# Patient Record
Sex: FEMALE | Race: OTHER | HISPANIC OR LATINO | URBAN - METROPOLITAN AREA
[De-identification: names, ages, dates, MRNs, and addresses within clinical notes are randomized per-mention and may not be internally consistent; named-entity substitution may affect disease eponyms.]

---

## 2018-12-18 ENCOUNTER — EMERGENCY (EMERGENCY)
Facility: HOSPITAL | Age: 53
LOS: 1 days | Discharge: HOME | End: 2018-12-18
Attending: EMERGENCY MEDICINE | Admitting: EMERGENCY MEDICINE
Payer: SELF-PAY

## 2018-12-18 VITALS
OXYGEN SATURATION: 98 % | DIASTOLIC BLOOD PRESSURE: 75 MMHG | RESPIRATION RATE: 16 BRPM | SYSTOLIC BLOOD PRESSURE: 133 MMHG | HEART RATE: 114 BPM | TEMPERATURE: 101 F

## 2018-12-18 VITALS
SYSTOLIC BLOOD PRESSURE: 111 MMHG | TEMPERATURE: 98 F | DIASTOLIC BLOOD PRESSURE: 77 MMHG | OXYGEN SATURATION: 100 % | HEART RATE: 90 BPM | RESPIRATION RATE: 18 BRPM

## 2018-12-18 DIAGNOSIS — Z79.2 LONG TERM (CURRENT) USE OF ANTIBIOTICS: ICD-10-CM

## 2018-12-18 DIAGNOSIS — Z98.890 OTHER SPECIFIED POSTPROCEDURAL STATES: Chronic | ICD-10-CM

## 2018-12-18 DIAGNOSIS — Z79.891 LONG TERM (CURRENT) USE OF OPIATE ANALGESIC: ICD-10-CM

## 2018-12-18 DIAGNOSIS — Z79.1 LONG TERM (CURRENT) USE OF NON-STEROIDAL ANTI-INFLAMMATORIES (NSAID): ICD-10-CM

## 2018-12-18 DIAGNOSIS — Z79.899 OTHER LONG TERM (CURRENT) DRUG THERAPY: ICD-10-CM

## 2018-12-18 DIAGNOSIS — R10.31 RIGHT LOWER QUADRANT PAIN: ICD-10-CM

## 2018-12-18 DIAGNOSIS — K57.32 DIVERTICULITIS OF LARGE INTESTINE WITHOUT PERFORATION OR ABSCESS WITHOUT BLEEDING: ICD-10-CM

## 2018-12-18 LAB
ALBUMIN SERPL ELPH-MCNC: 3.9 G/DL — SIGNIFICANT CHANGE UP (ref 3.4–5)
ALP SERPL-CCNC: 83 U/L — SIGNIFICANT CHANGE UP (ref 40–120)
ALT FLD-CCNC: 23 U/L — SIGNIFICANT CHANGE UP (ref 12–42)
ANION GAP SERPL CALC-SCNC: 7 MMOL/L — LOW (ref 9–16)
APPEARANCE UR: CLEAR — SIGNIFICANT CHANGE UP
APTT BLD: 27.2 SEC — LOW (ref 27.5–36.3)
AST SERPL-CCNC: 15 U/L — SIGNIFICANT CHANGE UP (ref 15–37)
BASOPHILS NFR BLD AUTO: 0.5 % — SIGNIFICANT CHANGE UP (ref 0–2)
BILIRUB SERPL-MCNC: 0.7 MG/DL — SIGNIFICANT CHANGE UP (ref 0.2–1.2)
BILIRUB UR-MCNC: NEGATIVE — SIGNIFICANT CHANGE UP
BUN SERPL-MCNC: 15 MG/DL — SIGNIFICANT CHANGE UP (ref 7–23)
CALCIUM SERPL-MCNC: 9.5 MG/DL — SIGNIFICANT CHANGE UP (ref 8.5–10.5)
CHLORIDE SERPL-SCNC: 98 MMOL/L — SIGNIFICANT CHANGE UP (ref 96–108)
CO2 SERPL-SCNC: 31 MMOL/L — SIGNIFICANT CHANGE UP (ref 22–31)
COLOR SPEC: YELLOW — SIGNIFICANT CHANGE UP
CREAT SERPL-MCNC: 0.84 MG/DL — SIGNIFICANT CHANGE UP (ref 0.5–1.3)
DIFF PNL FLD: ABNORMAL
EOSINOPHIL NFR BLD AUTO: 0 % — SIGNIFICANT CHANGE UP (ref 0–6)
GLUCOSE SERPL-MCNC: 236 MG/DL — HIGH (ref 70–99)
GLUCOSE UR QL: 500
HCG UR QL: NEGATIVE — SIGNIFICANT CHANGE UP
HCT VFR BLD CALC: 39.8 % — SIGNIFICANT CHANGE UP (ref 34.5–45)
HGB BLD-MCNC: 13.5 G/DL — SIGNIFICANT CHANGE UP (ref 11.5–15.5)
IMM GRANULOCYTES NFR BLD AUTO: 0.3 % — SIGNIFICANT CHANGE UP (ref 0–1.5)
INR BLD: 1.1 — SIGNIFICANT CHANGE UP (ref 0.88–1.16)
KETONES UR-MCNC: 15 MG/DL
LACTATE SERPL-SCNC: 1.8 MMOL/L — SIGNIFICANT CHANGE UP (ref 0.4–2)
LEUKOCYTE ESTERASE UR-ACNC: NEGATIVE — SIGNIFICANT CHANGE UP
LIDOCAIN IGE QN: 51 U/L — LOW (ref 73–393)
LYMPHOCYTES # BLD AUTO: 17.1 % — SIGNIFICANT CHANGE UP (ref 13–44)
MCHC RBC-ENTMCNC: 30.2 PG — SIGNIFICANT CHANGE UP (ref 27–34)
MCHC RBC-ENTMCNC: 33.9 G/DL — SIGNIFICANT CHANGE UP (ref 32–36)
MCV RBC AUTO: 89 FL — SIGNIFICANT CHANGE UP (ref 80–100)
MONOCYTES NFR BLD AUTO: 3.3 % — SIGNIFICANT CHANGE UP (ref 2–14)
NEUTROPHILS NFR BLD AUTO: 78.8 % — HIGH (ref 43–77)
NITRITE UR-MCNC: NEGATIVE — SIGNIFICANT CHANGE UP
PCP SPEC-MCNC: SIGNIFICANT CHANGE UP
PH UR: 6 — SIGNIFICANT CHANGE UP (ref 5–8)
PLATELET # BLD AUTO: 442 K/UL — HIGH (ref 150–400)
POTASSIUM SERPL-MCNC: 3.3 MMOL/L — LOW (ref 3.5–5.3)
POTASSIUM SERPL-SCNC: 3.3 MMOL/L — LOW (ref 3.5–5.3)
PROT SERPL-MCNC: 7.6 G/DL — SIGNIFICANT CHANGE UP (ref 6.4–8.2)
PROT UR-MCNC: NEGATIVE MG/DL — SIGNIFICANT CHANGE UP
PROTHROM AB SERPL-ACNC: 12.3 SEC — SIGNIFICANT CHANGE UP (ref 10–12.9)
RBC # BLD: 4.47 M/UL — SIGNIFICANT CHANGE UP (ref 3.8–5.2)
RBC # FLD: 11.8 % — SIGNIFICANT CHANGE UP (ref 10.3–14.5)
SODIUM SERPL-SCNC: 136 MMOL/L — SIGNIFICANT CHANGE UP (ref 132–145)
SP GR SPEC: 1.02 — SIGNIFICANT CHANGE UP (ref 1–1.03)
TROPONIN I SERPL-MCNC: <0.017 NG/ML — LOW (ref 0.02–0.06)
UROBILINOGEN FLD QL: 0.2 E.U./DL — SIGNIFICANT CHANGE UP
WBC # BLD: 4 K/UL — SIGNIFICANT CHANGE UP (ref 3.8–10.5)
WBC # FLD AUTO: 4 K/UL — SIGNIFICANT CHANGE UP (ref 3.8–10.5)

## 2018-12-18 PROCEDURE — 99218: CPT

## 2018-12-18 PROCEDURE — 74177 CT ABD & PELVIS W/CONTRAST: CPT | Mod: 26

## 2018-12-18 PROCEDURE — 93010 ELECTROCARDIOGRAM REPORT: CPT | Mod: 76

## 2018-12-18 RX ORDER — MORPHINE SULFATE 50 MG/1
4 CAPSULE, EXTENDED RELEASE ORAL ONCE
Qty: 0 | Refills: 0 | Status: DISCONTINUED | OUTPATIENT
Start: 2018-12-18 | End: 2018-12-18

## 2018-12-18 RX ORDER — FAMOTIDINE 10 MG/ML
20 INJECTION INTRAVENOUS ONCE
Qty: 0 | Refills: 0 | Status: COMPLETED | OUTPATIENT
Start: 2018-12-18 | End: 2018-12-18

## 2018-12-18 RX ORDER — POTASSIUM CHLORIDE 20 MEQ
40 PACKET (EA) ORAL ONCE
Qty: 0 | Refills: 0 | Status: COMPLETED | OUTPATIENT
Start: 2018-12-18 | End: 2018-12-18

## 2018-12-18 RX ORDER — METRONIDAZOLE 500 MG
500 TABLET ORAL ONCE
Qty: 0 | Refills: 0 | Status: COMPLETED | OUTPATIENT
Start: 2018-12-18 | End: 2018-12-18

## 2018-12-18 RX ORDER — SODIUM CHLORIDE 9 MG/ML
1000 INJECTION INTRAMUSCULAR; INTRAVENOUS; SUBCUTANEOUS ONCE
Qty: 0 | Refills: 0 | Status: COMPLETED | OUTPATIENT
Start: 2018-12-18 | End: 2018-12-18

## 2018-12-18 RX ORDER — METRONIDAZOLE 500 MG
1 TABLET ORAL
Qty: 21 | Refills: 0 | OUTPATIENT
Start: 2018-12-18 | End: 2018-12-24

## 2018-12-18 RX ORDER — ACETAMINOPHEN 500 MG
975 TABLET ORAL ONCE
Qty: 0 | Refills: 0 | Status: COMPLETED | OUTPATIENT
Start: 2018-12-18 | End: 2018-12-18

## 2018-12-18 RX ORDER — IBUPROFEN 200 MG
1 TABLET ORAL
Qty: 20 | Refills: 0 | OUTPATIENT
Start: 2018-12-18

## 2018-12-18 RX ORDER — ONDANSETRON 8 MG/1
4 TABLET, FILM COATED ORAL ONCE
Qty: 0 | Refills: 0 | Status: COMPLETED | OUTPATIENT
Start: 2018-12-18 | End: 2018-12-18

## 2018-12-18 RX ORDER — PIPERACILLIN AND TAZOBACTAM 4; .5 G/20ML; G/20ML
3.38 INJECTION, POWDER, LYOPHILIZED, FOR SOLUTION INTRAVENOUS ONCE
Qty: 0 | Refills: 0 | Status: COMPLETED | OUTPATIENT
Start: 2018-12-18 | End: 2018-12-18

## 2018-12-18 RX ORDER — KETOROLAC TROMETHAMINE 30 MG/ML
30 SYRINGE (ML) INJECTION ONCE
Qty: 0 | Refills: 0 | Status: DISCONTINUED | OUTPATIENT
Start: 2018-12-18 | End: 2018-12-18

## 2018-12-18 RX ORDER — ONDANSETRON 8 MG/1
1 TABLET, FILM COATED ORAL
Qty: 12 | Refills: 0 | OUTPATIENT
Start: 2018-12-18

## 2018-12-18 RX ORDER — CEFPODOXIME PROXETIL 100 MG
1 TABLET ORAL
Qty: 14 | Refills: 0 | OUTPATIENT
Start: 2018-12-18 | End: 2018-12-24

## 2018-12-18 RX ORDER — SODIUM CHLORIDE 9 MG/ML
1000 INJECTION, SOLUTION INTRAVENOUS
Qty: 0 | Refills: 0 | Status: DISCONTINUED | OUTPATIENT
Start: 2018-12-18 | End: 2018-12-22

## 2018-12-18 RX ADMIN — Medication 30 MILLIGRAM(S): at 08:53

## 2018-12-18 RX ADMIN — SODIUM CHLORIDE 2000 MILLILITER(S): 9 INJECTION INTRAMUSCULAR; INTRAVENOUS; SUBCUTANEOUS at 13:38

## 2018-12-18 RX ADMIN — FAMOTIDINE 20 MILLIGRAM(S): 10 INJECTION INTRAVENOUS at 08:53

## 2018-12-18 RX ADMIN — Medication 975 MILLIGRAM(S): at 13:00

## 2018-12-18 RX ADMIN — ONDANSETRON 4 MILLIGRAM(S): 8 TABLET, FILM COATED ORAL at 07:58

## 2018-12-18 RX ADMIN — PIPERACILLIN AND TAZOBACTAM 200 GRAM(S): 4; .5 INJECTION, POWDER, LYOPHILIZED, FOR SOLUTION INTRAVENOUS at 14:24

## 2018-12-18 RX ADMIN — Medication 100 MILLIGRAM(S): at 10:01

## 2018-12-18 RX ADMIN — Medication 1 MILLIGRAM(S): at 14:24

## 2018-12-18 RX ADMIN — SODIUM CHLORIDE 2000 MILLILITER(S): 9 INJECTION INTRAMUSCULAR; INTRAVENOUS; SUBCUTANEOUS at 14:12

## 2018-12-18 RX ADMIN — SODIUM CHLORIDE 2000 MILLILITER(S): 9 INJECTION INTRAMUSCULAR; INTRAVENOUS; SUBCUTANEOUS at 10:01

## 2018-12-18 RX ADMIN — Medication 30 MILLIGRAM(S): at 14:13

## 2018-12-18 RX ADMIN — SODIUM CHLORIDE 2000 MILLILITER(S): 9 INJECTION INTRAMUSCULAR; INTRAVENOUS; SUBCUTANEOUS at 08:53

## 2018-12-18 RX ADMIN — MORPHINE SULFATE 4 MILLIGRAM(S): 50 CAPSULE, EXTENDED RELEASE ORAL at 10:01

## 2018-12-18 RX ADMIN — Medication 40 MILLIEQUIVALENT(S): at 10:32

## 2018-12-18 RX ADMIN — SODIUM CHLORIDE 1000 MILLILITER(S): 9 INJECTION, SOLUTION INTRAVENOUS at 14:48

## 2018-12-18 NOTE — ED CDU PROVIDER DISPOSITION NOTE - CLINICAL COURSE
pt with lower abd pain, N/V, dry and dehydrated appearing, non toxic, labs with no leukocytosis but slight L shift, abd soft but diffusely tender in lower quadrants, CT with severe sigmoid diverticulitis without abscess or perforation, kept in obs for IVF and abx. Pt spiked temp of 104 subsequently, also noted amphetamine in urine, pt with lower abd pain, N/V, dry and dehydrated appearing, non toxic, labs with no leukocytosis but slight L shift, abd soft but diffusely tender in lower quadrants, CT with severe sigmoid diverticulitis without abscess or perforation, kept in obs for IVF and abx. covered with dose of levaquin and flagyl initially, Pt spiked temp of 104 subsequently, also noted amphetamine in urine, offered admission multiple times due to concerns for sepsis and high fever, pt and family at bedside, declined multiple times and desires to go home.  Agrees with continue IVF and antipyretic, added on zosyn for additional coverage, ice packs to the axilla and close monitoring. fever defervesced to 100.1 and pt tolerating po without N/V, abd soft, pt non toxic appearing and strongly desires to go home. Will dc home levaquin/flagyl/cefpodoxime, strict return precautions discussed, low fiber diet, prompt f/u with GI and PMD, return to ER for any worsening sx, pt and mother verbalized understanding

## 2018-12-18 NOTE — ED CDU PROVIDER DISPOSITION NOTE - CARE PROVIDERS DIRECT ADDRESSES
,DirectAddress_Unknown,[email or web address]

## 2018-12-18 NOTE — ED CDU PROVIDER INITIAL DAY NOTE - OBJECTIVE STATEMENT
54 yo F with no known PMHx, presenting c/o lower abdominal pain x 4d.  Pt reports having sudden onset of lower abdominal pain with associated slow urination and pain in the pelvic region, radiating to b/l lower extremities and lower back. Admits to chills, nausea and generalized weakness.  Denies fever,  hematuria, vaginal bleeding, d/c, change in bowel function, flank pain, rash, HA, dizziness, SOB, CP, palpitations, diaphoresis, cough, and malaise. No recent travel or sick contact noted

## 2018-12-18 NOTE — ED PROVIDER NOTE - OBJECTIVE STATEMENT
52 yo F with no known PMHx, 52 yo F with no known PMHx, presenting c/o lower abdominal pain x 4d.  Pt reports having sudden onset of lowerl abdominal pain with associated slow urination and pain in the pelvic region, radiating to b/l lower extremities and lower back. Admits to chills, nausea and generalized weakness.  Denies fever,  hematuria, vaginal bleeding, d/c, change in bowel function, flank pain, rash, HA, dizziness, SOB, CP, palpitations, diaphoresis, cough, and malaise. No recent travel or sick contact noted 52 yo F with no known PMHx, presenting c/o lower abdominal pain x 4d.  Pt reports having sudden onset of lower abdominal pain with associated slow urination and pain in the pelvic region, radiating to b/l lower extremities and lower back. Admits to chills, nausea and generalized weakness.  Denies fever,  hematuria, vaginal bleeding, d/c, change in bowel function, flank pain, rash, HA, dizziness, SOB, CP, palpitations, diaphoresis, cough, and malaise. No recent travel or sick contact noted

## 2018-12-18 NOTE — ED CDU PROVIDER DISPOSITION NOTE - CARE PROVIDER_API CALL
Susan Renner), Gastroenterology  [address] [facility]  [address]  [place] [address]  Phone: [phone]  Fax: [phone]    Fannie Hamlin), Internal Medicine  [address] [place] [address]  Phone: [phone]  Fax: [phone]

## 2018-12-18 NOTE — ED ADULT TRIAGE NOTE - CHIEF COMPLAINT QUOTE
ambulatory, complaining of lower abdominal pain with nausea radiating to the back since yesterday. Denies urinary sx.

## 2018-12-18 NOTE — ED CDU PROVIDER INITIAL DAY NOTE - MEDICAL DECISION MAKING DETAILS
pt with lower abd pain, N/V, dry and dehydrated appearing, non toxic, labs with normal wbc but slightly L shift, abd soft but diffusely tender in lower quadrants, CT with severe sigmoid diverticulitis without abscess or perforation, will keep in obs for IV abx, pain control, po trial and close monitoring

## 2018-12-18 NOTE — ED CDU PROVIDER DISPOSITION NOTE - ATTENDING CONTRIBUTION TO CARE
Pt improved while on observation.  Strict return precautions discussed.  Strict instructions for close f/u with PCP/clinic.  Medications discussed.

## 2018-12-18 NOTE — ED PROVIDER NOTE - MEDICAL DECISION MAKING DETAILS
pt with lower abd pain, N/V, dry and dehydrated appearing, non toxic, labs with mildly elevated wbc, abd soft but diffusely tender in lower quadrants, CT with severe sigmoid diverticulitis without abscess or perforation, will keep in obs for IV abx, pain control, po trial and close monitoring

## 2018-12-18 NOTE — ED ADULT NURSE NOTE - NSIMPLEMENTINTERV_GEN_ALL_ED
Implemented All Universal Safety Interventions:  Lissie to call system. Call bell, personal items and telephone within reach. Instruct patient to call for assistance. Room bathroom lighting operational. Non-slip footwear when patient is off stretcher. Physically safe environment: no spills, clutter or unnecessary equipment. Stretcher in lowest position, wheels locked, appropriate side rails in place.

## 2018-12-18 NOTE — ED CDU PROVIDER INITIAL DAY NOTE - ATTENDING CONTRIBUTION TO CARE
Pt on observation for sigmoid diverticulitis.  IV abx, pain control, IV hydration, and serial abd exams.

## 2018-12-18 NOTE — ED ADULT NURSE NOTE - OBJECTIVE STATEMENT
umbilical pain radiating to groin x 4 days, denies additional symptoms, aunt at bedside, no s/s of distress, will continue to monitor

## 2018-12-18 NOTE — SBIRT NOTE. - NSSBIRTSERVICES_GEN_A_ED_FT
Provided SBIRT services : Full screen positive. Positive reinforcement provided given patient currently within healthy guidelines. Educational materials reviewed and given to patient .     Audit Score : 3    Dast Score :0    Duration : 5 Minutes

## 2018-12-18 NOTE — ED PROVIDER NOTE - PHYSICAL EXAMINATION
Vital Signs - nursing notes reviewed and confirmed  Gen - WDWN F, NAD, comfortable and non-toxic appearing, speaking in full sentences   Skin - warm, dry, intact  HEENT - AT/NC, PERRL, EOMI, no conjunctival injection, dry oral mucosa, TM intact b/l with good cone of lights, o/p clear with no erythema, edema, or exudate, uvula midline, airway patent, neck supple and NT, FROM, no JVD or carotid bruits b/l, no palpable nodes  CV - S1S2, R/R/R  Resp - respiration non-labored, CTAB, symmetric bs b/l, no r/r/w  GI - NABS, soft, ND, b/l lower quadrants TTP with guarding, no rebound, no CVAT b/l   MS - w/w/p, no c/c/e, calves supple and NT, distal pulses symmetric b/l, brisk cap refills, +SILT  Neuro - AxOx3, no focal neuro deficits, ambulatory without gait disturbance

## 2018-12-18 NOTE — ED PROVIDER NOTE - ATTENDING CONTRIBUTION TO CARE
Pt is a 54yo F with no PMH.  P/w lower abd pain.   PE - agree with Above.   A/P - CT demonstrates sigmoid diverticulitis.  Pt declines admission will place on obs for abx, IV hydration, and pain control.

## 2018-12-18 NOTE — ED CDU PROVIDER INITIAL DAY NOTE - PROGRESS NOTE DETAILS
pt with lower abd pain, N/V, dry and dehydrated appearing, non toxic, labs with no leukocytosis but slight L shift, abd soft but diffusely tender in lower quadrants, CT with severe sigmoid diverticulitis without abscess or perforation, will keep in obs for IV abx, pain control, IV hydration and po trial repeat EKG with no dynamic changes, persistent slight STD in interolateral leads, pt CP free, abd pain adequately controlled with dose of IV morphine, will continue with IVF, abx, and current management spiked temp of 102, BP stable, pain adequately controlled, pt non toxic appearing, will give APAP and IVF, continue to monitor offered admission multiple times due to concerns for sepsis and high fever, pt and family at bedside, declined multiple times and desires to go home.  Agrees with continue IVF and antipyretic, will add on zosyn for additional coverage, ice packs to the axilla and close monitoring

## 2019-01-28 ENCOUNTER — INPATIENT (INPATIENT)
Facility: HOSPITAL | Age: 54
LOS: 7 days | Discharge: HOME | DRG: 391 | End: 2019-02-05
Attending: GENERAL ACUTE CARE HOSPITAL | Admitting: GENERAL ACUTE CARE HOSPITAL
Payer: COMMERCIAL

## 2019-01-28 VITALS
RESPIRATION RATE: 18 BRPM | DIASTOLIC BLOOD PRESSURE: 89 MMHG | WEIGHT: 115.08 LBS | TEMPERATURE: 98 F | OXYGEN SATURATION: 96 % | SYSTOLIC BLOOD PRESSURE: 118 MMHG | HEART RATE: 80 BPM

## 2019-01-28 DIAGNOSIS — Z98.890 OTHER SPECIFIED POSTPROCEDURAL STATES: Chronic | ICD-10-CM

## 2019-01-28 LAB
ALBUMIN SERPL ELPH-MCNC: 1.9 G/DL — LOW (ref 3.4–5)
ALP SERPL-CCNC: 96 U/L — SIGNIFICANT CHANGE UP (ref 40–120)
ALT FLD-CCNC: 20 U/L — SIGNIFICANT CHANGE UP (ref 12–42)
ANION GAP SERPL CALC-SCNC: 10 MMOL/L — SIGNIFICANT CHANGE UP (ref 9–16)
APPEARANCE UR: SIGNIFICANT CHANGE UP
AST SERPL-CCNC: 16 U/L — SIGNIFICANT CHANGE UP (ref 15–37)
BASOPHILS NFR BLD AUTO: 0.4 % — SIGNIFICANT CHANGE UP (ref 0–2)
BILIRUB SERPL-MCNC: 0.2 MG/DL — SIGNIFICANT CHANGE UP (ref 0.2–1.2)
BILIRUB UR-MCNC: NEGATIVE — SIGNIFICANT CHANGE UP
BUN SERPL-MCNC: 10 MG/DL — SIGNIFICANT CHANGE UP (ref 7–23)
CALCIUM SERPL-MCNC: 8.8 MG/DL — SIGNIFICANT CHANGE UP (ref 8.5–10.5)
CHLORIDE SERPL-SCNC: 101 MMOL/L — SIGNIFICANT CHANGE UP (ref 96–108)
CO2 SERPL-SCNC: 27 MMOL/L — SIGNIFICANT CHANGE UP (ref 22–31)
COLOR SPEC: YELLOW — SIGNIFICANT CHANGE UP
CREAT SERPL-MCNC: 0.59 MG/DL — SIGNIFICANT CHANGE UP (ref 0.5–1.3)
DIFF PNL FLD: NEGATIVE — SIGNIFICANT CHANGE UP
EOSINOPHIL NFR BLD AUTO: 0.2 % — SIGNIFICANT CHANGE UP (ref 0–6)
GLUCOSE SERPL-MCNC: 112 MG/DL — HIGH (ref 70–99)
GLUCOSE UR QL: NEGATIVE — SIGNIFICANT CHANGE UP
HCT VFR BLD CALC: 31.5 % — LOW (ref 34.5–45)
HGB BLD-MCNC: 10.1 G/DL — LOW (ref 11.5–15.5)
IMM GRANULOCYTES NFR BLD AUTO: 0.9 % — SIGNIFICANT CHANGE UP (ref 0–1.5)
KETONES UR-MCNC: NEGATIVE — SIGNIFICANT CHANGE UP
LACTATE SERPL-SCNC: 1.2 MMOL/L — SIGNIFICANT CHANGE UP (ref 0.4–2)
LEUKOCYTE ESTERASE UR-ACNC: NEGATIVE — SIGNIFICANT CHANGE UP
LIDOCAIN IGE QN: 68 U/L — LOW (ref 73–393)
LYMPHOCYTES # BLD AUTO: 14.4 % — SIGNIFICANT CHANGE UP (ref 13–44)
MCHC RBC-ENTMCNC: 27.7 PG — SIGNIFICANT CHANGE UP (ref 27–34)
MCHC RBC-ENTMCNC: 32.1 G/DL — SIGNIFICANT CHANGE UP (ref 32–36)
MCV RBC AUTO: 86.3 FL — SIGNIFICANT CHANGE UP (ref 80–100)
MONOCYTES NFR BLD AUTO: 8.1 % — SIGNIFICANT CHANGE UP (ref 2–14)
NEUTROPHILS NFR BLD AUTO: 76 % — SIGNIFICANT CHANGE UP (ref 43–77)
NITRITE UR-MCNC: NEGATIVE — SIGNIFICANT CHANGE UP
PH UR: 6.5 — SIGNIFICANT CHANGE UP (ref 5–8)
PLATELET # BLD AUTO: 659 K/UL — HIGH (ref 150–400)
POTASSIUM SERPL-MCNC: 3.7 MMOL/L — SIGNIFICANT CHANGE UP (ref 3.5–5.3)
POTASSIUM SERPL-SCNC: 3.7 MMOL/L — SIGNIFICANT CHANGE UP (ref 3.5–5.3)
PROT SERPL-MCNC: 6.5 G/DL — SIGNIFICANT CHANGE UP (ref 6.4–8.2)
PROT UR-MCNC: 30 MG/DL
RBC # BLD: 3.65 M/UL — LOW (ref 3.8–5.2)
RBC # FLD: 13.2 % — SIGNIFICANT CHANGE UP (ref 10.3–14.5)
SODIUM SERPL-SCNC: 138 MMOL/L — SIGNIFICANT CHANGE UP (ref 132–145)
SP GR SPEC: 1.02 — SIGNIFICANT CHANGE UP (ref 1–1.03)
UROBILINOGEN FLD QL: 0.2 E.U./DL — SIGNIFICANT CHANGE UP
WBC # BLD: 16.1 K/UL — HIGH (ref 3.8–10.5)
WBC # FLD AUTO: 16.1 K/UL — HIGH (ref 3.8–10.5)

## 2019-01-28 PROCEDURE — 74177 CT ABD & PELVIS W/CONTRAST: CPT | Mod: 26

## 2019-01-28 PROCEDURE — 99285 EMERGENCY DEPT VISIT HI MDM: CPT

## 2019-01-28 PROCEDURE — 99222 1ST HOSP IP/OBS MODERATE 55: CPT

## 2019-01-28 RX ORDER — SODIUM CHLORIDE 9 MG/ML
1000 INJECTION, SOLUTION INTRAVENOUS
Qty: 0 | Refills: 0 | Status: DISCONTINUED | OUTPATIENT
Start: 2019-01-28 | End: 2019-02-03

## 2019-01-28 RX ORDER — SODIUM CHLORIDE 9 MG/ML
1000 INJECTION INTRAMUSCULAR; INTRAVENOUS; SUBCUTANEOUS ONCE
Qty: 0 | Refills: 0 | Status: COMPLETED | OUTPATIENT
Start: 2019-01-28 | End: 2019-01-28

## 2019-01-28 RX ORDER — SODIUM CHLORIDE 9 MG/ML
3 INJECTION INTRAMUSCULAR; INTRAVENOUS; SUBCUTANEOUS ONCE
Qty: 0 | Refills: 0 | Status: COMPLETED | OUTPATIENT
Start: 2019-01-28 | End: 2019-01-28

## 2019-01-28 RX ORDER — ONDANSETRON 8 MG/1
4 TABLET, FILM COATED ORAL EVERY 6 HOURS
Qty: 0 | Refills: 0 | Status: DISCONTINUED | OUTPATIENT
Start: 2019-01-28 | End: 2019-02-05

## 2019-01-28 RX ORDER — PIPERACILLIN AND TAZOBACTAM 4; .5 G/20ML; G/20ML
3.38 INJECTION, POWDER, LYOPHILIZED, FOR SOLUTION INTRAVENOUS ONCE
Qty: 0 | Refills: 0 | Status: COMPLETED | OUTPATIENT
Start: 2019-01-28 | End: 2019-01-28

## 2019-01-28 RX ORDER — MORPHINE SULFATE 50 MG/1
4 CAPSULE, EXTENDED RELEASE ORAL ONCE
Qty: 0 | Refills: 0 | Status: DISCONTINUED | OUTPATIENT
Start: 2019-01-28 | End: 2019-01-28

## 2019-01-28 RX ORDER — ACETAMINOPHEN 500 MG
1000 TABLET ORAL ONCE
Qty: 0 | Refills: 0 | Status: COMPLETED | OUTPATIENT
Start: 2019-01-28 | End: 2019-01-28

## 2019-01-28 RX ORDER — HYDROMORPHONE HYDROCHLORIDE 2 MG/ML
0.5 INJECTION INTRAMUSCULAR; INTRAVENOUS; SUBCUTANEOUS ONCE
Qty: 0 | Refills: 0 | Status: DISCONTINUED | OUTPATIENT
Start: 2019-01-28 | End: 2019-01-28

## 2019-01-28 RX ORDER — PIPERACILLIN AND TAZOBACTAM 4; .5 G/20ML; G/20ML
3.38 INJECTION, POWDER, LYOPHILIZED, FOR SOLUTION INTRAVENOUS EVERY 6 HOURS
Qty: 0 | Refills: 0 | Status: DISCONTINUED | OUTPATIENT
Start: 2019-01-29 | End: 2019-02-02

## 2019-01-28 RX ORDER — HYDROMORPHONE HYDROCHLORIDE 2 MG/ML
0.5 INJECTION INTRAMUSCULAR; INTRAVENOUS; SUBCUTANEOUS EVERY 4 HOURS
Qty: 0 | Refills: 0 | Status: DISCONTINUED | OUTPATIENT
Start: 2019-01-28 | End: 2019-02-03

## 2019-01-28 RX ORDER — HEPARIN SODIUM 5000 [USP'U]/ML
5000 INJECTION INTRAVENOUS; SUBCUTANEOUS EVERY 12 HOURS
Qty: 0 | Refills: 0 | Status: DISCONTINUED | OUTPATIENT
Start: 2019-01-28 | End: 2019-02-05

## 2019-01-28 RX ADMIN — SODIUM CHLORIDE 1000 MILLILITER(S): 9 INJECTION INTRAMUSCULAR; INTRAVENOUS; SUBCUTANEOUS at 13:14

## 2019-01-28 RX ADMIN — Medication 1000 MILLIGRAM(S): at 22:40

## 2019-01-28 RX ADMIN — SODIUM CHLORIDE 1000 MILLILITER(S): 9 INJECTION INTRAMUSCULAR; INTRAVENOUS; SUBCUTANEOUS at 18:00

## 2019-01-28 RX ADMIN — SODIUM CHLORIDE 90 MILLILITER(S): 9 INJECTION, SOLUTION INTRAVENOUS at 20:56

## 2019-01-28 RX ADMIN — SODIUM CHLORIDE 1000 MILLILITER(S): 9 INJECTION INTRAMUSCULAR; INTRAVENOUS; SUBCUTANEOUS at 17:05

## 2019-01-28 RX ADMIN — PIPERACILLIN AND TAZOBACTAM 3.38 GRAM(S): 4; .5 INJECTION, POWDER, LYOPHILIZED, FOR SOLUTION INTRAVENOUS at 17:36

## 2019-01-28 RX ADMIN — Medication 400 MILLIGRAM(S): at 21:52

## 2019-01-28 RX ADMIN — SODIUM CHLORIDE 3 MILLILITER(S): 9 INJECTION INTRAMUSCULAR; INTRAVENOUS; SUBCUTANEOUS at 13:10

## 2019-01-28 RX ADMIN — PIPERACILLIN AND TAZOBACTAM 200 GRAM(S): 4; .5 INJECTION, POWDER, LYOPHILIZED, FOR SOLUTION INTRAVENOUS at 17:05

## 2019-01-28 RX ADMIN — HYDROMORPHONE HYDROCHLORIDE 0.5 MILLIGRAM(S): 2 INJECTION INTRAMUSCULAR; INTRAVENOUS; SUBCUTANEOUS at 14:03

## 2019-01-28 NOTE — H&P ADULT - NSHPLABSRESULTS_GEN_ALL_CORE
Vital Signs Last 24 Hrs  T(C): 36.8 (28 Jan 2019 21:56), Max: 37.6 (28 Jan 2019 20:44)  T(F): 98.3 (28 Jan 2019 21:56), Max: 99.6 (28 Jan 2019 20:44)  HR: 112 (28 Jan 2019 21:56) (80 - 123)  BP: 111/63 (28 Jan 2019 21:56) (106/59 - 120/61)  BP(mean): 63 (28 Jan 2019 19:19) (63 - 63)  RR: 18 (28 Jan 2019 21:56) (16 - 18)  SpO2: 96% (28 Jan 2019 21:56) (96% - 98%)  I&O's Summary                            10.1   16.1  )-----------( 659      ( 28 Jan 2019 13:00 )             31.5   01-28    138  |  101  |  10  ----------------------------<  112<H>  3.7   |  27  |  0.59    Ca    8.8      28 Jan 2019 13:00    TPro  6.5  /  Alb  1.9<L>  /  TBili  0.2  /  DBili  x   /  AST  16  /  ALT  20  /  AlkPhos  96  01-28  LIVER FUNCTIONS - ( 28 Jan 2019 13:00 )  Alb: 1.9 g/dL / Pro: 6.5 g/dL / ALK PHOS: 96 U/L / ALT: 20 U/L / AST: 16 U/L / GGT: x             < from: CT Abdomen and Pelvis w/ IV Cont (01.28.19 @ 14:36) >    INTERPRETATION:  CT SCAN OF ABDOMEN AND PELVIS    History: Lower abdominal pain, history of diverticulitis.    Technique: CT scan of abdomen and pelvis was performed from lung bases   through symphysis pubis following the administration of intravenous   contrast. 96 mL of Omnipaque 350 were used and 4 mL were discarded.   Axial, sagittal and coronal reformatted images were reviewed.    Comparison: CT abdomen and pelvis 12/18/2018.    Findings:     Lower chest: Unremarkable.     Liver:  Again demonstrated are numerous subcentimeter hypodensities   scattered throughout the liver that represent hepatic hamartomas versus   hepatic cysts.    Gallbladder: No radiopaque stones. Unremarkable.    Spleen:  Unremarkable.    Pancreas:  Unremarkable.    Adrenal glands:  Unremarkable.    Kidneys: Punctate nonobstructing stone seen in lower pole of left kidney.   Several bilateral subcentimeter hypodensitiesseen and are too small to   characterize.    Bowel: Normal appendix. No small bowel obstruction. Since 12/18/2018,   there been interval development of two intra-abdominal abscesses. The   largest one measures 14 x 3.7 x 4.3 cm in the largest dimension and   extends from the mid pelvis to the right lower quadrant below the cecum.   A second abscess measures 2.3 x 3.4 x 3.4 cm and is located in the right   mid abdomen below the hepatic flexure of the colon. There is a separate   2.5 cm interloop abscess more medially in the mid abdomen to the right of   the midline. There is a 1.4 cm small abscess in the midline lower pelvis   anteriorly. There is a 1.3 cm abscess in the left lower quadrant   anteriorly. There is a 2 cm outpouching of sigmoid colon consistent with   known diverticulitis. Bowel wall thickening is seen within the sigmoid   colon as well as scattered segments of small bowel. Scattered colonic   diverticulosis however there is no definitive CT evidence of   diverticulitis.     Adenopathy:  No lymphadenopathy in abdomen or pelvis.    Vessels: Unremarkable.    Bladder: Unremarkable.    Pelvic reproductive organs: Unremarkable.    Bones/Soft tissue: Mild levoscoliosis of the lumbar spine.    Impression: Findings consistent with perforated diverticulitis of the   sigmoid colon. Since previous CT abdomen and pelvis 12/18/2018, there is   been interval development of multiple abscesses (at least 5 in number)   measuring up to 14 cm in the abdomen and pelvis.

## 2019-01-28 NOTE — H&P ADULT - ASSESSMENT
54 yo F w/ no PMHx and PSHx of sinus surgery, presenting from [facility] w/ perforated diverticulitis.    - Admit to Telemetry, Surgery Team 4 - Dr. Man  - NPO/SILVANO  - Zosyn  - SCDs, HSubQ, IS, OOBA  - IR for percutaneous drainage   - AM labs

## 2019-01-28 NOTE — ED PROVIDER NOTE - OBJECTIVE STATEMENT
54 y/o F w/ hx of diverticulitis, sent by  to ED w/ c/o constant lower abd pain. Pt states that she was in ED on December 20th and dx w/ diverticulitis. Pt finished course of abx but does not feel fully recovered. States she is doing better than initial visit but continues to have lower abd pain w/ associated diarrhea, 2x/day. Reports losing 10-15lbs, that though she can eat, she has a "fear of eating". Denies n/v, uri sx, bloody stool, visual changes, ha, cp, sob, fever, chills, sick contacts. Pt admits to feeling anxious.
Report given to OR.

## 2019-01-28 NOTE — ED PROVIDER NOTE - PROGRESS NOTE DETAILS
Pt refuses to take IV contrast. CT results of diverticulitis with abscess and perf discussed with pt.  Blood cultures, lactate, IVFS and Zosyn ordered.  Pt amenable to admission.  Pt discussed with Dr. Man, surg at [facility].  Repeat VS remarkable for elevated heart rate of 123.  Pt accepted to step down/tele. Pt refuses to take PO contrast.

## 2019-01-28 NOTE — ED PROVIDER NOTE - CHPI ED SYMPTOMS NEG
no vomiting/no nausea/no blood in stool/no fever/no uri sx, no visual changes, no ha, no cp, no sob/no chills

## 2019-01-28 NOTE — CHART NOTE - NSCHARTNOTEFT_GEN_A_CORE
Procedure: CT guided abscess drainage     Indication: Perforated diverticulitis, multiple intra-abdominal abscesses    Clinical History: Perforated diverticulitis    Meds:heparin  Injectable 5000 Unit(s) SubCutaneous every 12 hours  HYDROmorphone  Injectable 0.5 milliGRAM(s) IV Push every 4 hours PRN  lactated ringers. 1000 milliLiter(s) IV Continuous <Continuous>  ondansetron Injectable 4 milliGRAM(s) IV Push every 6 hours PRN    Allergies: No Known Allergies        Labs:                         10.1   16.1  )-----------( 659      ( 28 Jan 2019 13:00 )             31.5       01-28    138  |  101  |  10  ----------------------------<  112<H>  3.7   |  27  |  0.59    Ca    8.8      28 Jan 2019 13:00    TPro  6.5  /  Alb  1.9<L>  /  TBili  0.2  /  DBili  x   /  AST  16  /  ALT  20  /  AlkPhos  96  01-28        Physical Exam: Tachycardic to 115, BP wnl, afebrile, lower abdomen tender to palpation, soft, slightly distended    Anesthesia: Moderate sedation    ASA: 2    NPO: NPO since yesterday

## 2019-01-28 NOTE — H&P ADULT - NSHPREVIEWOFSYSTEMS_GEN_ALL_CORE
Currently denies fever/chills, chest pain, shortness of breath, nausea, vomiting, hematemesis, bloody/tarry stools, urinary pain or frequency, leg swelling.

## 2019-01-28 NOTE — ED ADULT NURSE NOTE - OBJECTIVE STATEMENT
pt reports abdominal pain x 1 week with diarrhea 3 x day.  pt diagnosed last month diverticulits.   pt reports not eating and no appetite.

## 2019-01-28 NOTE — H&P ADULT - HISTORY OF PRESENT ILLNESS
Ms. Driscoll is a 52 yo F w/ no PMHx and PSHx of sinus surgery, presenting from [facility] w/ perforated diverticulitis. Pt reports her symptoms began in the middle of December as generalized abdominal pain associated with diarrhea, no nauesa/vomiting, fevers, chills. Pt presented to [facility] on 12/18/18 and CT showed severe diverticulitis. Pt declined hospital admission at the time and was treated with outpatient oral abx. Pt reports persistent abdominal pain and diarrhea since this first presentation on 12/18. Pt denies any worsening symptoms over this time period, fever, nausea, vomiting, chills. Pt re-presented to [facility] today due to her persistent symptoms. Pt has never had similar episodes in the past. Pt denies hx of C scope.     [facility] Afebrie, tachy to 108, WBC 16.1  CT: Perforated diverticulitis of sigmoid colon w/ multiple abscesses (around 5) measuring up to 14cm in abdomen and pelvis.

## 2019-01-28 NOTE — ED PROVIDER NOTE - CONDUCTED A DETAILED DISCUSSION WITH PATIENT AND/OR GUARDIAN REGARDING, MDM
return to ED if symptoms worsen, persist or questions arise/need for outpatient follow-up/lab results lab results/need for outpatient follow-up/radiology results/return to ED if symptoms worsen, persist or questions arise

## 2019-01-28 NOTE — ED PROVIDER NOTE - MEDICAL DECISION MAKING DETAILS
52 y/o F presents to ED c/o persistent lower abd pain since last ED visit on 12/18/18 when pt was diagnosed with severe sigmoid diverticulitis.  Pt well appearing, VSS, afebrile.  Abd soft, non-tender.  WBC elevated 16.  UA negative.  CT abd/pelvis 52 y/o F presents to ED c/o persistent lower abd pain since last ED visit on 12/18/18 when pt was diagnosed with severe sigmoid diverticulitis.  Pt well appearing, VSS, afebrile.  Abd soft, non-tender.  WBC elevated 16.  UA negative.  CT abd/pelvis shows diverticulitis with multiple abscesses.  Will admit to [facility].

## 2019-01-28 NOTE — ED PROVIDER NOTE - NS_EDPROVIDERDISPOUSERTYPE_ED_A_ED
I have personally evaluated and examined the patient. The Attending was available to me as a supervising provider if needed. Scribe Attestation (For Scribes USE Only).../I have personally evaluated and examined the patient. The Attending was available to me as a supervising provider if needed. Scribe Attestation (For Scribes USE Only)... Scribe Attestation (For Scribes USE Only).../Attending Attestation (For Attendings USE Only)...

## 2019-01-28 NOTE — ED PROVIDER NOTE - ATTENDING CONTRIBUTION TO CARE
Pt is a 52yo F who was diagnosed with diverticulitis last month.  Pt was treated with PO abx and an observation stay here.  Completed all abx and sxs improved but did nopt go away 100%.  +Lower abd pain and diarrhea.  Sxs worsened with PO intake.  Now afraid to eat and reports 10-15 lbs weight loss.  ROS - No n/v, uri sx, bloody stool, visual changes, ha, cp, sob, fever, chills, sick contacts.  PE - agree with NP exam as above.   Labs - +Leukocytosis. Lactate pending.   CTAP - Multiple pericolonic abscess' likely due to perforated diverticula.   A/P - Will admit to surgery tele bed for treatment of colonic abscess.'  Discussed with pt and her family need for IV abx and possible drainage via IR vs surgery.  Will give weight based fluid and blood cultures sent prior to abx.

## 2019-01-28 NOTE — ED ADULT TRIAGE NOTE - CHIEF COMPLAINT QUOTE
here for abdominal pain- was seen here recently and was told by PCP to have another CT of her abdomen done

## 2019-01-28 NOTE — ED PROVIDER NOTE - CHIEF COMPLAINT
The patient is a 53y Female complaining of The patient is a 53y Female complaining of lower abd pain.

## 2019-01-28 NOTE — H&P ADULT - NSHPPHYSICALEXAM_GEN_ALL_CORE
Gen: NAD, resting comfortably in bed  Pulm: CTAB, no respiratory distress, nonlabored breathing  C/V: RRR  Abd: Soft, mildly distended, mild TTP mid R abdomen and RLQ. No surgical scars. No tympany. No guarding.  Extrem: WWP, no edema, nontender

## 2019-01-29 LAB
ANION GAP SERPL CALC-SCNC: 10 MMOL/L — SIGNIFICANT CHANGE UP (ref 5–17)
APTT BLD: 26.5 SEC — LOW (ref 27.5–36.3)
BUN SERPL-MCNC: 7 MG/DL — SIGNIFICANT CHANGE UP (ref 7–23)
CALCIUM SERPL-MCNC: 8.6 MG/DL — SIGNIFICANT CHANGE UP (ref 8.4–10.5)
CHLORIDE SERPL-SCNC: 101 MMOL/L — SIGNIFICANT CHANGE UP (ref 96–108)
CO2 SERPL-SCNC: 27 MMOL/L — SIGNIFICANT CHANGE UP (ref 22–31)
CREAT SERPL-MCNC: 0.58 MG/DL — SIGNIFICANT CHANGE UP (ref 0.5–1.3)
GLUCOSE SERPL-MCNC: 118 MG/DL — HIGH (ref 70–99)
GRAM STN FLD: SIGNIFICANT CHANGE UP
HCT VFR BLD CALC: 29.8 % — LOW (ref 34.5–45)
HGB BLD-MCNC: 9.3 G/DL — LOW (ref 11.5–15.5)
INR BLD: 1.26 — HIGH (ref 0.88–1.16)
MAGNESIUM SERPL-MCNC: 1.9 MG/DL — SIGNIFICANT CHANGE UP (ref 1.6–2.6)
MCHC RBC-ENTMCNC: 26.9 PG — LOW (ref 27–34)
MCHC RBC-ENTMCNC: 31.2 G/DL — LOW (ref 32–36)
MCV RBC AUTO: 86.1 FL — SIGNIFICANT CHANGE UP (ref 80–100)
PHOSPHATE SERPL-MCNC: 4.4 MG/DL — SIGNIFICANT CHANGE UP (ref 2.5–4.5)
PLATELET # BLD AUTO: 575 K/UL — HIGH (ref 150–400)
POTASSIUM SERPL-MCNC: 3.9 MMOL/L — SIGNIFICANT CHANGE UP (ref 3.5–5.3)
POTASSIUM SERPL-SCNC: 3.9 MMOL/L — SIGNIFICANT CHANGE UP (ref 3.5–5.3)
PROTHROM AB SERPL-ACNC: 14.3 SEC — HIGH (ref 10–12.9)
RBC # BLD: 3.46 M/UL — LOW (ref 3.8–5.2)
RBC # FLD: 13.5 % — SIGNIFICANT CHANGE UP (ref 10.3–16.9)
SODIUM SERPL-SCNC: 138 MMOL/L — SIGNIFICANT CHANGE UP (ref 135–145)
SPECIMEN SOURCE: SIGNIFICANT CHANGE UP
WBC # BLD: 10 K/UL — SIGNIFICANT CHANGE UP (ref 3.8–10.5)
WBC # FLD AUTO: 10 K/UL — SIGNIFICANT CHANGE UP (ref 3.8–10.5)

## 2019-01-29 PROCEDURE — 99233 SBSQ HOSP IP/OBS HIGH 50: CPT

## 2019-01-29 PROCEDURE — 49406 IMAGE CATH FLUID PERI/RETRO: CPT

## 2019-01-29 RX ORDER — POTASSIUM CHLORIDE 20 MEQ
10 PACKET (EA) ORAL
Qty: 0 | Refills: 0 | Status: COMPLETED | OUTPATIENT
Start: 2019-01-29 | End: 2019-01-29

## 2019-01-29 RX ORDER — MAGNESIUM SULFATE 500 MG/ML
1 VIAL (ML) INJECTION ONCE
Qty: 0 | Refills: 0 | Status: COMPLETED | OUTPATIENT
Start: 2019-01-29 | End: 2019-01-29

## 2019-01-29 RX ADMIN — PIPERACILLIN AND TAZOBACTAM 200 GRAM(S): 4; .5 INJECTION, POWDER, LYOPHILIZED, FOR SOLUTION INTRAVENOUS at 00:54

## 2019-01-29 RX ADMIN — HYDROMORPHONE HYDROCHLORIDE 0.5 MILLIGRAM(S): 2 INJECTION INTRAMUSCULAR; INTRAVENOUS; SUBCUTANEOUS at 23:00

## 2019-01-29 RX ADMIN — HYDROMORPHONE HYDROCHLORIDE 0.5 MILLIGRAM(S): 2 INJECTION INTRAMUSCULAR; INTRAVENOUS; SUBCUTANEOUS at 22:31

## 2019-01-29 RX ADMIN — HYDROMORPHONE HYDROCHLORIDE 0.5 MILLIGRAM(S): 2 INJECTION INTRAMUSCULAR; INTRAVENOUS; SUBCUTANEOUS at 17:34

## 2019-01-29 RX ADMIN — PIPERACILLIN AND TAZOBACTAM 200 GRAM(S): 4; .5 INJECTION, POWDER, LYOPHILIZED, FOR SOLUTION INTRAVENOUS at 12:42

## 2019-01-29 RX ADMIN — HYDROMORPHONE HYDROCHLORIDE 0.5 MILLIGRAM(S): 2 INJECTION INTRAMUSCULAR; INTRAVENOUS; SUBCUTANEOUS at 13:40

## 2019-01-29 RX ADMIN — SODIUM CHLORIDE 90 MILLILITER(S): 9 INJECTION, SOLUTION INTRAVENOUS at 08:46

## 2019-01-29 RX ADMIN — Medication 100 GRAM(S): at 08:46

## 2019-01-29 RX ADMIN — HYDROMORPHONE HYDROCHLORIDE 0.5 MILLIGRAM(S): 2 INJECTION INTRAMUSCULAR; INTRAVENOUS; SUBCUTANEOUS at 02:16

## 2019-01-29 RX ADMIN — HYDROMORPHONE HYDROCHLORIDE 0.5 MILLIGRAM(S): 2 INJECTION INTRAMUSCULAR; INTRAVENOUS; SUBCUTANEOUS at 08:50

## 2019-01-29 RX ADMIN — PIPERACILLIN AND TAZOBACTAM 200 GRAM(S): 4; .5 INJECTION, POWDER, LYOPHILIZED, FOR SOLUTION INTRAVENOUS at 17:34

## 2019-01-29 RX ADMIN — Medication 100 MILLIEQUIVALENT(S): at 08:45

## 2019-01-29 RX ADMIN — HYDROMORPHONE HYDROCHLORIDE 0.5 MILLIGRAM(S): 2 INJECTION INTRAMUSCULAR; INTRAVENOUS; SUBCUTANEOUS at 18:32

## 2019-01-29 RX ADMIN — HYDROMORPHONE HYDROCHLORIDE 0.5 MILLIGRAM(S): 2 INJECTION INTRAMUSCULAR; INTRAVENOUS; SUBCUTANEOUS at 07:53

## 2019-01-29 RX ADMIN — HEPARIN SODIUM 5000 UNIT(S): 5000 INJECTION INTRAVENOUS; SUBCUTANEOUS at 06:31

## 2019-01-29 RX ADMIN — HYDROMORPHONE HYDROCHLORIDE 0.5 MILLIGRAM(S): 2 INJECTION INTRAMUSCULAR; INTRAVENOUS; SUBCUTANEOUS at 12:41

## 2019-01-29 RX ADMIN — PIPERACILLIN AND TAZOBACTAM 200 GRAM(S): 4; .5 INJECTION, POWDER, LYOPHILIZED, FOR SOLUTION INTRAVENOUS at 06:31

## 2019-01-29 RX ADMIN — HYDROMORPHONE HYDROCHLORIDE 0.5 MILLIGRAM(S): 2 INJECTION INTRAMUSCULAR; INTRAVENOUS; SUBCUTANEOUS at 02:50

## 2019-01-29 RX ADMIN — HEPARIN SODIUM 5000 UNIT(S): 5000 INJECTION INTRAVENOUS; SUBCUTANEOUS at 17:35

## 2019-01-29 NOTE — PROGRESS NOTE ADULT - SUBJECTIVE AND OBJECTIVE BOX
24 hr events:  O/N: Admitted with perforated diverticulitis, IR for drainage of R mid quadrant abscess (30cc purulent fluid) and mid pelvic collection (40cc of fecal/purulent fluid), JEROME, AVSS, EMILIE X2 RUQ EMILIE 10, RLQ EMILIE 12    SUBJECTIVE:  Reports that pain is better. Denies fevers/chills, CP/SOB.    Vital Signs Last 24 Hrs  T(C): 36.5 (2019 04:12), Max: 37.6 (2019 20:44)  T(F): 97.7 (2019 04:12), Max: 99.6 (2019 20:44)  HR: 104 (2019 04:12) (80 - 123)  BP: 112/67 (2019 04:12) (106/59 - 120/61)  BP(mean): 63 (2019 19:19) (63 - 63)  RR: 16 (2019 04:12) (16 - 18)  SpO2: 99% (2019 04:12) (96% - 100%)    Physical Exam:  General: NAD  Pulmonary: Nonlabored breathing, no respiratory distress  Abdominal: soft, NT/ND; EMILIE x2, both with feculent output; lower EMILIE more grossly feculent  Neuro: A/O x3    I&O's Summary  2019 07:01  -  2019 07:00  --------------------------------------------------------  IN: 0 mL / OUT: 422 mL / NET: -422 mL    LABS:                     10.1   16.1  )-----------( 659      ( 2019 13:00 )             31.5       138  |  101  |  10  ----------------------------<  112<H>  3.7   |  27  |  0.59    Ca    8.8      2019 13:00    TPro  6.5  /  Alb  1.9<L>  /  TBili  0.2  /  DBili  x   /  AST  16  /  ALT  20  /  AlkPhos  96      Urinalysis Basic - ( 2019 13:15 )  Color: Yellow / Appearance: SL CLOUDY / S.025 / pH: x  Gluc: x / Ketone: NEGATIVE  / Bili: NEGATIVE / Urobili: 0.2 E.U./dL   Blood: x / Protein: 30 mg/dL / Nitrite: NEGATIVE   Leuk Esterase: NEGATIVE / RBC: < 5 /HPF / WBC < 5 /HPF   Sq Epi: x / Non Sq Epi: x / Bacteria: x    LIVER FUNCTIONS - ( 2019 13:00 )  Alb: 1.9 g/dL / Pro: 6.5 g/dL / ALK PHOS: 96 U/L / ALT: 20 U/L / AST: 16 U/L / GGT: x

## 2019-01-29 NOTE — PROGRESS NOTE ADULT - SUBJECTIVE AND OBJECTIVE BOX
52 yo woman s/p CT guided drains placed for perforated diverticulitis, intra-abdominal abscesses on 1/28/19, seen during rounds this AM.      NASEEM drain flushes easily with 5ml NS --> serosang, slightly purulent -- > reconnected to EMILIE on SS  RLA drain flushes easily with 5ml NS --> purulent stool 9foul smelling --> reconnected to EMILIE on SS    WBC:  10.0 (today)  1/28 - 16.1    Tmax 100.0    Output:   NASEEM - 20 ml  RLQ - 27 ml

## 2019-01-30 LAB
ANION GAP SERPL CALC-SCNC: 9 MMOL/L — SIGNIFICANT CHANGE UP (ref 5–17)
BUN SERPL-MCNC: 6 MG/DL — LOW (ref 7–23)
CALCIUM SERPL-MCNC: 8.6 MG/DL — SIGNIFICANT CHANGE UP (ref 8.4–10.5)
CHLORIDE SERPL-SCNC: 100 MMOL/L — SIGNIFICANT CHANGE UP (ref 96–108)
CO2 SERPL-SCNC: 28 MMOL/L — SIGNIFICANT CHANGE UP (ref 22–31)
CREAT SERPL-MCNC: 0.66 MG/DL — SIGNIFICANT CHANGE UP (ref 0.5–1.3)
GLUCOSE SERPL-MCNC: 102 MG/DL — HIGH (ref 70–99)
GRAM STN FLD: SIGNIFICANT CHANGE UP
HCT VFR BLD CALC: 30.2 % — LOW (ref 34.5–45)
HGB BLD-MCNC: 9.5 G/DL — LOW (ref 11.5–15.5)
MAGNESIUM SERPL-MCNC: 2.3 MG/DL — SIGNIFICANT CHANGE UP (ref 1.6–2.6)
MCHC RBC-ENTMCNC: 27.1 PG — SIGNIFICANT CHANGE UP (ref 27–34)
MCHC RBC-ENTMCNC: 31.5 G/DL — LOW (ref 32–36)
MCV RBC AUTO: 86 FL — SIGNIFICANT CHANGE UP (ref 80–100)
PHOSPHATE SERPL-MCNC: 4.3 MG/DL — SIGNIFICANT CHANGE UP (ref 2.5–4.5)
PLATELET # BLD AUTO: 627 K/UL — HIGH (ref 150–400)
POTASSIUM SERPL-MCNC: 4.3 MMOL/L — SIGNIFICANT CHANGE UP (ref 3.5–5.3)
POTASSIUM SERPL-SCNC: 4.3 MMOL/L — SIGNIFICANT CHANGE UP (ref 3.5–5.3)
RBC # BLD: 3.51 M/UL — LOW (ref 3.8–5.2)
RBC # FLD: 13.6 % — SIGNIFICANT CHANGE UP (ref 10.3–16.9)
SODIUM SERPL-SCNC: 137 MMOL/L — SIGNIFICANT CHANGE UP (ref 135–145)
SPECIMEN SOURCE: SIGNIFICANT CHANGE UP
WBC # BLD: 9.6 K/UL — SIGNIFICANT CHANGE UP (ref 3.8–10.5)
WBC # FLD AUTO: 9.6 K/UL — SIGNIFICANT CHANGE UP (ref 3.8–10.5)

## 2019-01-30 PROCEDURE — 99233 SBSQ HOSP IP/OBS HIGH 50: CPT

## 2019-01-30 RX ADMIN — HYDROMORPHONE HYDROCHLORIDE 0.5 MILLIGRAM(S): 2 INJECTION INTRAMUSCULAR; INTRAVENOUS; SUBCUTANEOUS at 17:37

## 2019-01-30 RX ADMIN — SODIUM CHLORIDE 90 MILLILITER(S): 9 INJECTION, SOLUTION INTRAVENOUS at 19:48

## 2019-01-30 RX ADMIN — HYDROMORPHONE HYDROCHLORIDE 0.5 MILLIGRAM(S): 2 INJECTION INTRAMUSCULAR; INTRAVENOUS; SUBCUTANEOUS at 13:31

## 2019-01-30 RX ADMIN — PIPERACILLIN AND TAZOBACTAM 200 GRAM(S): 4; .5 INJECTION, POWDER, LYOPHILIZED, FOR SOLUTION INTRAVENOUS at 13:30

## 2019-01-30 RX ADMIN — HYDROMORPHONE HYDROCHLORIDE 0.5 MILLIGRAM(S): 2 INJECTION INTRAMUSCULAR; INTRAVENOUS; SUBCUTANEOUS at 07:04

## 2019-01-30 RX ADMIN — HYDROMORPHONE HYDROCHLORIDE 0.5 MILLIGRAM(S): 2 INJECTION INTRAMUSCULAR; INTRAVENOUS; SUBCUTANEOUS at 07:30

## 2019-01-30 RX ADMIN — PIPERACILLIN AND TAZOBACTAM 200 GRAM(S): 4; .5 INJECTION, POWDER, LYOPHILIZED, FOR SOLUTION INTRAVENOUS at 01:02

## 2019-01-30 RX ADMIN — HYDROMORPHONE HYDROCHLORIDE 0.5 MILLIGRAM(S): 2 INJECTION INTRAMUSCULAR; INTRAVENOUS; SUBCUTANEOUS at 03:01

## 2019-01-30 RX ADMIN — HEPARIN SODIUM 5000 UNIT(S): 5000 INJECTION INTRAVENOUS; SUBCUTANEOUS at 17:37

## 2019-01-30 RX ADMIN — HEPARIN SODIUM 5000 UNIT(S): 5000 INJECTION INTRAVENOUS; SUBCUTANEOUS at 05:32

## 2019-01-30 RX ADMIN — HYDROMORPHONE HYDROCHLORIDE 0.5 MILLIGRAM(S): 2 INJECTION INTRAMUSCULAR; INTRAVENOUS; SUBCUTANEOUS at 03:35

## 2019-01-30 RX ADMIN — PIPERACILLIN AND TAZOBACTAM 200 GRAM(S): 4; .5 INJECTION, POWDER, LYOPHILIZED, FOR SOLUTION INTRAVENOUS at 06:58

## 2019-01-30 RX ADMIN — PIPERACILLIN AND TAZOBACTAM 200 GRAM(S): 4; .5 INJECTION, POWDER, LYOPHILIZED, FOR SOLUTION INTRAVENOUS at 19:48

## 2019-01-30 RX ADMIN — HYDROMORPHONE HYDROCHLORIDE 0.5 MILLIGRAM(S): 2 INJECTION INTRAMUSCULAR; INTRAVENOUS; SUBCUTANEOUS at 22:00

## 2019-01-30 RX ADMIN — HYDROMORPHONE HYDROCHLORIDE 0.5 MILLIGRAM(S): 2 INJECTION INTRAMUSCULAR; INTRAVENOUS; SUBCUTANEOUS at 18:00

## 2019-01-30 RX ADMIN — HYDROMORPHONE HYDROCHLORIDE 0.5 MILLIGRAM(S): 2 INJECTION INTRAMUSCULAR; INTRAVENOUS; SUBCUTANEOUS at 13:59

## 2019-01-30 RX ADMIN — HYDROMORPHONE HYDROCHLORIDE 0.5 MILLIGRAM(S): 2 INJECTION INTRAMUSCULAR; INTRAVENOUS; SUBCUTANEOUS at 21:34

## 2019-01-30 NOTE — PROGRESS NOTE ADULT - SUBJECTIVE AND OBJECTIVE BOX
52 yo woman s/p CT guided drains placed for perforated diverticulitis, intra-abdominal abscesses on 1/28/19.  24 hour drainage: Right lower quadrant: 27, right upper quadrant 18.  Flushed both drains easily with 5 cc of saline. No resistance.  Changed the right lower quadrant bulb and distal tubing with a new bulb given the presence of heavy viscous material, impeding passage through the tube as discussed with the primary team.  Please contact IR if there are any further questions or concerns.

## 2019-01-30 NOTE — PROGRESS NOTE ADULT - SUBJECTIVE AND OBJECTIVE BOX
SUBJECTIVE: Patient feeling well, passing flatus. Patient seen and examined bedside by chief resident.    heparin  Injectable 5000 Unit(s) SubCutaneous every 12 hours  piperacillin/tazobactam IVPB. 3.375 Gram(s) IV Intermittent every 6 hours      Vital Signs Last 24 Hrs  T(C): 36.5 (2019 05:34), Max: 37.8 (2019 14:06)  T(F): 97.7 (2019 05:34), Max: 100 (2019 14:06)  HR: 16 (2019 05:34) (16 - 120)  BP: 118/65 (2019 05:34) (107/58 - 132/65)  BP(mean): 94 (2019 05:00) (80 - 94)  RR: 17 (2019 05:34) (15 - 18)  SpO2: 97% (2019 05:34) (95% - 100%)  I&O's Detail    2019 07:01  -  2019 07:00  --------------------------------------------------------  IN:    IV PiggyBack: 200 mL    lactated ringers.: 1800 mL  Total IN: 2000 mL    OUT:    Bulb: 27 mL    Bulb: 18 mL    Voided: 1000 mL  Total OUT: 1045 mL    Total NET: 955 mL          General: NAD, resting comfortably in bed  C/V: NSR  Pulm: Nonlabored breathing, no respiratory distress  Abd: soft, NT/ND, RLQ EMILIE feculent, RUQ SS/feculent  Extrem: WWP, no edema        LABS:                        9.5    9.6   )-----------( 627      ( 2019 06:23 )             30.2     01-30    137  |  100  |  6<L>  ----------------------------<  102<H>  4.3   |  28  |  0.66    Ca    8.6      2019 06:23  Phos  4.3     -30  Mg     2.3         TPro  6.5  /  Alb  1.9<L>  /  TBili  0.2  /  DBili  x   /  AST  16  /  ALT  20  /  AlkPhos  96  -    PT/INR - ( 2019 07:08 )   PT: 14.3 sec;   INR: 1.26          PTT - ( 2019 07:08 )  PTT:26.5 sec  Urinalysis Basic - ( 2019 13:15 )    Color: Yellow / Appearance: SL CLOUDY / S.025 / pH: x  Gluc: x / Ketone: NEGATIVE  / Bili: NEGATIVE / Urobili: 0.2 E.U./dL   Blood: x / Protein: 30 mg/dL / Nitrite: NEGATIVE   Leuk Esterase: NEGATIVE / RBC: < 5 /HPF / WBC < 5 /HPF   Sq Epi: x / Non Sq Epi: x / Bacteria: x

## 2019-01-31 LAB
-  AMIKACIN: SIGNIFICANT CHANGE UP
-  AMPICILLIN/SULBACTAM: SIGNIFICANT CHANGE UP
-  AMPICILLIN: SIGNIFICANT CHANGE UP
-  AZTREONAM: SIGNIFICANT CHANGE UP
-  CEFAZOLIN: SIGNIFICANT CHANGE UP
-  CEFAZOLIN: SIGNIFICANT CHANGE UP
-  CEFEPIME: SIGNIFICANT CHANGE UP
-  CEFOTAXIME: SIGNIFICANT CHANGE UP
-  CEFOXITIN: SIGNIFICANT CHANGE UP
-  CEFTAZIDIME: SIGNIFICANT CHANGE UP
-  CEFTRIAXONE: SIGNIFICANT CHANGE UP
-  CEFUROXIME: SIGNIFICANT CHANGE UP
-  CIPROFLOXACIN: SIGNIFICANT CHANGE UP
-  ERTAPENEM: SIGNIFICANT CHANGE UP
-  GENTAMICIN: SIGNIFICANT CHANGE UP
-  LEVOFLOXACIN: SIGNIFICANT CHANGE UP
-  MEROPENEM: SIGNIFICANT CHANGE UP
-  MOXIFLOXACIN(AEROBIC): SIGNIFICANT CHANGE UP
-  PIPERACILLIN/TAZOBACTAM: SIGNIFICANT CHANGE UP
-  TETRACYCLINE: SIGNIFICANT CHANGE UP
-  TIGECYCLINE: SIGNIFICANT CHANGE UP
-  TOBRAMYCIN: SIGNIFICANT CHANGE UP
-  TRIMETHOPRIM/SULFAMETHOXAZOLE: SIGNIFICANT CHANGE UP
ANION GAP SERPL CALC-SCNC: 10 MMOL/L — SIGNIFICANT CHANGE UP (ref 5–17)
BUN SERPL-MCNC: 4 MG/DL — LOW (ref 7–23)
CALCIUM SERPL-MCNC: 8.8 MG/DL — SIGNIFICANT CHANGE UP (ref 8.4–10.5)
CHLORIDE SERPL-SCNC: 99 MMOL/L — SIGNIFICANT CHANGE UP (ref 96–108)
CO2 SERPL-SCNC: 29 MMOL/L — SIGNIFICANT CHANGE UP (ref 22–31)
CREAT SERPL-MCNC: 0.66 MG/DL — SIGNIFICANT CHANGE UP (ref 0.5–1.3)
CULTURE RESULTS: SIGNIFICANT CHANGE UP
GLUCOSE SERPL-MCNC: 88 MG/DL — SIGNIFICANT CHANGE UP (ref 70–99)
HCT VFR BLD CALC: 31.3 % — LOW (ref 34.5–45)
HGB BLD-MCNC: 9.7 G/DL — LOW (ref 11.5–15.5)
MAGNESIUM SERPL-MCNC: 2.2 MG/DL — SIGNIFICANT CHANGE UP (ref 1.6–2.6)
MCHC RBC-ENTMCNC: 27.2 PG — SIGNIFICANT CHANGE UP (ref 27–34)
MCHC RBC-ENTMCNC: 31 G/DL — LOW (ref 32–36)
MCV RBC AUTO: 87.9 FL — SIGNIFICANT CHANGE UP (ref 80–100)
METHOD TYPE: SIGNIFICANT CHANGE UP
ORGANISM # SPEC MICROSCOPIC CNT: SIGNIFICANT CHANGE UP
ORGANISM # SPEC MICROSCOPIC CNT: SIGNIFICANT CHANGE UP
PHOSPHATE SERPL-MCNC: 3.8 MG/DL — SIGNIFICANT CHANGE UP (ref 2.5–4.5)
PLATELET # BLD AUTO: 690 K/UL — HIGH (ref 150–400)
POTASSIUM SERPL-MCNC: 4.2 MMOL/L — SIGNIFICANT CHANGE UP (ref 3.5–5.3)
POTASSIUM SERPL-SCNC: 4.2 MMOL/L — SIGNIFICANT CHANGE UP (ref 3.5–5.3)
RBC # BLD: 3.56 M/UL — LOW (ref 3.8–5.2)
RBC # FLD: 13.4 % — SIGNIFICANT CHANGE UP (ref 10.3–16.9)
SODIUM SERPL-SCNC: 138 MMOL/L — SIGNIFICANT CHANGE UP (ref 135–145)
SPECIMEN SOURCE: SIGNIFICANT CHANGE UP
WBC # BLD: 10.7 K/UL — HIGH (ref 3.8–10.5)
WBC # FLD AUTO: 10.7 K/UL — HIGH (ref 3.8–10.5)

## 2019-01-31 PROCEDURE — 74177 CT ABD & PELVIS W/CONTRAST: CPT | Mod: 26

## 2019-01-31 PROCEDURE — 99233 SBSQ HOSP IP/OBS HIGH 50: CPT

## 2019-01-31 RX ORDER — ACETAMINOPHEN 500 MG
1000 TABLET ORAL ONCE
Qty: 0 | Refills: 0 | Status: COMPLETED | OUTPATIENT
Start: 2019-01-31 | End: 2019-01-31

## 2019-01-31 RX ORDER — IOHEXOL 300 MG/ML
30 INJECTION, SOLUTION INTRAVENOUS ONCE
Qty: 0 | Refills: 0 | Status: COMPLETED | OUTPATIENT
Start: 2019-01-31 | End: 2019-01-31

## 2019-01-31 RX ADMIN — PIPERACILLIN AND TAZOBACTAM 200 GRAM(S): 4; .5 INJECTION, POWDER, LYOPHILIZED, FOR SOLUTION INTRAVENOUS at 18:52

## 2019-01-31 RX ADMIN — PIPERACILLIN AND TAZOBACTAM 200 GRAM(S): 4; .5 INJECTION, POWDER, LYOPHILIZED, FOR SOLUTION INTRAVENOUS at 01:14

## 2019-01-31 RX ADMIN — IOHEXOL 30 MILLILITER(S): 300 INJECTION, SOLUTION INTRAVENOUS at 10:39

## 2019-01-31 RX ADMIN — HYDROMORPHONE HYDROCHLORIDE 0.5 MILLIGRAM(S): 2 INJECTION INTRAMUSCULAR; INTRAVENOUS; SUBCUTANEOUS at 10:07

## 2019-01-31 RX ADMIN — PIPERACILLIN AND TAZOBACTAM 200 GRAM(S): 4; .5 INJECTION, POWDER, LYOPHILIZED, FOR SOLUTION INTRAVENOUS at 13:26

## 2019-01-31 RX ADMIN — HYDROMORPHONE HYDROCHLORIDE 0.5 MILLIGRAM(S): 2 INJECTION INTRAMUSCULAR; INTRAVENOUS; SUBCUTANEOUS at 15:34

## 2019-01-31 RX ADMIN — HYDROMORPHONE HYDROCHLORIDE 0.5 MILLIGRAM(S): 2 INJECTION INTRAMUSCULAR; INTRAVENOUS; SUBCUTANEOUS at 05:39

## 2019-01-31 RX ADMIN — PIPERACILLIN AND TAZOBACTAM 200 GRAM(S): 4; .5 INJECTION, POWDER, LYOPHILIZED, FOR SOLUTION INTRAVENOUS at 07:27

## 2019-01-31 RX ADMIN — HEPARIN SODIUM 5000 UNIT(S): 5000 INJECTION INTRAVENOUS; SUBCUTANEOUS at 05:39

## 2019-01-31 RX ADMIN — HYDROMORPHONE HYDROCHLORIDE 0.5 MILLIGRAM(S): 2 INJECTION INTRAMUSCULAR; INTRAVENOUS; SUBCUTANEOUS at 19:53

## 2019-01-31 RX ADMIN — HEPARIN SODIUM 5000 UNIT(S): 5000 INJECTION INTRAVENOUS; SUBCUTANEOUS at 18:52

## 2019-01-31 RX ADMIN — HYDROMORPHONE HYDROCHLORIDE 0.5 MILLIGRAM(S): 2 INJECTION INTRAMUSCULAR; INTRAVENOUS; SUBCUTANEOUS at 20:30

## 2019-01-31 RX ADMIN — SODIUM CHLORIDE 90 MILLILITER(S): 9 INJECTION, SOLUTION INTRAVENOUS at 22:26

## 2019-01-31 RX ADMIN — HYDROMORPHONE HYDROCHLORIDE 0.5 MILLIGRAM(S): 2 INJECTION INTRAMUSCULAR; INTRAVENOUS; SUBCUTANEOUS at 15:53

## 2019-01-31 RX ADMIN — HYDROMORPHONE HYDROCHLORIDE 0.5 MILLIGRAM(S): 2 INJECTION INTRAMUSCULAR; INTRAVENOUS; SUBCUTANEOUS at 06:05

## 2019-01-31 RX ADMIN — Medication 1000 MILLIGRAM(S): at 15:00

## 2019-01-31 RX ADMIN — HYDROMORPHONE HYDROCHLORIDE 0.5 MILLIGRAM(S): 2 INJECTION INTRAMUSCULAR; INTRAVENOUS; SUBCUTANEOUS at 10:25

## 2019-01-31 RX ADMIN — Medication 400 MILLIGRAM(S): at 14:20

## 2019-01-31 NOTE — DIETITIAN INITIAL EVALUATION ADULT. - ENERGY NEEDS
Ht (1/31 per pt): 65in, Wt (1/31 per pt): 56.8kg, IBW: 125# +/-10%, %IBW: 100%, BMI: 20.8   ABW used to calculate energy needs due to pt's current body weight within % IBW. Needs adjusted for medical status, wt loss PTA.

## 2019-01-31 NOTE — DIETITIAN INITIAL EVALUATION ADULT. - NS AS NUTRI INTERV MEALS SNACK
Recommend advance to CLD-> full liquid diet -> low fiber diet as medically appropriate, monitor need for supplements

## 2019-01-31 NOTE — PROGRESS NOTE ADULT - SUBJECTIVE AND OBJECTIVE BOX
24 hr events:  O/N: JEROME, afebrile, tachy to 112, JPX2 RUQ 2.5/25 RLQ 5/40  1/30: RUQ E coli, RLQ E coli & klebsiella, IR flushed drains    SUBJECTIVE:  Pt seen and examined at bedside by chief resident. Pt doing well. (+) flatus      Vital Signs Last 24 Hrs  T(C): 38.1 (31 Jan 2019 13:12), Max: 38.1 (31 Jan 2019 13:12)  T(F): 100.5 (31 Jan 2019 13:12), Max: 100.5 (31 Jan 2019 13:12)  HR: 124 (31 Jan 2019 13:12) (107 - 124)  BP: 124/75 (31 Jan 2019 13:12) (113/61 - 125/70)  RR: 15 (31 Jan 2019 13:12) (15 - 17)  SpO2: 94% (31 Jan 2019 13:12) (94% - 97%)    Physical Exam:  General: NAD  Pulmonary: Nonlabored breathing, no respiratory distress  Cardiovascular: Tachy  Abdominal: soft, NT/ND, RUQ & RLQ EMILIE feculent   Extremities: Warm, well perfused  Neuro: A/O x3, no focal deficits    Lines/drains/tubes:  RUQ EMILIE  RLQ EMILIE    I&O's Summary    30 Jan 2019 07:01  -  31 Jan 2019 07:00  --------------------------------------------------------  IN: 2290 mL / OUT: 1770.5 mL / NET: 519.5 mL    31 Jan 2019 07:01  -  31 Jan 2019 13:47  --------------------------------------------------------  IN: 1340 mL / OUT: 640 mL / NET: 700 mL        LABS:                        9.7    10.7  )-----------( 690      ( 31 Jan 2019 06:31 )             31.3     01-31    138  |  99  |  4<L>  ----------------------------<  88  4.2   |  29  |  0.66    Ca    8.8      31 Jan 2019 06:31  Phos  3.8     01-31  Mg     2.2     01-31

## 2019-01-31 NOTE — PROGRESS NOTE ADULT - SUBJECTIVE AND OBJECTIVE BOX
54 yo woman s/p CT guided drains placed for perforated diverticulitis, intra-abdominal abscesses on 1/28/19.  24 hour drainage: Right lower quadrant: 43, right upper quadrant 28.  Flushed both drains easily with 4 cc of saline. No resistance.    Please contact IR if there are any further questions or concerns.

## 2019-01-31 NOTE — DIETITIAN INITIAL EVALUATION ADULT. - SIGNS/SYMPTOMS
AEB suspected decreased appetite and 7.4% wt loss x1 month AEB suspected decreased appetite and 7.4% wt loss x2 months

## 2019-01-31 NOTE — DIETITIAN INITIAL EVALUATION ADULT. - OTHER INFO
52 yo F w/ no PMHx and PSHx of sinus surgery, presenting from [facility] w/ perforated diverticulitis w/ feculent peritonitis s/p IR drainage x2. Pt NPO, reports fair appetite PTA and endorses 10# unintentional wt loss in the last month, reports following balanced/healthy diet PTA. GI: +flatus, +BM today loose per pt, denies N/V. Pt denies pain. Skin: surgical incision abdomen. Recommend advance to CLD -> full liquid diet -> low fiber diet as medically appropriate. Educated pt on low fiber diet 2/2 diverticulitis and provided handouts. Pt receptive to diet education. Encouraged pt to consume low fiber protein shakes when home if unable to consume sufficient energy and/or pt with further weight loss. Will monitor and f/u per nutrition dept protocol. 54 yo F w/ no PMHx and PSHx of sinus surgery, presenting from [facility] w/ perforated diverticulitis w/ feculent peritonitis s/p IR drainage x2. Pt NPO, reports fair appetite PTA and endorses 10# unintentional wt loss in the last 2 months, reports following balanced/healthy diet PTA. GI: +flatus, +BM today loose per pt, denies N/V. Pt denies pain. Skin: surgical incision abdomen. Recommend advance to CLD -> full liquid diet -> low fiber diet as medically appropriate. Educated pt on low fiber diet 2/2 diverticulitis and provided handouts. Pt receptive to diet education. Encouraged pt to consume low fiber protein shakes when home if unable to consume sufficient energy and/or pt with further weight loss. Will monitor and f/u per nutrition dept protocol.

## 2019-01-31 NOTE — CHART NOTE - NSCHARTNOTEFT_GEN_A_CORE
Upon Nutritional Assessment by the Registered Dietitian your patient was determined to meet criteria / has evidence of the following diagnosis/diagnoses:          [ ]  Mild Protein Calorie Malnutrition        [X ]  Moderate Protein Calorie Malnutrition        [ ] Severe Protein Calorie Malnutrition        [ ] Unspecified Protein Calorie Malnutrition        [ ] Underweight / BMI <19        [ ] Morbid Obesity / BMI > 40    Suspected <75% EER >/=1 month  ~7.4% wt loss x 2 months     No apparent muscle wasting or fat loss noted    Findings as based on:  •  Comprehensive nutrition assessment and consultation    Pt NPO 2/2 perforated diverticulitis with feculent peritonitis. Pt endorses significant weight loss over the past 2 months. Recommend advance to CLD -> full liquid diet -> low fiber diet as medically appropriate. Educated pt on low fiber diet 2/2 diverticulitis and provided handouts. Pt receptive to diet education. Encouraged pt to consume low fiber protein shakes when home if unable to consume sufficient energy and/or pt with further weight loss. Will monitor and f/u per nutrition dept protocol. D/W PA.    PROVIDER Section:     By signing this assessment you are acknowledging and agree with the diagnosis/diagnoses assigned by the Registered Dietitian    Comments:

## 2019-02-01 LAB
ANION GAP SERPL CALC-SCNC: 10 MMOL/L — SIGNIFICANT CHANGE UP (ref 5–17)
BUN SERPL-MCNC: 4 MG/DL — LOW (ref 7–23)
CALCIUM SERPL-MCNC: 8.4 MG/DL — SIGNIFICANT CHANGE UP (ref 8.4–10.5)
CHLORIDE SERPL-SCNC: 100 MMOL/L — SIGNIFICANT CHANGE UP (ref 96–108)
CO2 SERPL-SCNC: 28 MMOL/L — SIGNIFICANT CHANGE UP (ref 22–31)
CREAT SERPL-MCNC: 0.56 MG/DL — SIGNIFICANT CHANGE UP (ref 0.5–1.3)
GLUCOSE SERPL-MCNC: 82 MG/DL — SIGNIFICANT CHANGE UP (ref 70–99)
HCT VFR BLD CALC: 29.2 % — LOW (ref 34.5–45)
HGB BLD-MCNC: 9 G/DL — LOW (ref 11.5–15.5)
MAGNESIUM SERPL-MCNC: 2 MG/DL — SIGNIFICANT CHANGE UP (ref 1.6–2.6)
MCHC RBC-ENTMCNC: 26.5 PG — LOW (ref 27–34)
MCHC RBC-ENTMCNC: 30.8 G/DL — LOW (ref 32–36)
MCV RBC AUTO: 86.1 FL — SIGNIFICANT CHANGE UP (ref 80–100)
PHOSPHATE SERPL-MCNC: 3.8 MG/DL — SIGNIFICANT CHANGE UP (ref 2.5–4.5)
PLATELET # BLD AUTO: 584 K/UL — HIGH (ref 150–400)
POTASSIUM SERPL-MCNC: 3.2 MMOL/L — LOW (ref 3.5–5.3)
POTASSIUM SERPL-SCNC: 3.2 MMOL/L — LOW (ref 3.5–5.3)
RBC # BLD: 3.39 M/UL — LOW (ref 3.8–5.2)
RBC # FLD: 13.7 % — SIGNIFICANT CHANGE UP (ref 10.3–16.9)
SODIUM SERPL-SCNC: 138 MMOL/L — SIGNIFICANT CHANGE UP (ref 135–145)
WBC # BLD: 8.1 K/UL — SIGNIFICANT CHANGE UP (ref 3.8–10.5)
WBC # FLD AUTO: 8.1 K/UL — SIGNIFICANT CHANGE UP (ref 3.8–10.5)

## 2019-02-01 PROCEDURE — 99233 SBSQ HOSP IP/OBS HIGH 50: CPT

## 2019-02-01 RX ORDER — POTASSIUM CHLORIDE 20 MEQ
10 PACKET (EA) ORAL
Qty: 0 | Refills: 0 | Status: COMPLETED | OUTPATIENT
Start: 2019-02-01 | End: 2019-02-01

## 2019-02-01 RX ADMIN — PIPERACILLIN AND TAZOBACTAM 200 GRAM(S): 4; .5 INJECTION, POWDER, LYOPHILIZED, FOR SOLUTION INTRAVENOUS at 18:06

## 2019-02-01 RX ADMIN — HYDROMORPHONE HYDROCHLORIDE 0.5 MILLIGRAM(S): 2 INJECTION INTRAMUSCULAR; INTRAVENOUS; SUBCUTANEOUS at 18:06

## 2019-02-01 RX ADMIN — HYDROMORPHONE HYDROCHLORIDE 0.5 MILLIGRAM(S): 2 INJECTION INTRAMUSCULAR; INTRAVENOUS; SUBCUTANEOUS at 22:12

## 2019-02-01 RX ADMIN — HEPARIN SODIUM 5000 UNIT(S): 5000 INJECTION INTRAVENOUS; SUBCUTANEOUS at 05:25

## 2019-02-01 RX ADMIN — SODIUM CHLORIDE 90 MILLILITER(S): 9 INJECTION, SOLUTION INTRAVENOUS at 11:06

## 2019-02-01 RX ADMIN — PIPERACILLIN AND TAZOBACTAM 200 GRAM(S): 4; .5 INJECTION, POWDER, LYOPHILIZED, FOR SOLUTION INTRAVENOUS at 12:52

## 2019-02-01 RX ADMIN — Medication 100 MILLIEQUIVALENT(S): at 13:04

## 2019-02-01 RX ADMIN — PIPERACILLIN AND TAZOBACTAM 200 GRAM(S): 4; .5 INJECTION, POWDER, LYOPHILIZED, FOR SOLUTION INTRAVENOUS at 00:48

## 2019-02-01 RX ADMIN — HYDROMORPHONE HYDROCHLORIDE 0.5 MILLIGRAM(S): 2 INJECTION INTRAMUSCULAR; INTRAVENOUS; SUBCUTANEOUS at 23:00

## 2019-02-01 RX ADMIN — HYDROMORPHONE HYDROCHLORIDE 0.5 MILLIGRAM(S): 2 INJECTION INTRAMUSCULAR; INTRAVENOUS; SUBCUTANEOUS at 01:15

## 2019-02-01 RX ADMIN — HYDROMORPHONE HYDROCHLORIDE 0.5 MILLIGRAM(S): 2 INJECTION INTRAMUSCULAR; INTRAVENOUS; SUBCUTANEOUS at 10:00

## 2019-02-01 RX ADMIN — HYDROMORPHONE HYDROCHLORIDE 0.5 MILLIGRAM(S): 2 INJECTION INTRAMUSCULAR; INTRAVENOUS; SUBCUTANEOUS at 06:00

## 2019-02-01 RX ADMIN — HYDROMORPHONE HYDROCHLORIDE 0.5 MILLIGRAM(S): 2 INJECTION INTRAMUSCULAR; INTRAVENOUS; SUBCUTANEOUS at 14:00

## 2019-02-01 RX ADMIN — HYDROMORPHONE HYDROCHLORIDE 0.5 MILLIGRAM(S): 2 INJECTION INTRAMUSCULAR; INTRAVENOUS; SUBCUTANEOUS at 09:16

## 2019-02-01 RX ADMIN — HYDROMORPHONE HYDROCHLORIDE 0.5 MILLIGRAM(S): 2 INJECTION INTRAMUSCULAR; INTRAVENOUS; SUBCUTANEOUS at 05:26

## 2019-02-01 RX ADMIN — HYDROMORPHONE HYDROCHLORIDE 0.5 MILLIGRAM(S): 2 INJECTION INTRAMUSCULAR; INTRAVENOUS; SUBCUTANEOUS at 00:48

## 2019-02-01 RX ADMIN — Medication 100 MILLIEQUIVALENT(S): at 12:18

## 2019-02-01 RX ADMIN — HYDROMORPHONE HYDROCHLORIDE 0.5 MILLIGRAM(S): 2 INJECTION INTRAMUSCULAR; INTRAVENOUS; SUBCUTANEOUS at 13:22

## 2019-02-01 RX ADMIN — Medication 100 MILLIEQUIVALENT(S): at 11:08

## 2019-02-01 RX ADMIN — HEPARIN SODIUM 5000 UNIT(S): 5000 INJECTION INTRAVENOUS; SUBCUTANEOUS at 18:05

## 2019-02-01 RX ADMIN — PIPERACILLIN AND TAZOBACTAM 200 GRAM(S): 4; .5 INJECTION, POWDER, LYOPHILIZED, FOR SOLUTION INTRAVENOUS at 06:34

## 2019-02-01 RX ADMIN — HYDROMORPHONE HYDROCHLORIDE 0.5 MILLIGRAM(S): 2 INJECTION INTRAMUSCULAR; INTRAVENOUS; SUBCUTANEOUS at 19:00

## 2019-02-01 NOTE — PROGRESS NOTE ADULT - SUBJECTIVE AND OBJECTIVE BOX
52 yo woman s/p CT guided drains placed for perforated diverticulitis, intra-abdominal abscesses on 1/28/19.  24 hour drainage: Right lower quadrant: 43 to 46 mL, right upper quadrant 28 to 12 mL.  Flushed both drains easily with 4 cc of saline. No resistance.    Consideration can be made to remove one or both drains when the output has been less than 10-20 cc for two days. Please contact IR if there are any further questions or concerns.

## 2019-02-01 NOTE — PROGRESS NOTE ADULT - SUBJECTIVE AND OBJECTIVE BOX
24 hr events:  O/N: CT w/ resolution of RLQ collection and no change in mid abdominal collection, JPs both in correct position, JEROME, afebrile, tachy improving (HR around 98), JPX2 RUQ 2/12, RLQ 12.5/45.5  1/31: CT abd/pel PO & IV, t max 100.5      SUBJECTIVE:  Pt seen and examined at bedside by chief resident. Pt laying comfortably on bed. Denies pain.     Vital Signs Last 24 Hrs  T(C): 36.9 (01 Feb 2019 09:01), Max: 37.1 (31 Jan 2019 15:10)  T(F): 98.4 (01 Feb 2019 09:01), Max: 98.8 (31 Jan 2019 15:10)  HR: 99 (01 Feb 2019 09:01) (98 - 122)  BP: 134/91 (01 Feb 2019 09:01) (106/62 - 134/91)  RR: 16 (01 Feb 2019 09:01) (16 - 16)  SpO2: 94% (01 Feb 2019 09:01) (92% - 97%)    Physical Exam:  General: NAD  Pulmonary: Nonlabored breathing, no respiratory distress  Cardiovascular: tachy 90s-100s  Abdominal: soft, NT/ND. EMILIE x 2  Extremities: warm and well perfused  Neuro: A/O x3, no focal deficits, normal sensation    Lines/drains/tubes:  EMILIE RUQ  EMILIE RLQ    I&O's Summary    31 Jan 2019 07:01  -  01 Feb 2019 07:00  --------------------------------------------------------  IN: 2990 mL / OUT: 1507.5 mL / NET: 1482.5 mL    01 Feb 2019 07:01  -  01 Feb 2019 14:13  --------------------------------------------------------  IN: 650 mL / OUT: 32 mL / NET: 618 mL        LABS:                        9.0    8.1   )-----------( 584      ( 01 Feb 2019 06:43 )             29.2     02-01    138  |  100  |  4<L>  ----------------------------<  82  3.2<L>   |  28  |  0.56    Ca    8.4      01 Feb 2019 06:43  Phos  3.8     02-01  Mg     2.0     02-01

## 2019-02-02 LAB
ANION GAP SERPL CALC-SCNC: 6 MMOL/L — SIGNIFICANT CHANGE UP (ref 5–17)
BUN SERPL-MCNC: 2 MG/DL — LOW (ref 7–23)
CALCIUM SERPL-MCNC: 8.5 MG/DL — SIGNIFICANT CHANGE UP (ref 8.4–10.5)
CHLORIDE SERPL-SCNC: 105 MMOL/L — SIGNIFICANT CHANGE UP (ref 96–108)
CO2 SERPL-SCNC: 29 MMOL/L — SIGNIFICANT CHANGE UP (ref 22–31)
CREAT SERPL-MCNC: 0.48 MG/DL — LOW (ref 0.5–1.3)
GLUCOSE SERPL-MCNC: 99 MG/DL — SIGNIFICANT CHANGE UP (ref 70–99)
HCT VFR BLD CALC: 30.2 % — LOW (ref 34.5–45)
HGB BLD-MCNC: 9.3 G/DL — LOW (ref 11.5–15.5)
MAGNESIUM SERPL-MCNC: 1.9 MG/DL — SIGNIFICANT CHANGE UP (ref 1.6–2.6)
MCHC RBC-ENTMCNC: 27.2 PG — SIGNIFICANT CHANGE UP (ref 27–34)
MCHC RBC-ENTMCNC: 30.8 G/DL — LOW (ref 32–36)
MCV RBC AUTO: 88.3 FL — SIGNIFICANT CHANGE UP (ref 80–100)
PHOSPHATE SERPL-MCNC: 2.9 MG/DL — SIGNIFICANT CHANGE UP (ref 2.5–4.5)
PLATELET # BLD AUTO: 683 K/UL — HIGH (ref 150–400)
POTASSIUM SERPL-MCNC: 3.5 MMOL/L — SIGNIFICANT CHANGE UP (ref 3.5–5.3)
POTASSIUM SERPL-SCNC: 3.5 MMOL/L — SIGNIFICANT CHANGE UP (ref 3.5–5.3)
RBC # BLD: 3.42 M/UL — LOW (ref 3.8–5.2)
RBC # FLD: 13.7 % — SIGNIFICANT CHANGE UP (ref 10.3–16.9)
SODIUM SERPL-SCNC: 140 MMOL/L — SIGNIFICANT CHANGE UP (ref 135–145)
WBC # BLD: 7.1 K/UL — SIGNIFICANT CHANGE UP (ref 3.8–10.5)
WBC # FLD AUTO: 7.1 K/UL — SIGNIFICANT CHANGE UP (ref 3.8–10.5)

## 2019-02-02 RX ORDER — CIPROFLOXACIN LACTATE 400MG/40ML
500 VIAL (ML) INTRAVENOUS EVERY 12 HOURS
Qty: 0 | Refills: 0 | Status: DISCONTINUED | OUTPATIENT
Start: 2019-02-02 | End: 2019-02-05

## 2019-02-02 RX ORDER — POTASSIUM CHLORIDE 20 MEQ
20 PACKET (EA) ORAL
Qty: 0 | Refills: 0 | Status: COMPLETED | OUTPATIENT
Start: 2019-02-02 | End: 2019-02-02

## 2019-02-02 RX ORDER — MAGNESIUM OXIDE 400 MG ORAL TABLET 241.3 MG
400 TABLET ORAL ONCE
Qty: 0 | Refills: 0 | Status: COMPLETED | OUTPATIENT
Start: 2019-02-02 | End: 2019-02-02

## 2019-02-02 RX ADMIN — HYDROMORPHONE HYDROCHLORIDE 0.5 MILLIGRAM(S): 2 INJECTION INTRAMUSCULAR; INTRAVENOUS; SUBCUTANEOUS at 05:45

## 2019-02-02 RX ADMIN — HYDROMORPHONE HYDROCHLORIDE 0.5 MILLIGRAM(S): 2 INJECTION INTRAMUSCULAR; INTRAVENOUS; SUBCUTANEOUS at 23:55

## 2019-02-02 RX ADMIN — Medication 20 MILLIEQUIVALENT(S): at 12:29

## 2019-02-02 RX ADMIN — HYDROMORPHONE HYDROCHLORIDE 0.5 MILLIGRAM(S): 2 INJECTION INTRAMUSCULAR; INTRAVENOUS; SUBCUTANEOUS at 22:14

## 2019-02-02 RX ADMIN — HYDROMORPHONE HYDROCHLORIDE 0.5 MILLIGRAM(S): 2 INJECTION INTRAMUSCULAR; INTRAVENOUS; SUBCUTANEOUS at 13:20

## 2019-02-02 RX ADMIN — PIPERACILLIN AND TAZOBACTAM 200 GRAM(S): 4; .5 INJECTION, POWDER, LYOPHILIZED, FOR SOLUTION INTRAVENOUS at 00:04

## 2019-02-02 RX ADMIN — Medication 500 MILLIGRAM(S): at 17:31

## 2019-02-02 RX ADMIN — HYDROMORPHONE HYDROCHLORIDE 0.5 MILLIGRAM(S): 2 INJECTION INTRAMUSCULAR; INTRAVENOUS; SUBCUTANEOUS at 18:30

## 2019-02-02 RX ADMIN — Medication 20 MILLIEQUIVALENT(S): at 13:20

## 2019-02-02 RX ADMIN — HYDROMORPHONE HYDROCHLORIDE 0.5 MILLIGRAM(S): 2 INJECTION INTRAMUSCULAR; INTRAVENOUS; SUBCUTANEOUS at 05:02

## 2019-02-02 RX ADMIN — HEPARIN SODIUM 5000 UNIT(S): 5000 INJECTION INTRAVENOUS; SUBCUTANEOUS at 17:32

## 2019-02-02 RX ADMIN — PIPERACILLIN AND TAZOBACTAM 200 GRAM(S): 4; .5 INJECTION, POWDER, LYOPHILIZED, FOR SOLUTION INTRAVENOUS at 06:49

## 2019-02-02 RX ADMIN — PIPERACILLIN AND TAZOBACTAM 200 GRAM(S): 4; .5 INJECTION, POWDER, LYOPHILIZED, FOR SOLUTION INTRAVENOUS at 12:29

## 2019-02-02 RX ADMIN — MAGNESIUM OXIDE 400 MG ORAL TABLET 400 MILLIGRAM(S): 241.3 TABLET ORAL at 09:17

## 2019-02-02 RX ADMIN — HEPARIN SODIUM 5000 UNIT(S): 5000 INJECTION INTRAVENOUS; SUBCUTANEOUS at 05:02

## 2019-02-02 RX ADMIN — SODIUM CHLORIDE 90 MILLILITER(S): 9 INJECTION, SOLUTION INTRAVENOUS at 00:04

## 2019-02-02 RX ADMIN — Medication 20 MILLIEQUIVALENT(S): at 09:16

## 2019-02-02 RX ADMIN — HYDROMORPHONE HYDROCHLORIDE 0.5 MILLIGRAM(S): 2 INJECTION INTRAMUSCULAR; INTRAVENOUS; SUBCUTANEOUS at 09:17

## 2019-02-02 RX ADMIN — HYDROMORPHONE HYDROCHLORIDE 0.5 MILLIGRAM(S): 2 INJECTION INTRAMUSCULAR; INTRAVENOUS; SUBCUTANEOUS at 10:15

## 2019-02-02 RX ADMIN — HYDROMORPHONE HYDROCHLORIDE 0.5 MILLIGRAM(S): 2 INJECTION INTRAMUSCULAR; INTRAVENOUS; SUBCUTANEOUS at 14:00

## 2019-02-02 RX ADMIN — HYDROMORPHONE HYDROCHLORIDE 0.5 MILLIGRAM(S): 2 INJECTION INTRAMUSCULAR; INTRAVENOUS; SUBCUTANEOUS at 17:32

## 2019-02-02 NOTE — PROGRESS NOTE ADULT - ATTENDING COMMENTS
54 yo F w/ no PMHx and PSHx of sinus surgery, presenting from [facility] w/ perforated diverticulitis w/ feculent peritonitis s/p IR drainage x2.  Remains afebrile with no leukocytosis  Abdomen soft, NT/ND    cultures sensitive to Cipro, will transition to PO abx   advance to fulls  dispo planning with drain

## 2019-02-02 NOTE — PROGRESS NOTE ADULT - SUBJECTIVE AND OBJECTIVE BOX
ID: 52 yo F w/ no PMHx and PSHx of sinus surgery, presenting from [facility] w/ perforated diverticulitis w/ feculent peritonitis s/p IR drainage x2.    24 Hour Events: Culture sensitive to ciprofloxacin and zosyn, switched to ciprofloxacin PO    SUBJECTIVE: +F/+BM      OBJECTIVE:    Vital Signs:  Vital Signs Last 24 Hrs  T(C): 36.7 (02 Feb 2019 08:23), Max: 37.2 (01 Feb 2019 18:14)  T(F): 98 (02 Feb 2019 08:23), Max: 99 (01 Feb 2019 18:14)  HR: 96 (02 Feb 2019 08:23) (69 - 102)  BP: 114/57 (02 Feb 2019 08:23) (104/61 - 139/82)  BP(mean): --  RR: 16 (02 Feb 2019 08:23) (16 - 18)  SpO2: 95% (02 Feb 2019 08:23) (92% - 98%)    Physical Exam:  General: NAD  Pulmonary: Nonlabored breathing, no respiratory distress  Cardiovascular: No heaves/thrills  Abdominal: Soft, NT/ND, RUQ drain with opaque red output, RLQ drain with dark green feculent output  Extremities: WWP, no clubbing/cyanosis/edema  Neuro: AAO x3, no focal deficits    Lines/Drains/Tubes:    Ins and Outs:  I&O's Summary    01 Feb 2019 07:01  -  02 Feb 2019 07:00  --------------------------------------------------------  IN: 3120 mL / OUT: 1267 mL / NET: 1853 mL    02 Feb 2019 07:01  -  02 Feb 2019 12:38  --------------------------------------------------------  IN: 0 mL / OUT: 100 mL / NET: -100 mL        Labs:                        9.3    7.1   )-----------( 683      ( 02 Feb 2019 06:22 )             30.2     02-02    140  |  105  |  2<L>  ----------------------------<  99  3.5   |  29  |  0.48<L>    Ca    8.5      02 Feb 2019 06:22  Phos  2.9     02-02  Mg     1.9     02-02          CAPILLARY BLOOD GLUCOSE            Radiology & Additional Studies:

## 2019-02-03 LAB
ANION GAP SERPL CALC-SCNC: 10 MMOL/L — SIGNIFICANT CHANGE UP (ref 5–17)
BUN SERPL-MCNC: <1 MG/DL — LOW (ref 7–23)
CALCIUM SERPL-MCNC: 8.7 MG/DL — SIGNIFICANT CHANGE UP (ref 8.4–10.5)
CHLORIDE SERPL-SCNC: 103 MMOL/L — SIGNIFICANT CHANGE UP (ref 96–108)
CO2 SERPL-SCNC: 26 MMOL/L — SIGNIFICANT CHANGE UP (ref 22–31)
CREAT SERPL-MCNC: 0.54 MG/DL — SIGNIFICANT CHANGE UP (ref 0.5–1.3)
CULTURE RESULTS: SIGNIFICANT CHANGE UP
CULTURE RESULTS: SIGNIFICANT CHANGE UP
GLUCOSE SERPL-MCNC: 94 MG/DL — SIGNIFICANT CHANGE UP (ref 70–99)
HCT VFR BLD CALC: 32.4 % — LOW (ref 34.5–45)
HGB BLD-MCNC: 10.1 G/DL — LOW (ref 11.5–15.5)
MAGNESIUM SERPL-MCNC: 2 MG/DL — SIGNIFICANT CHANGE UP (ref 1.6–2.6)
MCHC RBC-ENTMCNC: 27.6 PG — SIGNIFICANT CHANGE UP (ref 27–34)
MCHC RBC-ENTMCNC: 31.2 G/DL — LOW (ref 32–36)
MCV RBC AUTO: 88.5 FL — SIGNIFICANT CHANGE UP (ref 80–100)
PHOSPHATE SERPL-MCNC: 3.1 MG/DL — SIGNIFICANT CHANGE UP (ref 2.5–4.5)
PLATELET # BLD AUTO: 774 K/UL — HIGH (ref 150–400)
POTASSIUM SERPL-MCNC: 4.2 MMOL/L — SIGNIFICANT CHANGE UP (ref 3.5–5.3)
POTASSIUM SERPL-SCNC: 4.2 MMOL/L — SIGNIFICANT CHANGE UP (ref 3.5–5.3)
RBC # BLD: 3.66 M/UL — LOW (ref 3.8–5.2)
RBC # FLD: 14 % — SIGNIFICANT CHANGE UP (ref 10.3–16.9)
SODIUM SERPL-SCNC: 139 MMOL/L — SIGNIFICANT CHANGE UP (ref 135–145)
SPECIMEN SOURCE: SIGNIFICANT CHANGE UP
SPECIMEN SOURCE: SIGNIFICANT CHANGE UP
WBC # BLD: 10.2 K/UL — SIGNIFICANT CHANGE UP (ref 3.8–10.5)
WBC # FLD AUTO: 10.2 K/UL — SIGNIFICANT CHANGE UP (ref 3.8–10.5)

## 2019-02-03 RX ORDER — ACETAMINOPHEN 500 MG
650 TABLET ORAL EVERY 6 HOURS
Qty: 0 | Refills: 0 | Status: DISCONTINUED | OUTPATIENT
Start: 2019-02-03 | End: 2019-02-03

## 2019-02-03 RX ORDER — ACETAMINOPHEN 500 MG
650 TABLET ORAL EVERY 6 HOURS
Qty: 0 | Refills: 0 | Status: DISCONTINUED | OUTPATIENT
Start: 2019-02-03 | End: 2019-02-05

## 2019-02-03 RX ORDER — SODIUM CHLORIDE 9 MG/ML
500 INJECTION, SOLUTION INTRAVENOUS ONCE
Qty: 0 | Refills: 0 | Status: COMPLETED | OUTPATIENT
Start: 2019-02-03 | End: 2019-02-03

## 2019-02-03 RX ORDER — OXYCODONE HYDROCHLORIDE 5 MG/1
5 TABLET ORAL EVERY 6 HOURS
Qty: 0 | Refills: 0 | Status: DISCONTINUED | OUTPATIENT
Start: 2019-02-03 | End: 2019-02-05

## 2019-02-03 RX ADMIN — HYDROMORPHONE HYDROCHLORIDE 0.5 MILLIGRAM(S): 2 INJECTION INTRAMUSCULAR; INTRAVENOUS; SUBCUTANEOUS at 17:00

## 2019-02-03 RX ADMIN — HYDROMORPHONE HYDROCHLORIDE 0.5 MILLIGRAM(S): 2 INJECTION INTRAMUSCULAR; INTRAVENOUS; SUBCUTANEOUS at 12:00

## 2019-02-03 RX ADMIN — HYDROMORPHONE HYDROCHLORIDE 0.5 MILLIGRAM(S): 2 INJECTION INTRAMUSCULAR; INTRAVENOUS; SUBCUTANEOUS at 06:21

## 2019-02-03 RX ADMIN — HYDROMORPHONE HYDROCHLORIDE 0.5 MILLIGRAM(S): 2 INJECTION INTRAMUSCULAR; INTRAVENOUS; SUBCUTANEOUS at 16:08

## 2019-02-03 RX ADMIN — OXYCODONE HYDROCHLORIDE 5 MILLIGRAM(S): 5 TABLET ORAL at 21:11

## 2019-02-03 RX ADMIN — HYDROMORPHONE HYDROCHLORIDE 0.5 MILLIGRAM(S): 2 INJECTION INTRAMUSCULAR; INTRAVENOUS; SUBCUTANEOUS at 07:15

## 2019-02-03 RX ADMIN — SODIUM CHLORIDE 2000 MILLILITER(S): 9 INJECTION, SOLUTION INTRAVENOUS at 11:59

## 2019-02-03 RX ADMIN — HYDROMORPHONE HYDROCHLORIDE 0.5 MILLIGRAM(S): 2 INJECTION INTRAMUSCULAR; INTRAVENOUS; SUBCUTANEOUS at 13:00

## 2019-02-03 RX ADMIN — Medication 500 MILLIGRAM(S): at 17:06

## 2019-02-03 RX ADMIN — HEPARIN SODIUM 5000 UNIT(S): 5000 INJECTION INTRAVENOUS; SUBCUTANEOUS at 06:21

## 2019-02-03 RX ADMIN — OXYCODONE HYDROCHLORIDE 5 MILLIGRAM(S): 5 TABLET ORAL at 22:23

## 2019-02-03 RX ADMIN — Medication 500 MILLIGRAM(S): at 06:21

## 2019-02-03 NOTE — PROGRESS NOTE ADULT - SUBJECTIVE AND OBJECTIVE BOX
54 yo F s/p CT guided drains placed for perforated diveritculitis. The RUQ drain was removed by primary with 5.5 cc out noted and had remained low from 5 cc in previous 24 hour. The RLQ drain also has noted 20 cc/24 hour of stool like output from 110 cc/previous 244 h period.

## 2019-02-03 NOTE — PROGRESS NOTE ADULT - SUBJECTIVE AND OBJECTIVE BOX
24 hr events:  O/N: Tolerating CLD, +F/+BM  2/02: +F/+BM, drains flushed, DC zosyn, started cipro PO    SUBJECTIVE:  No acute complaints. Denies Pain. +F/+BM. -N/-V. Tolerating FLD. Denies CP/SOB.    Vital Signs Last 24 Hrs  T(C): 36.1 (03 Feb 2019 08:21), Max: 36.8 (03 Feb 2019 05:15)  T(F): 97 (03 Feb 2019 08:21), Max: 98.2 (03 Feb 2019 05:15)  HR: 118 (03 Feb 2019 08:21) (89 - 118)  BP: 136/68 (03 Feb 2019 08:21) (105/56 - 136/68)  BP(mean): --  RR: 16 (03 Feb 2019 08:21) (16 - 18)  SpO2: 98% (03 Feb 2019 08:21) (96% - 100%)    Physical Exam:  General: NAD  Pulmonary: Nonlabored breathing, no respiratory distress  Abdominal: soft, NT/ND; IR drain x2; RUQ drain with minimal output; Lower drain with feculant output in tubing  Neuro: A/O x3    I&O's Summary  02 Feb 2019 07:01  -  03 Feb 2019 07:00  --------------------------------------------------------  IN: 1510 mL / OUT: 175.5 mL / NET: 1334.5 mL    03 Feb 2019 07:01  -  03 Feb 2019 09:19  --------------------------------------------------------  IN: 0 mL / OUT: 300 mL / NET: -300 mL    LABS:                      10.1   10.2  )-----------( 774      ( 03 Feb 2019 06:59 )             32.4     02-03  139  |  103  |  <1<L>  ----------------------------<  94  4.2   |  26  |  0.54    Ca    8.7      03 Feb 2019 06:59  Phos  3.1     02-03  Mg     2.0     02-03

## 2019-02-04 ENCOUNTER — TRANSCRIPTION ENCOUNTER (OUTPATIENT)
Age: 54
End: 2019-02-04

## 2019-02-04 PROBLEM — Z00.00 ENCOUNTER FOR PREVENTIVE HEALTH EXAMINATION: Status: ACTIVE | Noted: 2019-02-04

## 2019-02-04 PROBLEM — K57.92 DIVERTICULITIS OF INTESTINE, PART UNSPECIFIED, WITHOUT PERFORATION OR ABSCESS WITHOUT BLEEDING: Chronic | Status: ACTIVE | Noted: 2019-01-28

## 2019-02-04 LAB
ANION GAP SERPL CALC-SCNC: 8 MMOL/L — SIGNIFICANT CHANGE UP (ref 5–17)
BUN SERPL-MCNC: 5 MG/DL — LOW (ref 7–23)
CALCIUM SERPL-MCNC: 9.2 MG/DL — SIGNIFICANT CHANGE UP (ref 8.4–10.5)
CHLORIDE SERPL-SCNC: 101 MMOL/L — SIGNIFICANT CHANGE UP (ref 96–108)
CO2 SERPL-SCNC: 28 MMOL/L — SIGNIFICANT CHANGE UP (ref 22–31)
CREAT SERPL-MCNC: 0.63 MG/DL — SIGNIFICANT CHANGE UP (ref 0.5–1.3)
GLUCOSE SERPL-MCNC: 114 MG/DL — HIGH (ref 70–99)
HCT VFR BLD CALC: 35.1 % — SIGNIFICANT CHANGE UP (ref 34.5–45)
HGB BLD-MCNC: 10.8 G/DL — LOW (ref 11.5–15.5)
MAGNESIUM SERPL-MCNC: 2.1 MG/DL — SIGNIFICANT CHANGE UP (ref 1.6–2.6)
MCHC RBC-ENTMCNC: 27.6 PG — SIGNIFICANT CHANGE UP (ref 27–34)
MCHC RBC-ENTMCNC: 30.8 G/DL — LOW (ref 32–36)
MCV RBC AUTO: 89.8 FL — SIGNIFICANT CHANGE UP (ref 80–100)
PHOSPHATE SERPL-MCNC: 4.1 MG/DL — SIGNIFICANT CHANGE UP (ref 2.5–4.5)
PLATELET # BLD AUTO: 872 K/UL — HIGH (ref 150–400)
POTASSIUM SERPL-MCNC: 4.3 MMOL/L — SIGNIFICANT CHANGE UP (ref 3.5–5.3)
POTASSIUM SERPL-SCNC: 4.3 MMOL/L — SIGNIFICANT CHANGE UP (ref 3.5–5.3)
RBC # BLD: 3.91 M/UL — SIGNIFICANT CHANGE UP (ref 3.8–5.2)
RBC # FLD: 14.5 % — SIGNIFICANT CHANGE UP (ref 10.3–16.9)
SODIUM SERPL-SCNC: 137 MMOL/L — SIGNIFICANT CHANGE UP (ref 135–145)
WBC # BLD: 14.1 K/UL — HIGH (ref 3.8–10.5)
WBC # FLD AUTO: 14.1 K/UL — HIGH (ref 3.8–10.5)

## 2019-02-04 PROCEDURE — 74177 CT ABD & PELVIS W/CONTRAST: CPT | Mod: 26

## 2019-02-04 RX ORDER — IOHEXOL 300 MG/ML
30 INJECTION, SOLUTION INTRAVENOUS ONCE
Qty: 0 | Refills: 0 | Status: COMPLETED | OUTPATIENT
Start: 2019-02-04 | End: 2019-02-04

## 2019-02-04 RX ORDER — METRONIDAZOLE 500 MG
500 TABLET ORAL EVERY 8 HOURS
Qty: 0 | Refills: 0 | Status: DISCONTINUED | OUTPATIENT
Start: 2019-02-04 | End: 2019-02-05

## 2019-02-04 RX ADMIN — OXYCODONE HYDROCHLORIDE 5 MILLIGRAM(S): 5 TABLET ORAL at 11:31

## 2019-02-04 RX ADMIN — Medication 500 MILLIGRAM(S): at 13:21

## 2019-02-04 RX ADMIN — Medication 500 MILLIGRAM(S): at 05:11

## 2019-02-04 RX ADMIN — HEPARIN SODIUM 5000 UNIT(S): 5000 INJECTION INTRAVENOUS; SUBCUTANEOUS at 18:51

## 2019-02-04 RX ADMIN — OXYCODONE HYDROCHLORIDE 5 MILLIGRAM(S): 5 TABLET ORAL at 06:17

## 2019-02-04 RX ADMIN — Medication 500 MILLIGRAM(S): at 21:35

## 2019-02-04 RX ADMIN — OXYCODONE HYDROCHLORIDE 5 MILLIGRAM(S): 5 TABLET ORAL at 18:51

## 2019-02-04 RX ADMIN — OXYCODONE HYDROCHLORIDE 5 MILLIGRAM(S): 5 TABLET ORAL at 12:00

## 2019-02-04 RX ADMIN — IOHEXOL 30 MILLILITER(S): 300 INJECTION, SOLUTION INTRAVENOUS at 13:22

## 2019-02-04 RX ADMIN — Medication 500 MILLIGRAM(S): at 18:51

## 2019-02-04 RX ADMIN — HEPARIN SODIUM 5000 UNIT(S): 5000 INJECTION INTRAVENOUS; SUBCUTANEOUS at 05:11

## 2019-02-04 RX ADMIN — Medication 500 MILLIGRAM(S): at 07:48

## 2019-02-04 RX ADMIN — OXYCODONE HYDROCHLORIDE 5 MILLIGRAM(S): 5 TABLET ORAL at 05:11

## 2019-02-04 NOTE — DISCHARGE NOTE ADULT - PATIENT PORTAL LINK FT
You can access the [facility] Patient Portal, offered by [facility], by registering with the following website: [email or web address]

## 2019-02-04 NOTE — DISCHARGE NOTE ADULT - HOSPITAL COURSE
Ms. Driscoll is a 54 yo F w/ no PMHx and PSHx of sinus surgery, presenting from [facility] w/ perforated diverticulitis. Pt reports her symptoms began in the middle of December as generalized abdominal pain associated with diarrhea, no nauesa/vomiting, fevers, chills. Pt presented to [facility] on 12/18/18 and CT showed severe diverticulitis. Pt declined hospital admission at the time and was treated with outpatient oral abx. Pt reports persistent abdominal pain and diarrhea since this first presentation on 12/18. Pt denies any worsening symptoms over this time period, fever, nausea, vomiting, chills. Pt re-presented to [facility] today due to her persistent symptoms. Pt has never had similar episodes in the past. Pt denies hx of C scope.    In ER pt was afebrile, HR in 100's with WBC of 16. CT shows perforated diverticulitis of sigmoid w/ multiple abscesses measuring up to 14cm in abd/pl. During hospital stay, pt underwent 2 IR drainages with drain kept in place. On 2/3 1 drain was removed. On 2/4 CT abd/pl was performed with decrease in size of abdominal collections. She remained afebrile and hemodynamically stable 48 hours prior to discharge. Pt tolerated PO intake without N/V, and voided adequately. At time of discharge pt was stable.

## 2019-02-04 NOTE — PROGRESS NOTE ADULT - SUBJECTIVE AND OBJECTIVE BOX
s/p IR drainage for perforated diverticulitis      SUBJECTIVE: Pt complaints of itch on back. Admits to BF. Tolerating diet. Pain well controlled      MEDICATIONS  (STANDING):  ciprofloxacin     Tablet 500 milliGRAM(s) Oral every 12 hours  heparin  Injectable 5000 Unit(s) SubCutaneous every 12 hours    MEDICATIONS  (PRN):  acetaminophen   Tablet .. 650 milliGRAM(s) Oral every 6 hours PRN Moderate Pain (4 - 6)  ondansetron Injectable 4 milliGRAM(s) IV Push every 6 hours PRN Nausea  oxyCODONE    IR 5 milliGRAM(s) Oral every 6 hours PRN Severe Pain (7 - 10)      Vital Signs Last 24 Hrs  T(C): 36.6 (04 Feb 2019 04:19), Max: 37.3 (03 Feb 2019 16:52)  T(F): 97.9 (04 Feb 2019 04:19), Max: 99.1 (03 Feb 2019 16:52)  HR: 84 (04 Feb 2019 04:19) (84 - 118)  BP: 100/66 (04 Feb 2019 04:19) (100/66 - 136/68)  BP(mean): --  RR: 18 (04 Feb 2019 04:19) (16 - 18)  SpO2: 96% (04 Feb 2019 04:19) (94% - 98%)    PHYSICAL EXAM:      Constitutional: A&Ox3    Respiratory: non labored breathing, no respiratory distress    Cardiovascular: NSR, RRR    Gastrointestinal: Soft ND, NT, no rebound or guarding. EMILIE w/ feculent material.     Genitourinary: voiding     Extremities: (-) edema                  I&O's Detail    03 Feb 2019 07:01  -  04 Feb 2019 07:00  --------------------------------------------------------  IN:    IV PiggyBack: 500 mL    Oral Fluid: 760 mL  Total IN: 1260 mL    OUT:    Bulb: 45 mL    Voided: 600 mL  Total OUT: 645 mL    Total NET: 615 mL          LABS:                        10.8   14.1  )-----------( 872      ( 04 Feb 2019 06:14 )             35.1     02-04    137  |  101  |  5<L>  ----------------------------<  114<H>  4.3   |  28  |  0.63    Ca    9.2      04 Feb 2019 06:14  Phos  4.1     02-04  Mg     2.1     02-04            RADIOLOGY & ADDITIONAL STUDIES: s/p IR drainage for perforated diverticulitis      SUBJECTIVE: Pt complaints of itch on back. Admits to BF. Tolerating diet. Pain well controlled      MEDICATIONS  (STANDING):  ciprofloxacin     Tablet 500 milliGRAM(s) Oral every 12 hours  heparin  Injectable 5000 Unit(s) SubCutaneous every 12 hours    MEDICATIONS  (PRN):  acetaminophen   Tablet .. 650 milliGRAM(s) Oral every 6 hours PRN Moderate Pain (4 - 6)  ondansetron Injectable 4 milliGRAM(s) IV Push every 6 hours PRN Nausea  oxyCODONE    IR 5 milliGRAM(s) Oral every 6 hours PRN Severe Pain (7 - 10)      Vital Signs Last 24 Hrs  T(C): 36.6 (04 Feb 2019 04:19), Max: 37.3 (03 Feb 2019 16:52)  T(F): 97.9 (04 Feb 2019 04:19), Max: 99.1 (03 Feb 2019 16:52)  HR: 84 (04 Feb 2019 04:19) (84 - 118)  BP: 100/66 (04 Feb 2019 04:19) (100/66 - 136/68)  BP(mean): --  RR: 18 (04 Feb 2019 04:19) (16 - 18)  SpO2: 96% (04 Feb 2019 04:19) (94% - 98%)    PHYSICAL EXAM:      Constitutional: A&Ox3    Respiratory: non labored breathing, no respiratory distress    Cardiovascular: NSR, RRR    Back: No rash    Gastrointestinal: Soft ND, NT, no rebound or guarding. EMILIE w/ feculent material.     Genitourinary: voiding     Extremities: (-) edema                  I&O's Detail    03 Feb 2019 07:01  -  04 Feb 2019 07:00  --------------------------------------------------------  IN:    IV PiggyBack: 500 mL    Oral Fluid: 760 mL  Total IN: 1260 mL    OUT:    Bulb: 45 mL    Voided: 600 mL  Total OUT: 645 mL    Total NET: 615 mL          LABS:                        10.8   14.1  )-----------( 872      ( 04 Feb 2019 06:14 )             35.1     02-04    137  |  101  |  5<L>  ----------------------------<  114<H>  4.3   |  28  |  0.63    Ca    9.2      04 Feb 2019 06:14  Phos  4.1     02-04  Mg     2.1     02-04            RADIOLOGY & ADDITIONAL STUDIES:

## 2019-02-04 NOTE — DISCHARGE NOTE ADULT - NS MD DC FALL RISK RISK
Message left for patients mother Raven to call in regards to which Albuterol medication is needed.   For information on Fall & Injury Prevention, visit [email or web address]

## 2019-02-04 NOTE — CHART NOTE - NSCHARTNOTEFT_GEN_A_CORE
Admitting Diagnosis:   Patient is a 53y old  Female who presents with a chief complaint of Perforated Diverticulitis (04 Feb 2019 10:30)      PAST MEDICAL & SURGICAL HISTORY:  Diverticulitis  H/O sinus surgery      Current Nutrition Order:       PO Intake: Good (%) [   ]  Fair (50-75%) [   ] Poor (<25%) [   ]    GI Issues:     Pain:    Skin Integrity:    Labs:   02-04    137  |  101  |  5<L>  ----------------------------<  114<H>  4.3   |  28  |  0.63    Ca    9.2      04 Feb 2019 06:14  Phos  4.1     02-04  Mg     2.1     02-04      CAPILLARY BLOOD GLUCOSE          Medications:  MEDICATIONS  (STANDING):  ciprofloxacin     Tablet 500 milliGRAM(s) Oral every 12 hours  heparin  Injectable 5000 Unit(s) SubCutaneous every 12 hours  iohexol 300 mG (iodine)/mL Oral Solution 30 milliLiter(s) Oral once  metroNIDAZOLE    Tablet 500 milliGRAM(s) Oral every 8 hours    MEDICATIONS  (PRN):  acetaminophen   Tablet .. 650 milliGRAM(s) Oral every 6 hours PRN Moderate Pain (4 - 6)  ondansetron Injectable 4 milliGRAM(s) IV Push every 6 hours PRN Nausea  oxyCODONE    IR 5 milliGRAM(s) Oral every 6 hours PRN Severe Pain (7 - 10)      Weight:  No updated weights    Weight Change:   Please obtain current weight for further assessment    Nutrition Focused Physical Exam: Completed [   ]  Not Pertinent [   ]  Muscle Wasting- Temporal [   ]  Clavicle/Pectoral [   ]  Shoulder/Deltoid [   ]  Scapula [   ]  Interosseous [   ]  Quadriceps [   ]  Gastrocnemius [   ]  Fat Wasting- Orbital [   ]  Buccal [   ]  Triceps [   ]  Rib [   ]  Suspect [PCM] 2/2 to physical assessment, [poor intake], and [wt loss]; please see malnutrition chart note.    Estimated energy needs:     Subjective:     Previous Nutrition Diagnosis:    Active [   ]  Resolved [   ]    If resolved, new PES:     Goal:    Recommendations:    Education:     Risk Level: High [   ] Moderate [   ] Low [   ] Admitting Diagnosis:   Patient is a 53y old  Female who presents with a chief complaint of Perforated Diverticulitis (04 Feb 2019 10:30)    PAST MEDICAL & SURGICAL HISTORY:  Diverticulitis  H/O sinus surgery    Current Nutrition Order: Low fiber     PO Intake: Good (%) [   ]  Fair (50-75%) [ X ] Poor (<25%) [   ]    GI Issues:     Pain:    Skin Integrity:    Labs:   02-04    137  |  101  |  5<L>  ----------------------------<  114<H>  4.3   |  28  |  0.63    Ca    9.2      04 Feb 2019 06:14  Phos  4.1     02-04  Mg     2.1     02-04      CAPILLARY BLOOD GLUCOSE      Medications:  MEDICATIONS  (STANDING):  ciprofloxacin     Tablet 500 milliGRAM(s) Oral every 12 hours  heparin  Injectable 5000 Unit(s) SubCutaneous every 12 hours  iohexol 300 mG (iodine)/mL Oral Solution 30 milliLiter(s) Oral once  metroNIDAZOLE    Tablet 500 milliGRAM(s) Oral every 8 hours    MEDICATIONS  (PRN):  acetaminophen   Tablet .. 650 milliGRAM(s) Oral every 6 hours PRN Moderate Pain (4 - 6)  ondansetron Injectable 4 milliGRAM(s) IV Push every 6 hours PRN Nausea  oxyCODONE    IR 5 milliGRAM(s) Oral every 6 hours PRN Severe Pain (7 - 10)      Weight:  No updated weights    Weight Change:   Please obtain current weight for further assessment    Nutrition Focused Physical Exam: Completed [   ]  Not Pertinent [ X  ]  Suspect moderate protein calorie malnutrition 2/2 to Suspected <75% EER >/=1 month and ~7.4% wt loss x 2 months ; please see malnutrition chart note.    Estimated energy needs:     Subjective:     Previous Nutrition Diagnosis:    Active [   ]  Resolved [   ]    If resolved, new PES:     Goal:    Recommendations:    Education:     Risk Level: High [   ] Moderate [   ] Low [   ] Admitting Diagnosis:   Patient is a 53y old  Female who presents with a chief complaint of Perforated Diverticulitis (04 Feb 2019 10:30)    PAST MEDICAL & SURGICAL HISTORY:  Diverticulitis  H/O sinus surgery    Current Nutrition Order: Low fiber     PO Intake: Good (%) [   ]  Fair (50-75%) [ X ] Poor (<25%) [   ]    GI Issues:   Pt reports BM yesterday  denies N/V    Pain: denies pain    Skin Integrity: surgical incision abdomen    Labs:   02-04    137  |  101  |  5<L>  ----------------------------<  114<H>  4.3   |  28  |  0.63    Ca    9.2      04 Feb 2019 06:14  Phos  4.1     02-04  Mg     2.1     02-04    CAPILLARY BLOOD GLUCOSE    Medications:  MEDICATIONS  (STANDING):  ciprofloxacin     Tablet 500 milliGRAM(s) Oral every 12 hours  heparin  Injectable 5000 Unit(s) SubCutaneous every 12 hours  iohexol 300 mG (iodine)/mL Oral Solution 30 milliLiter(s) Oral once  metroNIDAZOLE    Tablet 500 milliGRAM(s) Oral every 8 hours    MEDICATIONS  (PRN):  acetaminophen   Tablet .. 650 milliGRAM(s) Oral every 6 hours PRN Moderate Pain (4 - 6)  ondansetron Injectable 4 milliGRAM(s) IV Push every 6 hours PRN Nausea  oxyCODONE    IR 5 milliGRAM(s) Oral every 6 hours PRN Severe Pain (7 - 10)    Weight:  No updated weights    Weight Change:   Please obtain current weight for further assessment    Nutrition Focused Physical Exam: Completed [   ]  Not Pertinent [ X  ]  Suspect moderate protein calorie malnutrition 2/2 to Suspected <75% EER >/=1 month and ~7.4% wt loss x 2 months ; please see malnutrition chart note.    Estimated energy needs:   Ht (1/31 per pt): 65in, Wt (1/31 per pt): 56.8kg, IBW: 125# +/-10%, %IBW: 100%, BMI: 20.8   ABW used to calculate energy needs due to pt's current body weight within % IBW. Needs adjusted for medical status, wt loss PTA.    5726-0132 kcal (25-30 kcal/kg), 57-68 gm (1.0-1.2 gm/kg), 0200-1653 mL (25-30 mL/kg)    Subjective: 52 yo F w/ no PMHx and PSHx of sinus surgery, presenting from [facility] w/ perforated diverticulitis w/ feculent peritonitis s/p IR drainage x2- One drain removed 2/3. Pt tolerating low fiber diet, consuming >50% of meals per report. Pt reports finding diet education handouts provided at initial assessment, not further dietary questions at this time. Will monitor and f/u per nutrition dept protocol.    Previous Nutrition Diagnosis:   Malnutrition; suspected moderate RT decreased ability to consume sufficient energy AEB suspected decreased appetite and 7.4% wt loss x2 months.    Active [ X  ]  Resolved [   ]    Goal: Pt to consistently meet % of est needs when diet advanced, avoid further s/s of malnutrition.    Recommendations:  1. Recommend continue low fiber diet, encourage PO intake.  2. Monitor labs and obtain weekly weights.    Education: Diet education provided at initial assessment 1/31- pt without further dietary questions at this time.    Risk Level: High [   ] Moderate [ X ] Low [   ] Admitting Diagnosis:   Patient is a 53y old  Female who presents with a chief complaint of Perforated Diverticulitis (04 Feb 2019 10:30)    PAST MEDICAL & SURGICAL HISTORY:  Diverticulitis  H/O sinus surgery    Current Nutrition Order: Low fiber     PO Intake: Good (%) [   ]  Fair (50-75%) [ X ] Poor (<25%) [   ]    GI Issues:   Pt reports BM yesterday  denies N/V    Pain: denies pain    Skin Integrity: surgical incision abdomen    Labs:   02-04    137  |  101  |  5<L>  ----------------------------<  114<H>  4.3   |  28  |  0.63    Ca    9.2      04 Feb 2019 06:14  Phos  4.1     02-04  Mg     2.1     02-04    CAPILLARY BLOOD GLUCOSE    Medications:  MEDICATIONS  (STANDING):  ciprofloxacin     Tablet 500 milliGRAM(s) Oral every 12 hours  heparin  Injectable 5000 Unit(s) SubCutaneous every 12 hours  iohexol 300 mG (iodine)/mL Oral Solution 30 milliLiter(s) Oral once  metroNIDAZOLE    Tablet 500 milliGRAM(s) Oral every 8 hours    MEDICATIONS  (PRN):  acetaminophen   Tablet .. 650 milliGRAM(s) Oral every 6 hours PRN Moderate Pain (4 - 6)  ondansetron Injectable 4 milliGRAM(s) IV Push every 6 hours PRN Nausea  oxyCODONE    IR 5 milliGRAM(s) Oral every 6 hours PRN Severe Pain (7 - 10)    Weight:  No updated weights    Weight Change:   Please obtain current weight for further assessment    Nutrition Focused Physical Exam: Completed [   ]  Not Pertinent [ X  ]  Suspect moderate protein calorie malnutrition 2/2 to Suspected <75% EER >/=1 month and ~7.4% wt loss x 2 months ; please see malnutrition chart note.    Estimated energy needs:   Ht (1/31 per pt): 65in, Wt (1/31 per pt): 56.8kg, IBW: 125# +/-10%, %IBW: 100%, BMI: 20.8   ABW used to calculate energy needs due to pt's current body weight within % IBW. Needs adjusted for medical status, wt loss PTA.    5570-8508 kcal (25-30 kcal/kg), 57-68 gm (1.0-1.2 gm/kg), 8204-5508 mL (25-30 mL/kg)    Subjective: 52 yo F w/ no PMHx and PSHx of sinus surgery, presenting from [facility] w/ perforated diverticulitis w/ feculent peritonitis s/p IR drainage x2- One drain removed 2/3. Pt tolerating low fiber diet, consuming >50% of meals per report. Pt reports finding diet education handouts provided at initial assessment useful, no further dietary questions at this time. Will monitor and f/u per nutrition dept protocol.    Previous Nutrition Diagnosis:   Malnutrition; suspected moderate RT decreased ability to consume sufficient energy AEB suspected decreased appetite and 7.4% wt loss x2 months.    Active [ X  ]  Resolved [   ]    Goal: Pt to consistently meet % of est needs when diet advanced, avoid further s/s of malnutrition.    Recommendations:  1. Recommend continue low fiber diet, encourage PO intake.  2. Monitor labs and obtain weekly weights.    Education: Diet education provided at initial assessment 1/31- pt without further dietary questions at this time.    Risk Level: High [   ] Moderate [ X ] Low [   ]

## 2019-02-04 NOTE — DISCHARGE NOTE ADULT - MEDICATION SUMMARY - MEDICATIONS TO TAKE
I will START or STAY ON the medications listed below when I get home from the hospital:    Flagyl 500 mg oral tablet  -- 1 tab(s) by mouth every 8 hours   -- Do not drink alcoholic beverages when taking this medication.  Finish all this medication unless otherwise directed by prescriber.  May discolor urine or feces.    -- Indication: For perforated diverticulitis    Cipro 500 mg oral tablet  -- 1 tab(s) by mouth 2 times a day   -- Avoid prolonged or excessive exposure to direct and/or artificial sunlight while taking this medication.  Check with your doctor before becoming pregnant.  Do not take dairy products, antacids, or iron preparations within one hour of this medication.  Finish all this medication unless otherwise directed by prescriber.  Medication should be taken with plenty of water.    -- Indication: For perforated diverticulitis

## 2019-02-04 NOTE — DISCHARGE NOTE ADULT - CARE PROVIDER_API CALL
Sergio Man)  Surgery  [address] [address]  Phone: [phone]  Fax: [phone]  Follow Up Time:

## 2019-02-04 NOTE — PROGRESS NOTE ADULT - SUBJECTIVE AND OBJECTIVE BOX
54 yo F s/p CT guided drains placed for perforated diverticulitis The RUQ drain was removed during admission. The RLQ drain also has noted 45 cc/24 hour of stool like output from 75 cc/previous 24 h period. Drain was flushed easily with 3 cc of saline.

## 2019-02-04 NOTE — DISCHARGE NOTE ADULT - PLAN OF CARE
Recovery Please follow up with Dr. Man in 1 week.  Call the office to schedule your appointment.  You may shower using soap and water over drain site.  Do not scrub drain site and pat dry when done.  No tub bathing or swimming until confirmation during your follow up appointment.  Keep incision sites out of the sun, as scars will darken.  Empty drain regularly and keep EMILIE bulb on suction.  Flush drain with 10cc of normal saline daily.  Ambulate as tolerated, but no heavy lifting (>10lbs) or strenuous exercise.  You may continue a low residue diet.  You should be urinating at least 3-4x per day.  Call the office if you experience increasing pain, abdominal pain, nausea, vomiting, or temperature >101.4F. Please follow up with Dr. Man in 1 week.  Call the office to schedule your appointment.  You may shower using soap and water over drain site.  Do not scrub drain site and pat dry when done.  No tub bathing or swimming until confirmation during your follow up appointment. Empty drain regularly and keep EMILIE bulb on suction.  Flush drain with 10cc of normal saline daily.  Ambulate as tolerated, but no heavy lifting (>10lbs) or strenuous exercise.  You may continue a low residue diet.  You should be urinating at least 3-4x per day.  Call the office if you experience increasing pain, abdominal pain, nausea, vomiting, or temperature >101.4F. Please follow up with Dr. Man on tuesday 2/12.  Call the office to schedule your appointment.  You may shower using soap and water over drain site.  Do not scrub drain site and pat dry when done.  No tub bathing or swimming until confirmation during your follow up appointment. Empty drain regularly and keep EMILIE bulb on suction.  Flush drain with 10cc of normal saline daily.  Ambulate as tolerated, but no heavy lifting (>10lbs) or strenuous exercise.  You may continue a low residue diet.  You should be urinating at least 3-4x per day.  Call the office if you experience increasing pain, abdominal pain, nausea, vomiting, or temperature >101.4F.

## 2019-02-04 NOTE — DISCHARGE NOTE ADULT - CARE PLAN
Principal Discharge DX:	Diverticulitis of intestine with perforation and abscess  Goal:	Recovery  Assessment and plan of treatment:	Please follow up with Dr. Man in 1 week.  Call the office to schedule your appointment.  You may shower using soap and water over drain site.  Do not scrub drain site and pat dry when done.  No tub bathing or swimming until confirmation during your follow up appointment.  Keep incision sites out of the sun, as scars will darken.  Empty drain regularly and keep EMILIE bulb on suction.  Flush drain with 10cc of normal saline daily.  Ambulate as tolerated, but no heavy lifting (>10lbs) or strenuous exercise.  You may continue a low residue diet.  You should be urinating at least 3-4x per day.  Call the office if you experience increasing pain, abdominal pain, nausea, vomiting, or temperature >101.4F. Principal Discharge DX:	Diverticulitis of intestine with perforation and abscess  Goal:	Recovery  Assessment and plan of treatment:	Please follow up with Dr. Man in 1 week.  Call the office to schedule your appointment.  You may shower using soap and water over drain site.  Do not scrub drain site and pat dry when done.  No tub bathing or swimming until confirmation during your follow up appointment. Empty drain regularly and keep EMILIE bulb on suction.  Flush drain with 10cc of normal saline daily.  Ambulate as tolerated, but no heavy lifting (>10lbs) or strenuous exercise.  You may continue a low residue diet.  You should be urinating at least 3-4x per day.  Call the office if you experience increasing pain, abdominal pain, nausea, vomiting, or temperature >101.4F. Principal Discharge DX:	Diverticulitis of intestine with perforation and abscess  Goal:	Recovery  Assessment and plan of treatment:	Please follow up with Dr. Man on tuesday 2/12.  Call the office to schedule your appointment.  You may shower using soap and water over drain site.  Do not scrub drain site and pat dry when done.  No tub bathing or swimming until confirmation during your follow up appointment. Empty drain regularly and keep EMILIE bulb on suction.  Flush drain with 10cc of normal saline daily.  Ambulate as tolerated, but no heavy lifting (>10lbs) or strenuous exercise.  You may continue a low residue diet.  You should be urinating at least 3-4x per day.  Call the office if you experience increasing pain, abdominal pain, nausea, vomiting, or temperature >101.4F.

## 2019-02-05 VITALS
TEMPERATURE: 97 F | OXYGEN SATURATION: 100 % | RESPIRATION RATE: 17 BRPM | DIASTOLIC BLOOD PRESSURE: 60 MMHG | HEIGHT: 65 IN | SYSTOLIC BLOOD PRESSURE: 115 MMHG | HEART RATE: 69 BPM

## 2019-02-05 LAB
ANION GAP SERPL CALC-SCNC: 10 MMOL/L — SIGNIFICANT CHANGE UP (ref 5–17)
BUN SERPL-MCNC: 5 MG/DL — LOW (ref 7–23)
CALCIUM SERPL-MCNC: 9.1 MG/DL — SIGNIFICANT CHANGE UP (ref 8.4–10.5)
CHLORIDE SERPL-SCNC: 102 MMOL/L — SIGNIFICANT CHANGE UP (ref 96–108)
CO2 SERPL-SCNC: 28 MMOL/L — SIGNIFICANT CHANGE UP (ref 22–31)
CREAT SERPL-MCNC: 0.55 MG/DL — SIGNIFICANT CHANGE UP (ref 0.5–1.3)
CULTURE RESULTS: SIGNIFICANT CHANGE UP
GLUCOSE SERPL-MCNC: 109 MG/DL — HIGH (ref 70–99)
HCT VFR BLD CALC: 31.4 % — LOW (ref 34.5–45)
HGB BLD-MCNC: 9.7 G/DL — LOW (ref 11.5–15.5)
MAGNESIUM SERPL-MCNC: 2 MG/DL — SIGNIFICANT CHANGE UP (ref 1.6–2.6)
MCHC RBC-ENTMCNC: 27.6 PG — SIGNIFICANT CHANGE UP (ref 27–34)
MCHC RBC-ENTMCNC: 30.9 G/DL — LOW (ref 32–36)
MCV RBC AUTO: 89.5 FL — SIGNIFICANT CHANGE UP (ref 80–100)
ORGANISM # SPEC MICROSCOPIC CNT: SIGNIFICANT CHANGE UP
PHOSPHATE SERPL-MCNC: 4.2 MG/DL — SIGNIFICANT CHANGE UP (ref 2.5–4.5)
PLATELET # BLD AUTO: 633 K/UL — HIGH (ref 150–400)
POTASSIUM SERPL-MCNC: 4.2 MMOL/L — SIGNIFICANT CHANGE UP (ref 3.5–5.3)
POTASSIUM SERPL-SCNC: 4.2 MMOL/L — SIGNIFICANT CHANGE UP (ref 3.5–5.3)
RBC # BLD: 3.51 M/UL — LOW (ref 3.8–5.2)
RBC # FLD: 14.9 % — SIGNIFICANT CHANGE UP (ref 10.3–16.9)
SODIUM SERPL-SCNC: 140 MMOL/L — SIGNIFICANT CHANGE UP (ref 135–145)
SPECIMEN SOURCE: SIGNIFICANT CHANGE UP
WBC # BLD: 10.6 K/UL — HIGH (ref 3.8–10.5)
WBC # FLD AUTO: 10.6 K/UL — HIGH (ref 3.8–10.5)

## 2019-02-05 PROCEDURE — 85730 THROMBOPLASTIN TIME PARTIAL: CPT

## 2019-02-05 PROCEDURE — C1769: CPT

## 2019-02-05 PROCEDURE — 87040 BLOOD CULTURE FOR BACTERIA: CPT

## 2019-02-05 PROCEDURE — 85610 PROTHROMBIN TIME: CPT

## 2019-02-05 PROCEDURE — 80053 COMPREHEN METABOLIC PANEL: CPT

## 2019-02-05 PROCEDURE — 74177 CT ABD & PELVIS W/CONTRAST: CPT

## 2019-02-05 PROCEDURE — C1729: CPT

## 2019-02-05 PROCEDURE — 87070 CULTURE OTHR SPECIMN AEROBIC: CPT

## 2019-02-05 PROCEDURE — 86900 BLOOD TYPING SEROLOGIC ABO: CPT

## 2019-02-05 PROCEDURE — 99285 EMERGENCY DEPT VISIT HI MDM: CPT | Mod: 25

## 2019-02-05 PROCEDURE — 83690 ASSAY OF LIPASE: CPT

## 2019-02-05 PROCEDURE — 96375 TX/PRO/DX INJ NEW DRUG ADDON: CPT

## 2019-02-05 PROCEDURE — 96365 THER/PROPH/DIAG IV INF INIT: CPT | Mod: XU

## 2019-02-05 PROCEDURE — 86850 RBC ANTIBODY SCREEN: CPT

## 2019-02-05 PROCEDURE — 83605 ASSAY OF LACTIC ACID: CPT

## 2019-02-05 PROCEDURE — 49406 IMAGE CATH FLUID PERI/RETRO: CPT

## 2019-02-05 PROCEDURE — 85025 COMPLETE CBC W/AUTO DIFF WBC: CPT

## 2019-02-05 PROCEDURE — 83735 ASSAY OF MAGNESIUM: CPT

## 2019-02-05 PROCEDURE — 86901 BLOOD TYPING SEROLOGIC RH(D): CPT

## 2019-02-05 PROCEDURE — 80048 BASIC METABOLIC PNL TOTAL CA: CPT

## 2019-02-05 PROCEDURE — 87186 SC STD MICRODIL/AGAR DIL: CPT

## 2019-02-05 PROCEDURE — 36415 COLL VENOUS BLD VENIPUNCTURE: CPT

## 2019-02-05 PROCEDURE — 81001 URINALYSIS AUTO W/SCOPE: CPT

## 2019-02-05 PROCEDURE — 84100 ASSAY OF PHOSPHORUS: CPT

## 2019-02-05 PROCEDURE — 85027 COMPLETE CBC AUTOMATED: CPT

## 2019-02-05 PROCEDURE — 87205 SMEAR GRAM STAIN: CPT

## 2019-02-05 PROCEDURE — 87075 CULTR BACTERIA EXCEPT BLOOD: CPT

## 2019-02-05 RX ORDER — MOXIFLOXACIN HYDROCHLORIDE TABLETS, 400 MG 400 MG/1
1 TABLET, FILM COATED ORAL
Qty: 14 | Refills: 0 | OUTPATIENT
Start: 2019-02-05 | End: 2019-02-11

## 2019-02-05 RX ORDER — METRONIDAZOLE 500 MG
1 TABLET ORAL
Qty: 21 | Refills: 0 | OUTPATIENT
Start: 2019-02-05 | End: 2019-02-11

## 2019-02-05 RX ADMIN — OXYCODONE HYDROCHLORIDE 5 MILLIGRAM(S): 5 TABLET ORAL at 08:00

## 2019-02-05 RX ADMIN — OXYCODONE HYDROCHLORIDE 5 MILLIGRAM(S): 5 TABLET ORAL at 01:00

## 2019-02-05 RX ADMIN — HEPARIN SODIUM 5000 UNIT(S): 5000 INJECTION INTRAVENOUS; SUBCUTANEOUS at 05:41

## 2019-02-05 RX ADMIN — OXYCODONE HYDROCHLORIDE 5 MILLIGRAM(S): 5 TABLET ORAL at 07:00

## 2019-02-05 RX ADMIN — Medication 500 MILLIGRAM(S): at 05:41

## 2019-02-05 RX ADMIN — OXYCODONE HYDROCHLORIDE 5 MILLIGRAM(S): 5 TABLET ORAL at 02:00

## 2019-02-05 NOTE — PROGRESS NOTE ADULT - SUBJECTIVE AND OBJECTIVE BOX
ID: [id number] admitted with perforated diverticulitis w/ feculent peritonitis s/p IR drainage x2 (1/28)    24 Hour Events: CT A/P showed improving size of collections w/o obvious source of leukocytosis    SUBJECTIVE: +F/+BM, tolerating LFD, ambulating      OBJECTIVE:    Vital Signs:  Vital Signs Last 24 Hrs  T(C): 36.7 (05 Feb 2019 05:18), Max: 36.9 (04 Feb 2019 20:59)  T(F): 98.1 (05 Feb 2019 05:18), Max: 98.5 (04 Feb 2019 20:59)  HR: 98 (04 Feb 2019 20:59) (98 - 120)  BP: 96/69 (05 Feb 2019 05:18) (96/69 - 116/79)  BP(mean): --  RR: 17 (05 Feb 2019 05:18) (16 - 18)  SpO2: 96% (05 Feb 2019 05:18) (96% - 98%)    Physical Exam:  General: NAD  Pulmonary: Nonlabored breathing, no respiratory distress  Cardiovascular: No heaves/thrills  Abdominal: Soft, NT/ND, drain with dark green feculent output  Extremities: WWP, no clubbing/cyanosis/edema  Neuro: AAO x3, no focal deficits    Lines/Drains/Tubes:    Ins and Outs:  I&O's Summary    04 Feb 2019 07:01  -  05 Feb 2019 07:00  --------------------------------------------------------  IN: 0 mL / OUT: 47.5 mL / NET: -47.5 mL        Labs:                        10.8   14.1  )-----------( 872      ( 04 Feb 2019 06:14 )             35.1     02-04    137  |  101  |  5<L>  ----------------------------<  114<H>  4.3   |  28  |  0.63    Ca    9.2      04 Feb 2019 06:14  Phos  4.1     02-04  Mg     2.1     02-04          CAPILLARY BLOOD GLUCOSE            Radiology & Additional Studies:

## 2019-02-05 NOTE — PROGRESS NOTE ADULT - ASSESSMENT
54 yo F w/ no PMHx and PSHx of sinus surgery, presenting from [facility] w/ perforated diverticulitis w/ feculent peritonitis s/p IR drainage x2    - NPO/IVF  - Zosyn  - JPx2  - SCDs, HSubQ, IS, OOBA  - AM labs
52 yo F w/ no PMHx and PSHx of sinus surgery, presenting from [facility] w/ perforated diverticulitis w/ feculent peritonitis s/p IR drainage x2    - NPO/IVF  - Zosyn  - JPx2  - SCDs, HSubQ, IS, OOBA  - AM labs  - CT abd/pelvis PO & IV
52 yo F w/ no PMHx and PSHx of sinus surgery, presenting from [facility] w/ perforated diverticulitis w/ feculent peritonitis s/p IR drainage x2    - LRD/IVF  - Abdominal fluid grew E. coli and Klebsiella sensitive to ciprofloxacin (2/02--), s/p zosyn  - Will d/c RUQ drain today  - SCDs, HSubQ, IS, OOBA  - AM labs
52 yo F w/ no PMHx and PSHx of sinus surgery, presenting from [facility] w/ perforated diverticulitis w/ feculent peritonitis s/p IR drainage x2    - NPO/IVF  - Zosyn  - JPx2  - SCDs, HSubQ, IS, OOBA  - AM labs
52 yo woman s/p CT guided drains placed for perforated diverticulitis, intra-abdominal abscesses on 1/28/19, seen during rounds this AM.      Flushed easily with NS    Reconsider re-imaging when output is less than 10ml/24hours for several days + clinical improvement.    christopher contact IR resident with any questions or concerns
53F admitted with perforated diverticulitis w/ feculent peritonitis s/p IR drainage x2 (1/28). One drain removed 2/3    pain/nausea control  IS/OOB  LRD, IVF  Ciprofloxacin/flagyl (2/2), s/p Zosyn  SCDs, HSQ
54 yo F s/p CT guided drains placed for perforated diveritculitis. RUQ drain removed by primary and RLQ drain was flushed 3 cc.   Will continue to follow.
54 yo F s/p CT guided drains placed for perforated diverticulitis. RUQ drain removed during admission. RLQ drain was flushed. IR will follow.
54 yo F w/ no PMHx and PSHx of sinus surgery, presenting from [facility] w/ perforated diverticulitis w/ feculent peritonitis s/p IR drainage x2    - CLD/IVF  - Zosyn  - JPx2  - SCDs, HSubQ, IS, OOBA  - AM labs  - Continue to monitor EMILIE drain
A/P: 53F admitted with perforated diverticulitis w/ feculent peritonitis s/p IR drainage x2 (1/28). Will monitor for signs/symptoms of infection with addition of flagyl.    pain/nausea control  IS/OOB  LRD  Ciprofloxacin (2/2 - ) for E. coli and Klebsiella in abscess culture 1/29, Flagyl (2/04- ) for anaerobes in abscess culture 1/29, s/p Zosyn  SCDs, HSQ
A/P: 54 yo F w/ no PMHx and PSHx of sinus surgery, presenting from [facility] w/ perforated diverticulitis w/ feculent peritonitis s/p IR drainage x2. Will monitor for signs/symptoms of infection with switch from zosyn to ciprofloxacin.    - FLD/IVF  - Ciprofloxacin, s/p zosyn  - JPx2  - SCDs, HSubQ, IS, OOBA  - AM labs
As above.

## 2019-02-05 NOTE — PROGRESS NOTE ADULT - PROVIDER SPECIALTY LIST ADULT
Intervent Radiology
Surgery

## 2019-02-05 NOTE — PROGRESS NOTE ADULT - REASON FOR ADMISSION
Perforated Diverticulitis

## 2019-02-07 DIAGNOSIS — K57.20 DIVERTICULITIS OF LARGE INTESTINE WITH PERFORATION AND ABSCESS WITHOUT BLEEDING: ICD-10-CM

## 2019-02-07 DIAGNOSIS — B96.20 UNSPECIFIED ESCHERICHIA COLI [E. COLI] AS THE CAUSE OF DISEASES CLASSIFIED ELSEWHERE: ICD-10-CM

## 2019-02-07 DIAGNOSIS — E44.0 MODERATE PROTEIN-CALORIE MALNUTRITION: ICD-10-CM

## 2019-02-07 DIAGNOSIS — K65.8 OTHER PERITONITIS: ICD-10-CM

## 2019-02-07 DIAGNOSIS — B96.1 KLEBSIELLA PNEUMONIAE [K. PNEUMONIAE] AS THE CAUSE OF DISEASES CLASSIFIED ELSEWHERE: ICD-10-CM

## 2019-02-08 ENCOUNTER — APPOINTMENT (OUTPATIENT)
Dept: COLORECTAL SURGERY | Facility: CLINIC | Age: 54
End: 2019-02-08
Payer: COMMERCIAL

## 2019-02-08 VITALS
HEIGHT: 65 IN | DIASTOLIC BLOOD PRESSURE: 80 MMHG | TEMPERATURE: 98 F | BODY MASS INDEX: 18.33 KG/M2 | WEIGHT: 110 LBS | HEART RATE: 132 BPM | SYSTOLIC BLOOD PRESSURE: 109 MMHG

## 2019-02-08 PROCEDURE — 99204 OFFICE O/P NEW MOD 45 MIN: CPT

## 2019-02-14 ENCOUNTER — EMERGENCY (EMERGENCY)
Facility: HOSPITAL | Age: 54
LOS: 1 days | Discharge: HOME | End: 2019-02-14
Attending: EMERGENCY MEDICINE | Admitting: EMERGENCY MEDICINE
Payer: COMMERCIAL

## 2019-02-14 VITALS
HEART RATE: 97 BPM | RESPIRATION RATE: 18 BRPM | DIASTOLIC BLOOD PRESSURE: 63 MMHG | OXYGEN SATURATION: 98 % | SYSTOLIC BLOOD PRESSURE: 127 MMHG

## 2019-02-14 VITALS
DIASTOLIC BLOOD PRESSURE: 73 MMHG | RESPIRATION RATE: 17 BRPM | OXYGEN SATURATION: 98 % | TEMPERATURE: 98 F | HEART RATE: 110 BPM | SYSTOLIC BLOOD PRESSURE: 106 MMHG

## 2019-02-14 DIAGNOSIS — Z98.890 OTHER SPECIFIED POSTPROCEDURAL STATES: Chronic | ICD-10-CM

## 2019-02-14 PROCEDURE — 99283 EMERGENCY DEPT VISIT LOW MDM: CPT

## 2019-02-14 NOTE — ED ADULT TRIAGE NOTE - CHIEF COMPLAINT QUOTE
diagnosed with diverticulitis here few weeks ago. pt c/o mild discomfort to surgical site, unsure if drain is leaking.

## 2019-02-14 NOTE — ED PROVIDER NOTE - OBJECTIVE STATEMENT
54 y/o female with PMHx of recent diagnosis perforated diverticulitis with feculent peritonitis here on Jan. 28th, 2019, s/p 2 drains placed by IR on Jan. 28th, now with only 1 drain in place, presents to ED c/o abd pain. Patient reports abdominal discomfort to the surgical site on the right abd where the drain was placed, states she feels as if "the bandage is coming loose" causing her discomfort. States that the drain has been draining and reports that she cleans it, denies any other symptoms. Patient has been on Cipro and Metronidazole since surgery, but does not take any probiotics. Has been f/u with her docs, last visit to surgeon last Friday.

## 2019-02-14 NOTE — ED PROVIDER NOTE - GASTROINTESTINAL, MLM
Abdomen soft, non-tender, no guarding. Abdomen soft, non-tender, no guarding. Wound and drain sites intact. Re-dressed

## 2019-02-14 NOTE — ED PROVIDER NOTE - CLINICAL SUMMARY MEDICAL DECISION MAKING FREE TEXT BOX
Patient presenting with concern that her EMILIE drain felt funny- seems intact. Re-dressed. No apparent acute issues.

## 2019-02-14 NOTE — HISTORY OF PRESENT ILLNESS
[FreeTextEntry1] : 52 yo F presents with aunt for evaluation diverticulitis, recently discharged from [facility] 2/5/19 for abscess and perforation\par \par hx of sharp pain in RLQ and frequent BMs, was going few times a day with loose stool\par Denies hx of fevers, n/v or abdominal bloating. Denies previous diverticulitis episodes\par \par 12/2018 seen at [facility], given IV antibiotics, discharged w/ Levofloxacin and other oral antibiotics x 10 days. Denies improvement, however did not follow up with any providers. Continued to have RLQ abdominal pain and loose stools\par 1/28 admitted to [facility], 2 drains placed, currently with 1 drain, discharged 2/5/19, currently on cipro/flagyl day 3, will complete 7 day treatment\par Presently with achy pain in RLQ, decreased appetite. Currently following low fiber diet\par Reports drain "looks like split pea soup," denies mucous or pus drainage\par Denies [facility] colorectal conditions or cancer\par Has never had a colonoscopy

## 2019-02-14 NOTE — PHYSICAL EXAM
[No HSM] : no hepatosplenomegaly [Abdomen Masses] : No abdominal masses [Abdomen Tenderness] : ~T No ~M abdominal tenderness [de-identified] : Right lower quadrant interventional radiology drain in place. Draining feculent material.

## 2019-02-14 NOTE — ASSESSMENT
[FreeTextEntry1] : Complex diverticulitis with fistula./Abscess.\par \par Recent CT scan from February 4, 2019 revealed improvement in abscesses. \par \par Advised continuing additional week of antibiotics for persistent residual abscess treatment. I have outlined to her the need for continued intervention reality drainage of her underlying sigmoid diverticular/cutaneous fistula.  I reviewed with the patient and her aunt the role of definitive resection for the treatment of her complex diverticulitis. I anticipate surgery in March of 2019. We will repeat CAT scan in 3 weeks for repeat assessment of the resolution of these abscesses.\par \par I discussed with the patient the plan for definitive sigmoid resection may include a temporary colostomy/ileostomy creation. The risks, benefits alternatives and the treatment plan have been outlined and reviewed thoroughly discussed. All questions answered

## 2019-02-14 NOTE — CONSULT LETTER
[Dear  ___] : Dear  [unfilled], [Consult Letter:] : I had the pleasure of evaluating your patient, [unfilled]. [( Thank you for referring [unfilled] for consultation for _____ )] : Thank you for referring [unfilled] for consultation for [unfilled] [Please see my note below.] : Please see my note below. [Consult Closing:] : Thank you very much for allowing me to participate in the care of this patient.  If you have any questions, please do not hesitate to contact me. [Sincerely,] : Sincerely, [FreeTextEntry2] : JEANNETTE ADKINS\par [address]\par [place], [place] [FreeTextEntry3] : SANDRA ARANGO MD

## 2019-02-18 DIAGNOSIS — K91.89 OTHER POSTPROCEDURAL COMPLICATIONS AND DISORDERS OF DIGESTIVE SYSTEM: ICD-10-CM

## 2019-02-18 DIAGNOSIS — R10.9 UNSPECIFIED ABDOMINAL PAIN: ICD-10-CM

## 2019-02-18 DIAGNOSIS — Z79.2 LONG TERM (CURRENT) USE OF ANTIBIOTICS: ICD-10-CM

## 2019-02-22 ENCOUNTER — OUTPATIENT (OUTPATIENT)
Dept: OUTPATIENT SERVICES | Facility: HOSPITAL | Age: 54
LOS: 1 days | End: 2019-02-22

## 2019-02-22 ENCOUNTER — APPOINTMENT (OUTPATIENT)
Dept: CT IMAGING | Facility: CLINIC | Age: 54
End: 2019-02-22
Payer: COMMERCIAL

## 2019-02-22 DIAGNOSIS — Z98.890 OTHER SPECIFIED POSTPROCEDURAL STATES: Chronic | ICD-10-CM

## 2019-02-22 PROBLEM — K65.9 PERITONITIS, UNSPECIFIED: Chronic | Status: ACTIVE | Noted: 2019-02-14

## 2019-02-22 PROCEDURE — 74177 CT ABD & PELVIS W/CONTRAST: CPT | Mod: 26

## 2019-02-23 ENCOUNTER — EMERGENCY (EMERGENCY)
Facility: HOSPITAL | Age: 54
LOS: 1 days | Discharge: HOME | End: 2019-02-23
Attending: EMERGENCY MEDICINE | Admitting: EMERGENCY MEDICINE
Payer: COMMERCIAL

## 2019-02-23 VITALS — OXYGEN SATURATION: 97 % | RESPIRATION RATE: 16 BRPM | HEART RATE: 104 BPM

## 2019-02-23 VITALS
RESPIRATION RATE: 18 BRPM | SYSTOLIC BLOOD PRESSURE: 111 MMHG | OXYGEN SATURATION: 99 % | TEMPERATURE: 98 F | HEART RATE: 110 BPM | DIASTOLIC BLOOD PRESSURE: 69 MMHG

## 2019-02-23 DIAGNOSIS — Z98.890 OTHER SPECIFIED POSTPROCEDURAL STATES: Chronic | ICD-10-CM

## 2019-02-23 PROCEDURE — 99282 EMERGENCY DEPT VISIT SF MDM: CPT

## 2019-02-23 NOTE — ED PROVIDER NOTE - CLINICAL SUMMARY MEDICAL DECISION MAKING FREE TEXT BOX
Patient presenting with concern for irritated EMILIE drain- looks intact. Dressing changed. Given more supplies for wound care. Had a fu out patient CT yesterday and is followed by surgery.

## 2019-02-23 NOTE — ED ADULT TRIAGE NOTE - NS ED TRIAGE HISTORIAN
LVM for patient to return call at earliest convenience. Per NP Jenniffer:    Got labs from PCP  Cholesterol is very high, will decide what to do after cardiac testing is complete  Vitamin D level was low, please advise her to begin Vitamin D 50,000 units once/week x 8 weeks and then take 2000 units daily after that    Patient returned call, 2 pt identifiers used  Above message given to patient. Requested Prescriptions     Signed Prescriptions Disp Refills    ergocalciferol (ERGOCALCIFEROL) 50,000 unit capsule 4 Cap 1     Sig: Take 1 Cap by mouth every seven (7) days for 56 days.      Authorizing Provider: Jeanine Jimenez     Ordering User: Michelle Liner     Per verbal orders        Future Appointments   Date Time Provider Mark Renner   3/8/2019  3:00 PM AYDE, [address] [place]   3/8/2019  3:00  Lj Concepcion, [address] Patient

## 2019-02-23 NOTE — ED PROVIDER NOTE - NSFOLLOWUPINSTRUCTIONS_ED_ALL_ED_FT
Please keep dressing intact.   Follow up with your surgeon as planned.  Return for any concerns for increased pain.

## 2019-02-23 NOTE — ED PROVIDER NOTE - GASTROINTESTINAL, MLM
Small amount of exudate from wound consistent with visit. No new wound, erythema, mass, or collection. Drain appears to be draining well.

## 2019-02-23 NOTE — ED PROVIDER NOTE - OBJECTIVE STATEMENT
53y F with PMHx presents to ED for medical evaluation. ELLIE drain in her right lower abdomen with a low level of continuous output of purulent fecal material following an admission to [facility] for perforated diverticulitis with fecal contents in multiple quadrants of her abd. seen here 8 d ago for slight discomfort to ellie drain area. Site was intact.  output was normal. 54 y/o female with PMHx of recent diagnosis perforated diverticulitis with feculent peritonitis here on Jan. 28th, 2019, s/p 2 drains placed by IR on Jan. 28th, now with only 1 drain in place, here for evaluation of her EMILIE drain. Pt has EMILIE drain in her right lower abdomen with a low level of continuous output of purulent fecal material following an admission to [facility] for perforated diverticulitis with fecal contents in multiple quadrants of her abd. Seen here 8 d ago for slight discomfort to EMILIE drain area. Site was intact. Drain output was normal. Pt states she was at the gym today and adjusted her drain. She states she tugged it too hard and tried to fix it, with no success. Denies pain, fever/chills, n/v/d, rash, back pain, urinary symptoms.

## 2019-02-23 NOTE — ED ADULT TRIAGE NOTE - CHIEF COMPLAINT QUOTE
Pt has hx of diverticulitis, had surgery, with drains in place. Concerned that surgical site looks irritated and painful.

## 2019-02-27 DIAGNOSIS — Z79.2 LONG TERM (CURRENT) USE OF ANTIBIOTICS: ICD-10-CM

## 2019-02-27 DIAGNOSIS — K57.80 DIVERTICULITIS OF INTESTINE, PART UNSPECIFIED, WITH PERFORATION AND ABSCESS WITHOUT BLEEDING: ICD-10-CM

## 2019-03-01 ENCOUNTER — EMERGENCY (EMERGENCY)
Facility: HOSPITAL | Age: 54
LOS: 1 days | Discharge: HOME | End: 2019-03-01
Admitting: EMERGENCY MEDICINE
Payer: COMMERCIAL

## 2019-03-01 ENCOUNTER — APPOINTMENT (OUTPATIENT)
Dept: COLORECTAL SURGERY | Facility: CLINIC | Age: 54
End: 2019-03-01
Payer: COMMERCIAL

## 2019-03-01 VITALS
DIASTOLIC BLOOD PRESSURE: 77 MMHG | HEART RATE: 108 BPM | WEIGHT: 115.13 LBS | TEMPERATURE: 98.5 F | SYSTOLIC BLOOD PRESSURE: 113 MMHG | BODY MASS INDEX: 19.18 KG/M2 | HEIGHT: 65 IN

## 2019-03-01 VITALS
OXYGEN SATURATION: 97 % | DIASTOLIC BLOOD PRESSURE: 78 MMHG | RESPIRATION RATE: 18 BRPM | HEART RATE: 99 BPM | TEMPERATURE: 98 F | SYSTOLIC BLOOD PRESSURE: 117 MMHG

## 2019-03-01 DIAGNOSIS — Z98.890 OTHER SPECIFIED POSTPROCEDURAL STATES: Chronic | ICD-10-CM

## 2019-03-01 PROCEDURE — 99282 EMERGENCY DEPT VISIT SF MDM: CPT | Mod: 25

## 2019-03-01 PROCEDURE — 99214 OFFICE O/P EST MOD 30 MIN: CPT

## 2019-03-01 RX ORDER — METRONIDAZOLE 500 MG/1
500 TABLET, FILM COATED ORAL
Refills: 0 | Status: DISCONTINUED | COMMUNITY
End: 2019-03-01

## 2019-03-01 RX ORDER — CIPROFLOXACIN HYDROCHLORIDE 500 MG/1
500 TABLET, FILM COATED ORAL
Qty: 14 | Refills: 0 | Status: DISCONTINUED | COMMUNITY
Start: 2019-02-08 | End: 2019-03-01

## 2019-03-01 RX ORDER — CIPROFLOXACIN HYDROCHLORIDE 500 MG/1
500 TABLET, FILM COATED ORAL
Refills: 0 | Status: DISCONTINUED | COMMUNITY
End: 2019-03-01

## 2019-03-01 RX ORDER — METRONIDAZOLE 500 MG/1
500 TABLET ORAL 3 TIMES DAILY
Qty: 21 | Refills: 0 | Status: DISCONTINUED | COMMUNITY
Start: 2019-02-08 | End: 2019-03-01

## 2019-03-01 NOTE — ED ADULT NURSE NOTE - CHIEF COMPLAINT QUOTE
Pt diagnosed with perforated diverticulitis a few weeks ago, had surgery with drains placed. Pt would like to have dressing  to site changed. Pt denies fever, chills, and pain at site.

## 2019-03-01 NOTE — PHYSICAL EXAM
[Abdomen Masses] : No abdominal masses [Abdomen Tenderness] : ~T No ~M abdominal tenderness [No HSM] : no hepatosplenomegaly [de-identified] : Right lower quadrant interventional radiology drain in place. Draining feculent material.

## 2019-03-01 NOTE — ED PROVIDER NOTE - CLINICAL SUMMARY MEDICAL DECISION MAKING FREE TEXT BOX
here for dressing change. EMILIE drain ooks intact. Dressing changed. Given more supplies for wound care. Had a fu out patient today with surgery

## 2019-03-01 NOTE — ED ADULT TRIAGE NOTE - CHIEF COMPLAINT QUOTE
Pt diagnosed with diverticulitis a few weeks ago,  has surgery with drains placed. Pt would like to have bandage to site changed. Pt denies fever, chills, and pain at site. Pt diagnosed with diverticulitis a few weeks ago, had surgery with drains placed. Pt would like to have dressing  to site changed. Pt denies fever, chills, and pain at site. Pt diagnosed with perforated diverticulitis a few weeks ago, had surgery with drains placed. Pt would like to have dressing  to site changed. Pt denies fever, chills, and pain at site.

## 2019-03-01 NOTE — ED PROVIDER NOTE - OBJECTIVE STATEMENT
54 y/o female with PMHx of recent diagnosis perforated diverticulitis with feculent peritonitis here on Jan. 28th, 2019, s/p 2 drains placed by IR on Jan. 28th, now with only 1 drain in place, here for evaluation of her EMILIE drain. Pt has EMILIE drain in her right lower abdomen with a low level of continuous output of purulent fecal material following an admission to [facility] for perforated diverticulitis with fecal contents in multiple quadrants of her abd. Seen hereon 2/14 and 2/23 for slight discomfort to EMILIE drain area. Site was intact. Drain output was normal. has appointment with surgeon today at 12pm, here for dressing change. Denies pain, fever/chills, n/v/d, rash, back pain, urinary

## 2019-03-01 NOTE — ASSESSMENT
[FreeTextEntry1] : I reviewed with the patient my concerns regarding inadequate drainage of her perforated diverticulitis with the current interventional radiology drainage procedure. She will return to interventional radiology for repeat assessment of possible upsizing of the drain versus secondary drain placement. However, if persistent and increasing inflammation is noted on CT scanning, I have discussed with her and her aunt with a roll and need for definitive sigmoid resection and end colostomy (Dameon procedure).\par \par

## 2019-03-01 NOTE — ED PROVIDER NOTE - PHYSICAL EXAMINATION
VITAL SIGNS: I have reviewed nursing notes and confirm.  CONSTITUTIONAL: Well-developed; well-nourished; in no acute distress.  SKIN: Skin is warm and dry, no acute rash.  HEAD: Normocephalic; atraumatic.  EYES: PERRL, EOM intact; conjunctiva and sclera clear.  ENT: No nasal discharge; airway clear.  NECK: Supple; non tender.  CARD: S1, S2 normal; no murmurs, gallops, or rubs. Regular rate and rhythm.  RESP: No wheezes, rales or rhonchi.  ABD: Small amount of exudate from wound consistent with visit. No new wound, erythema, mass, or collection. Drain appears to be draining well. Normal bowel sounds; soft; non-distended; non-tender;  no palpable or pulsating mass; no hepatosplenomegaly.  EXT: Normal ROM. No clubbing, cyanosis or edema.  NEURO: Alert, oriented. Grossly unremarkable.  PSYCH: Cooperative, appropriate.

## 2019-03-01 NOTE — ED PROVIDER NOTE - NS ED ROS FT
· CONSTITUTIONAL: no fever and no chills.  · CARDIOVASCULAR: normal rate and rhythm, no chest pain and no edema.  · RESPIRATORY: no chest pain, no cough, and no shortness of breath.  · GASTROINTESTINAL: no abdominal pain, no bloating, no constipation, no diarrhea, no nausea and no vomiting.  · MUSCULOSKELETAL: no back pain, no musculoskeletal pain, no neck pain, and no weakness.  · SKIN: +dressing change, EMILIE drain, no abrasions, no jaundice, no lesions, no pruritis, and no rashes.  · NEURO: no loss of consciousness, no gait abnormality, no headache, no sensory deficits, and no weakness.  · PSYCHIATRIC: no known mental health issues.

## 2019-03-01 NOTE — HISTORY OF PRESENT ILLNESS
[FreeTextEntry1] : 52 yo F w/ complicated diverticulitis presents for follow up\par hx of abscess and perforation, s/p hospitalization and drain placement for pelvic abscess, 1 drain in place\par Discharged 2/5/2019\par Has been flushing drain w/o difficulty, however finds applying adhesive difficult and has been going to [facility] for nursing to perform\par Annoyed by drain as it interferes w/ her exercise ([place] and spin), still feels tired, but feels it may be 'psychosomatic)\par Occasionally feels sharp twinge of pain when walking, however reports has been able to walk more often\par Denies fever, pain, n/v or rash.  Appetite good.\par \par EXAM: CT ABDOMEN AND PELVIS OC IC \par PROCEDURE DATE: 02/22/2019 \par \par INTERPRETATION: Clinical information: Follow-up diverticulitis with abscess \par postdrainage \par \par Comparison: 2/4 \par \par PROCEDURE: \par CT of the Abdomen and Pelvis was performed with intravenous contrast. \par 90ml of Omnipaque 350 was injected intravenously. 10cc was discarded. \par \par FINDINGS: \par \par LOWER CHEST: Within normal limits \par \par ABDOMEN: \par LIVER: Subcentimeter hypodensities. \par BILE DUCTS: Normal caliber \par GALLBLADDER: No calcified gallstones. Normal caliber wall \par PANCREAS: Within normal limits \par SPLEEN: Within normal limits \par ADRENALS: Within normal limits \par KIDNEYS: Heterogeneous enhancement? Pyelonephritis. No abscess or \par hydronephrosis. Punctate nonobstructing calculi. \par \par PELVIS: \par REPRODUCTIVE ORGANS: New air in the uterine cavity. 2 x 2.9 cm right adnexal \par rim-enhancing air-fluid collection suspicious for an abscess, new from prior. \par BLADDER: Within normal limits \par \par PERITONEUM:Overall increase inflammatory changes in the pelvis. Enlarged 4.5 \par x 4.9 cm central pelvic abscess with a drain in place with increased amount \par of air within the abscess cavity. 5.2 x 1.6 cm oblong left anterior \par paracolic collection, enlarged from prior. 2.2 x 2.1 cm inflammatory focus \par right lower quadrant stable. \par BOWEL: Increased inflammation of the sigmoid colon and surrounding \par mesentery. Tethering of the distal transverse colon to the sigmoid paracolic \par collection best on 3:92. Multiple inflamed small bowel loops in the pelvis. \par No extravasated enteric contrast. \par VESSELS: Within normal limits \par RETROPERITONEUM: 1.5 cm anterior pelvic lymph node, enlarged. \par ABDOMINAL WALL: Within normal limits \par MUSCULOSKELETAL: Within normal limits \par \par IMPRESSION: \par \par Overall worsening inflammatory changes in the sigmoid colon and pelvis. \par \par Enlarged central pelvic abscess with a drain in place. \par \par Enlarged elongated left paracolic abscess. \par \par New 2.2 cm right adnexal abscess. New air in the uterine cavity? Retrograde \par reflux from the right adnexal abscess versus enterouterine fistula. \par \par Question pyelonephritis. \par \par Other incidental findings as above. \par

## 2019-03-04 ENCOUNTER — INPATIENT (INPATIENT)
Facility: HOSPITAL | Age: 54
LOS: 7 days | Discharge: HOME HEALTH | DRG: 330 | End: 2019-03-12
Attending: SURGERY | Admitting: SURGERY
Payer: COMMERCIAL

## 2019-03-04 VITALS
OXYGEN SATURATION: 98 % | SYSTOLIC BLOOD PRESSURE: 119 MMHG | HEART RATE: 98 BPM | DIASTOLIC BLOOD PRESSURE: 70 MMHG | TEMPERATURE: 98 F | RESPIRATION RATE: 18 BRPM | WEIGHT: 120.15 LBS

## 2019-03-04 DIAGNOSIS — Z98.890 OTHER SPECIFIED POSTPROCEDURAL STATES: Chronic | ICD-10-CM

## 2019-03-04 LAB
ALBUMIN SERPL ELPH-MCNC: 2.7 G/DL — LOW (ref 3.3–5)
ALP SERPL-CCNC: 69 U/L — SIGNIFICANT CHANGE UP (ref 40–120)
ALT FLD-CCNC: 6 U/L — LOW (ref 10–45)
ANION GAP SERPL CALC-SCNC: 11 MMOL/L — SIGNIFICANT CHANGE UP (ref 5–17)
APPEARANCE UR: CLEAR — SIGNIFICANT CHANGE UP
APTT BLD: 28 SEC — SIGNIFICANT CHANGE UP (ref 27.5–36.3)
AST SERPL-CCNC: 8 U/L — LOW (ref 10–40)
BASOPHILS # BLD AUTO: 0.04 K/UL — SIGNIFICANT CHANGE UP (ref 0–0.2)
BASOPHILS # BLD AUTO: 0.05 K/UL
BASOPHILS NFR BLD AUTO: 0.2 % — SIGNIFICANT CHANGE UP (ref 0–2)
BASOPHILS NFR BLD AUTO: 0.3 %
BILIRUB SERPL-MCNC: <0.2 MG/DL — SIGNIFICANT CHANGE UP (ref 0.2–1.2)
BILIRUB UR-MCNC: NEGATIVE — SIGNIFICANT CHANGE UP
BUN SERPL-MCNC: 10 MG/DL — SIGNIFICANT CHANGE UP (ref 7–23)
CALCIUM SERPL-MCNC: 8.7 MG/DL — SIGNIFICANT CHANGE UP (ref 8.4–10.5)
CHLORIDE SERPL-SCNC: 105 MMOL/L — SIGNIFICANT CHANGE UP (ref 96–108)
CO2 SERPL-SCNC: 26 MMOL/L — SIGNIFICANT CHANGE UP (ref 22–31)
COLOR SPEC: YELLOW — SIGNIFICANT CHANGE UP
CREAT SERPL-MCNC: 0.55 MG/DL — SIGNIFICANT CHANGE UP (ref 0.5–1.3)
CRP SERPL-MCNC: 6.44 MG/DL
DIFF PNL FLD: NEGATIVE — SIGNIFICANT CHANGE UP
EOSINOPHIL # BLD AUTO: 0.16 K/UL — SIGNIFICANT CHANGE UP (ref 0–0.5)
EOSINOPHIL # BLD AUTO: 0.19 K/UL
EOSINOPHIL NFR BLD AUTO: 0.9 % — SIGNIFICANT CHANGE UP (ref 0–6)
EOSINOPHIL NFR BLD AUTO: 1.1 %
GLUCOSE SERPL-MCNC: 92 MG/DL — SIGNIFICANT CHANGE UP (ref 70–99)
GLUCOSE UR QL: NEGATIVE — SIGNIFICANT CHANGE UP
HCT VFR BLD CALC: 26.4 % — LOW (ref 34.5–45)
HCT VFR BLD CALC: 31.9 %
HGB BLD-MCNC: 8.4 G/DL — LOW (ref 11.5–15.5)
HGB BLD-MCNC: 9.5 G/DL
IMM GRANULOCYTES NFR BLD AUTO: 0.4 % — SIGNIFICANT CHANGE UP (ref 0–1.5)
IMM GRANULOCYTES NFR BLD AUTO: 0.6 %
INR BLD: 1.15 — SIGNIFICANT CHANGE UP (ref 0.88–1.16)
KETONES UR-MCNC: 15 MG/DL
LEUKOCYTE ESTERASE UR-ACNC: NEGATIVE — SIGNIFICANT CHANGE UP
LYMPHOCYTES # BLD AUTO: 1.82 K/UL
LYMPHOCYTES # BLD AUTO: 12.8 % — LOW (ref 13–44)
LYMPHOCYTES # BLD AUTO: 2.2 K/UL — SIGNIFICANT CHANGE UP (ref 1–3.3)
LYMPHOCYTES NFR BLD AUTO: 10.6 %
MAN DIFF?: NORMAL
MCHC RBC-ENTMCNC: 27.5 PG
MCHC RBC-ENTMCNC: 28.6 PG — SIGNIFICANT CHANGE UP (ref 27–34)
MCHC RBC-ENTMCNC: 29.8 GM/DL
MCHC RBC-ENTMCNC: 31.8 GM/DL — LOW (ref 32–36)
MCV RBC AUTO: 89.8 FL — SIGNIFICANT CHANGE UP (ref 80–100)
MCV RBC AUTO: 92.5 FL
MONOCYTES # BLD AUTO: 1.04 K/UL
MONOCYTES # BLD AUTO: 1.08 K/UL — HIGH (ref 0–0.9)
MONOCYTES NFR BLD AUTO: 6 %
MONOCYTES NFR BLD AUTO: 6.3 % — SIGNIFICANT CHANGE UP (ref 2–14)
NEUTROPHILS # BLD AUTO: 13.6 K/UL — HIGH (ref 1.8–7.4)
NEUTROPHILS # BLD AUTO: 13.99 K/UL
NEUTROPHILS NFR BLD AUTO: 79.4 % — HIGH (ref 43–77)
NEUTROPHILS NFR BLD AUTO: 81.4 %
NITRITE UR-MCNC: NEGATIVE — SIGNIFICANT CHANGE UP
NRBC # BLD: 0 /100 WBCS — SIGNIFICANT CHANGE UP (ref 0–0)
PH UR: 6 — SIGNIFICANT CHANGE UP (ref 5–8)
PLATELET # BLD AUTO: 531 K/UL — HIGH (ref 150–400)
PLATELET # BLD AUTO: 699 K/UL
POTASSIUM SERPL-MCNC: 3.3 MMOL/L — LOW (ref 3.5–5.3)
POTASSIUM SERPL-SCNC: 3.3 MMOL/L — LOW (ref 3.5–5.3)
PROT SERPL-MCNC: 6.7 G/DL — SIGNIFICANT CHANGE UP (ref 6–8.3)
PROT UR-MCNC: 30 MG/DL
PROTHROM AB SERPL-ACNC: 13 SEC — HIGH (ref 10–12.9)
RBC # BLD: 2.94 M/UL — LOW (ref 3.8–5.2)
RBC # BLD: 3.45 M/UL
RBC # FLD: 15.2 % — HIGH (ref 10.3–14.5)
RBC # FLD: 15.7 %
SODIUM SERPL-SCNC: 142 MMOL/L — SIGNIFICANT CHANGE UP (ref 135–145)
SP GR SPEC: >=1.03 — SIGNIFICANT CHANGE UP (ref 1–1.03)
UROBILINOGEN FLD QL: 0.2 E.U./DL — SIGNIFICANT CHANGE UP
WBC # BLD: 17.15 K/UL — HIGH (ref 3.8–10.5)
WBC # FLD AUTO: 17.15 K/UL — HIGH (ref 3.8–10.5)
WBC # FLD AUTO: 17.2 K/UL

## 2019-03-04 PROCEDURE — 99222 1ST HOSP IP/OBS MODERATE 55: CPT | Mod: GC

## 2019-03-04 PROCEDURE — 99285 EMERGENCY DEPT VISIT HI MDM: CPT

## 2019-03-04 PROCEDURE — 74177 CT ABD & PELVIS W/CONTRAST: CPT | Mod: 26

## 2019-03-04 RX ORDER — PIPERACILLIN AND TAZOBACTAM 4; .5 G/20ML; G/20ML
3.38 INJECTION, POWDER, LYOPHILIZED, FOR SOLUTION INTRAVENOUS EVERY 6 HOURS
Qty: 0 | Refills: 0 | Status: DISCONTINUED | OUTPATIENT
Start: 2019-03-04 | End: 2019-03-07

## 2019-03-04 RX ORDER — HEPARIN SODIUM 5000 [USP'U]/ML
5000 INJECTION INTRAVENOUS; SUBCUTANEOUS EVERY 8 HOURS
Qty: 0 | Refills: 0 | Status: DISCONTINUED | OUTPATIENT
Start: 2019-03-04 | End: 2019-03-07

## 2019-03-04 RX ORDER — PIPERACILLIN AND TAZOBACTAM 4; .5 G/20ML; G/20ML
3.38 INJECTION, POWDER, LYOPHILIZED, FOR SOLUTION INTRAVENOUS ONCE
Qty: 0 | Refills: 0 | Status: COMPLETED | OUTPATIENT
Start: 2019-03-04 | End: 2019-03-04

## 2019-03-04 RX ORDER — SODIUM CHLORIDE 9 MG/ML
1000 INJECTION, SOLUTION INTRAVENOUS
Qty: 0 | Refills: 0 | Status: DISCONTINUED | OUTPATIENT
Start: 2019-03-04 | End: 2019-03-07

## 2019-03-04 RX ORDER — HYDROMORPHONE HYDROCHLORIDE 2 MG/ML
0.5 INJECTION INTRAMUSCULAR; INTRAVENOUS; SUBCUTANEOUS EVERY 4 HOURS
Qty: 0 | Refills: 0 | Status: DISCONTINUED | OUTPATIENT
Start: 2019-03-04 | End: 2019-03-07

## 2019-03-04 RX ORDER — IOHEXOL 300 MG/ML
30 INJECTION, SOLUTION INTRAVENOUS ONCE
Qty: 0 | Refills: 0 | Status: COMPLETED | OUTPATIENT
Start: 2019-03-04 | End: 2019-03-04

## 2019-03-04 RX ORDER — ONDANSETRON 8 MG/1
4 TABLET, FILM COATED ORAL EVERY 6 HOURS
Qty: 0 | Refills: 0 | Status: DISCONTINUED | OUTPATIENT
Start: 2019-03-04 | End: 2019-03-07

## 2019-03-04 RX ORDER — HYDROMORPHONE HYDROCHLORIDE 2 MG/ML
1 INJECTION INTRAMUSCULAR; INTRAVENOUS; SUBCUTANEOUS EVERY 4 HOURS
Qty: 0 | Refills: 0 | Status: DISCONTINUED | OUTPATIENT
Start: 2019-03-04 | End: 2019-03-07

## 2019-03-04 RX ORDER — SODIUM CHLORIDE 9 MG/ML
1000 INJECTION INTRAMUSCULAR; INTRAVENOUS; SUBCUTANEOUS ONCE
Qty: 0 | Refills: 0 | Status: COMPLETED | OUTPATIENT
Start: 2019-03-04 | End: 2019-03-04

## 2019-03-04 RX ADMIN — IOHEXOL 30 MILLILITER(S): 300 INJECTION, SOLUTION INTRAVENOUS at 16:15

## 2019-03-04 RX ADMIN — SODIUM CHLORIDE 1500 MILLILITER(S): 9 INJECTION INTRAMUSCULAR; INTRAVENOUS; SUBCUTANEOUS at 13:28

## 2019-03-04 RX ADMIN — PIPERACILLIN AND TAZOBACTAM 200 GRAM(S): 4; .5 INJECTION, POWDER, LYOPHILIZED, FOR SOLUTION INTRAVENOUS at 15:18

## 2019-03-04 RX ADMIN — HYDROMORPHONE HYDROCHLORIDE 1 MILLIGRAM(S): 2 INJECTION INTRAMUSCULAR; INTRAVENOUS; SUBCUTANEOUS at 21:21

## 2019-03-04 RX ADMIN — HYDROMORPHONE HYDROCHLORIDE 1 MILLIGRAM(S): 2 INJECTION INTRAMUSCULAR; INTRAVENOUS; SUBCUTANEOUS at 17:18

## 2019-03-04 RX ADMIN — PIPERACILLIN AND TAZOBACTAM 200 GRAM(S): 4; .5 INJECTION, POWDER, LYOPHILIZED, FOR SOLUTION INTRAVENOUS at 21:20

## 2019-03-04 RX ADMIN — HEPARIN SODIUM 5000 UNIT(S): 5000 INJECTION INTRAVENOUS; SUBCUTANEOUS at 21:20

## 2019-03-04 RX ADMIN — HYDROMORPHONE HYDROCHLORIDE 1 MILLIGRAM(S): 2 INJECTION INTRAMUSCULAR; INTRAVENOUS; SUBCUTANEOUS at 17:08

## 2019-03-04 RX ADMIN — HYDROMORPHONE HYDROCHLORIDE 1 MILLIGRAM(S): 2 INJECTION INTRAMUSCULAR; INTRAVENOUS; SUBCUTANEOUS at 22:20

## 2019-03-04 NOTE — ED PROVIDER NOTE - CLINICAL SUMMARY MEDICAL DECISION MAKING FREE TEXT BOX
Patient with elevated wbc afebrile with h/o perforated diverticulitis and abscess. Admitted for further surgical intervention and IV abx therapy. Patient well appearing, NAD and VSS.

## 2019-03-04 NOTE — H&P ADULT - ASSESSMENT
54 yo F with interval worsening collections related to diverticulitis    Admit to [facility]. [facility]  Obtain repeat CT A/P  IR upsizing of drain  IV ABX  NPO/IVF  Pain & Nausea control PRN   Seen and examined with chief resident, discussed with attending

## 2019-03-04 NOTE — H&P ADULT - HISTORY OF PRESENT ILLNESS
52 yo F w/ recent prolonged admission for perforated diverticulitis, which required IR drainage, with drain remaining in place, and IV ABX discharged on 2/4. Patient representing today after follow up appointment on 3/1 with Surgeon. She underwent CT A/P on 2/22 with interval worsening in abdominal collections. Patient has been otherwise asymptomatic, and tolerating PO intake, without N/V.

## 2019-03-04 NOTE — ED PROVIDER NOTE - CHPI ED SYMPTOMS NEG
no abdominal distension/no vomiting/no chills/no nausea/no blood in stool/no fever/no burning urination/no diarrhea/no dysuria/no hematuria

## 2019-03-04 NOTE — H&P ADULT - NSHPPHYSICALEXAM_GEN_ALL_CORE
General: NAD  Respiratory: Normal respiratory effort  Abdominal: Soft, Drain in place, Bandage CDI  Extremities: No edema, + peripheral pulses

## 2019-03-04 NOTE — ED PROVIDER NOTE - OBJECTIVE STATEMENT
54 y/o f with h/o diverticulitis present to ED report of elevated wbc done at surgeon's office. Patient with h/o  perforated diverticulitis and pelvic abscess. + paracolic abscess was recently admitted and tx with IV abx therapy. Patient denies fever, n, v, sob, chest pain, bloody stools. + loose stools

## 2019-03-04 NOTE — H&P ADULT - NSHPLABSRESULTS_GEN_ALL_CORE
LABS/RADIOLOGY RESULTS:                          8.4    17.15 )-----------( 531      ( 04 Mar 2019 13:38 )             26.4   03-04    142  |  105  |  10  ----------------------------<  92  3.3<L>   |  26  |  0.55    Ca    8.7      04 Mar 2019 13:38    TPro  6.7  /  Alb  2.7<L>  /  TBili  <0.2  /  DBili  x   /  AST  8<L>  /  ALT  6<L>  /  AlkPhos  69  03-04  PT/INR - ( 04 Mar 2019 13:38 )   PT: 13.0 sec;   INR: 1.15          PTT - ( 04 Mar 2019 13:38 )  PTT:28.0 secBlood Cultures    Urinalysis Basic - ( 04 Mar 2019 13:37 )    Color: Yellow / Appearance: Clear / SG: >=1.030 / pH:   Gluc:  / Ketone: 15 mg/dL  / Bili: Negative / Urobili: 0.2 E.U./dL   Blood:  / Protein: 30 mg/dL / Nitrite: NEGATIVE   Leuk Esterase: NEGATIVE / RBC: < 5 /HPF / WBC < 5 /HPF   Sq Epi:  / Non Sq Epi: 0-5 /HPF / Bacteria: Present /HPF LABS/RADIOLOGY RESULTS:                          8.4    17.15 )-----------( 531      ( 04 Mar 2019 13:38 )             26.4   03-04    142  |  105  |  10  ----------------------------<  92  3.3<L>   |  26  |  0.55    Ca    8.7      04 Mar 2019 13:38    TPro  6.7  /  Alb  2.7<L>  /  TBili  <0.2  /  DBili  x   /  AST  8<L>  /  ALT  6<L>  /  AlkPhos  69  03-04  PT/INR - ( 04 Mar 2019 13:38 )   PT: 13.0 sec;   INR: 1.15          PTT - ( 04 Mar 2019 13:38 )  PTT:28.0 secBlood Cultures    Urinalysis Basic - ( 04 Mar 2019 13:37 )    Color: Yellow / Appearance: Clear / SG: >=1.030 / pH:   Gluc:  / Ketone: 15 mg/dL  / Bili: Negative / Urobili: 0.2 E.U./dL   Blood:  / Protein: 30 mg/dL / Nitrite: NEGATIVE   Leuk Esterase: NEGATIVE / RBC: < 5 /HPF / WBC < 5 /HPF   Sq Epi:  / Non Sq Epi: 0-5 /HPF / Bacteria: Present /HPF    < from: CT Abdomen and Pelvis w/ Oral Cont and w/ IV Cont (02.22.19 @ 10:14) >      PERITONEUM:Overall increase inflammatory changes inthe pelvis. Enlarged   4.5 x 4.9 cm central pelvic abscess with a drain in place with increased   amount of air within the abscess cavity. 5.2 x 1.6 cm oblong left   anterior paracolic collection, enlarged from prior. 2.2 x 2.1 cm   inflammatory focus right lower quadrant stable.   BOWEL: Increased inflammation of the sigmoid colon and surrounding   mesentery. Tethering of the distal transverse colon to the sigmoid   paracolic collection best on 3:92. Multiple inflamed small bowel loops in   the pelvis. No extravasated enteric contrast.  VESSELS: Within normal limits  RETROPERITONEUM: 1.5 cm anterior pelvic lymph node, enlarged.   ABDOMINAL WALL: Within normal limits  MUSCULOSKELETAL: Within normal limits    IMPRESSION:     Overall worsening inflammatory changes in the sigmoid colon and pelvis.    Enlarged central pelvic abscess with a drain in place.    Enlarged elongated left paracolic abscess.    New 2.2 cm right adnexal abscess. New air in the uterine cavity?   Retrograde reflux from the right adnexal abscess versus enterouterine   fistula.    Question pyelonephritis.    Other incidental findings as above.    < end of copied text >

## 2019-03-04 NOTE — ED PROVIDER NOTE - ATTENDING CONTRIBUTION TO CARE
54 yo with ho of diverticulitis, w recent admission with abscess secondary to divertic perforation, Feb 22, has 1 EMILIE drain, Dr. Ybarra surgeon  presents today as her surgeon was concerned about elevated WBC as outpt, had repeat CT one week ago, sent in for EMILIE drain adjustment by IR, will need admission for this, pt tolerating PO at home,   EXAM: pt with EMILIE drain in place with brown feculent appearing discharge, no rebound or guarding, tender at mid abdomen. non toxic appearing,   SURGERY involved and consulting, will need admission, consider repeat CT if advised by surgery, dispo plan per surgery.

## 2019-03-04 NOTE — ED ADULT NURSE NOTE - OBJECTIVE STATEMENT
Patient is a 52yo female stating, "my doctor sent me in for admission for another drain." Patient has drain for treatment of diverticulitis, as per patient. Denies any current symptoms, drain malfunction.

## 2019-03-05 DIAGNOSIS — Z48.00 ENCOUNTER FOR CHANGE OR REMOVAL OF NONSURGICAL WOUND DRESSING: ICD-10-CM

## 2019-03-05 LAB
ALBUMIN SERPL ELPH-MCNC: 2.5 G/DL — LOW (ref 3.3–5)
ALP SERPL-CCNC: 76 U/L — SIGNIFICANT CHANGE UP (ref 40–120)
ALT FLD-CCNC: 6 U/L — LOW (ref 10–45)
ANION GAP SERPL CALC-SCNC: 13 MMOL/L — SIGNIFICANT CHANGE UP (ref 5–17)
AST SERPL-CCNC: 8 U/L — LOW (ref 10–40)
BILIRUB SERPL-MCNC: 0.2 MG/DL — SIGNIFICANT CHANGE UP (ref 0.2–1.2)
BLD GP AB SCN SERPL QL: NEGATIVE — SIGNIFICANT CHANGE UP
BLD GP AB SCN SERPL QL: NEGATIVE — SIGNIFICANT CHANGE UP
BUN SERPL-MCNC: 6 MG/DL — LOW (ref 7–23)
CALCIUM SERPL-MCNC: 8.6 MG/DL — SIGNIFICANT CHANGE UP (ref 8.4–10.5)
CHLORIDE SERPL-SCNC: 100 MMOL/L — SIGNIFICANT CHANGE UP (ref 96–108)
CO2 SERPL-SCNC: 27 MMOL/L — SIGNIFICANT CHANGE UP (ref 22–31)
CREAT SERPL-MCNC: 0.48 MG/DL — LOW (ref 0.5–1.3)
CULTURE RESULTS: NO GROWTH — SIGNIFICANT CHANGE UP
GLUCOSE SERPL-MCNC: 90 MG/DL — SIGNIFICANT CHANGE UP (ref 70–99)
HCT VFR BLD CALC: 25.9 % — LOW (ref 34.5–45)
HGB BLD-MCNC: 8.3 G/DL — LOW (ref 11.5–15.5)
MAGNESIUM SERPL-MCNC: 1.6 MG/DL — SIGNIFICANT CHANGE UP (ref 1.6–2.6)
MCHC RBC-ENTMCNC: 28.2 PG — SIGNIFICANT CHANGE UP (ref 27–34)
MCHC RBC-ENTMCNC: 32 GM/DL — SIGNIFICANT CHANGE UP (ref 32–36)
MCV RBC AUTO: 88.1 FL — SIGNIFICANT CHANGE UP (ref 80–100)
NRBC # BLD: 0 /100 WBCS — SIGNIFICANT CHANGE UP (ref 0–0)
PHOSPHATE SERPL-MCNC: 4.1 MG/DL — SIGNIFICANT CHANGE UP (ref 2.5–4.5)
PLATELET # BLD AUTO: 562 K/UL — HIGH (ref 150–400)
POTASSIUM SERPL-MCNC: 3.2 MMOL/L — LOW (ref 3.5–5.3)
POTASSIUM SERPL-SCNC: 3.2 MMOL/L — LOW (ref 3.5–5.3)
PROT SERPL-MCNC: 5.8 G/DL — LOW (ref 6–8.3)
RBC # BLD: 2.94 M/UL — LOW (ref 3.8–5.2)
RBC # FLD: 15.1 % — HIGH (ref 10.3–14.5)
RH IG SCN BLD-IMP: NEGATIVE — SIGNIFICANT CHANGE UP
RH IG SCN BLD-IMP: NEGATIVE — SIGNIFICANT CHANGE UP
SODIUM SERPL-SCNC: 140 MMOL/L — SIGNIFICANT CHANGE UP (ref 135–145)
SPECIMEN SOURCE: SIGNIFICANT CHANGE UP
WBC # BLD: 12 K/UL — HIGH (ref 3.8–10.5)
WBC # FLD AUTO: 12 K/UL — HIGH (ref 3.8–10.5)

## 2019-03-05 PROCEDURE — 99152 MOD SED SAME PHYS/QHP 5/>YRS: CPT

## 2019-03-05 PROCEDURE — 49423 EXCHANGE DRAINAGE CATHETER: CPT

## 2019-03-05 PROCEDURE — 75984 XRAY CONTROL CATHETER CHANGE: CPT | Mod: 26

## 2019-03-05 RX ORDER — ACETAMINOPHEN 500 MG
1000 TABLET ORAL ONCE
Qty: 0 | Refills: 0 | Status: COMPLETED | OUTPATIENT
Start: 2019-03-05 | End: 2019-03-05

## 2019-03-05 RX ORDER — POTASSIUM CHLORIDE 20 MEQ
10 PACKET (EA) ORAL
Qty: 0 | Refills: 0 | Status: COMPLETED | OUTPATIENT
Start: 2019-03-05 | End: 2019-03-05

## 2019-03-05 RX ORDER — MAGNESIUM SULFATE 500 MG/ML
1 VIAL (ML) INJECTION EVERY 6 HOURS
Qty: 0 | Refills: 0 | Status: COMPLETED | OUTPATIENT
Start: 2019-03-05 | End: 2019-03-05

## 2019-03-05 RX ADMIN — ONDANSETRON 4 MILLIGRAM(S): 8 TABLET, FILM COATED ORAL at 22:10

## 2019-03-05 RX ADMIN — PIPERACILLIN AND TAZOBACTAM 200 GRAM(S): 4; .5 INJECTION, POWDER, LYOPHILIZED, FOR SOLUTION INTRAVENOUS at 11:36

## 2019-03-05 RX ADMIN — HEPARIN SODIUM 5000 UNIT(S): 5000 INJECTION INTRAVENOUS; SUBCUTANEOUS at 22:10

## 2019-03-05 RX ADMIN — Medication 400 MILLIGRAM(S): at 06:15

## 2019-03-05 RX ADMIN — PIPERACILLIN AND TAZOBACTAM 200 GRAM(S): 4; .5 INJECTION, POWDER, LYOPHILIZED, FOR SOLUTION INTRAVENOUS at 22:10

## 2019-03-05 RX ADMIN — SODIUM CHLORIDE 110 MILLILITER(S): 9 INJECTION, SOLUTION INTRAVENOUS at 22:11

## 2019-03-05 RX ADMIN — HYDROMORPHONE HYDROCHLORIDE 1 MILLIGRAM(S): 2 INJECTION INTRAMUSCULAR; INTRAVENOUS; SUBCUTANEOUS at 12:00

## 2019-03-05 RX ADMIN — HEPARIN SODIUM 5000 UNIT(S): 5000 INJECTION INTRAVENOUS; SUBCUTANEOUS at 05:24

## 2019-03-05 RX ADMIN — Medication 100 GRAM(S): at 07:57

## 2019-03-05 RX ADMIN — Medication 100 MILLIEQUIVALENT(S): at 18:43

## 2019-03-05 RX ADMIN — Medication 100 MILLIEQUIVALENT(S): at 10:13

## 2019-03-05 RX ADMIN — HYDROMORPHONE HYDROCHLORIDE 1 MILLIGRAM(S): 2 INJECTION INTRAMUSCULAR; INTRAVENOUS; SUBCUTANEOUS at 18:30

## 2019-03-05 RX ADMIN — PIPERACILLIN AND TAZOBACTAM 200 GRAM(S): 4; .5 INJECTION, POWDER, LYOPHILIZED, FOR SOLUTION INTRAVENOUS at 05:24

## 2019-03-05 RX ADMIN — HEPARIN SODIUM 5000 UNIT(S): 5000 INJECTION INTRAVENOUS; SUBCUTANEOUS at 16:55

## 2019-03-05 RX ADMIN — HYDROMORPHONE HYDROCHLORIDE 1 MILLIGRAM(S): 2 INJECTION INTRAMUSCULAR; INTRAVENOUS; SUBCUTANEOUS at 23:00

## 2019-03-05 RX ADMIN — Medication 100 GRAM(S): at 19:51

## 2019-03-05 RX ADMIN — Medication 1000 MILLIGRAM(S): at 07:00

## 2019-03-05 RX ADMIN — PIPERACILLIN AND TAZOBACTAM 200 GRAM(S): 4; .5 INJECTION, POWDER, LYOPHILIZED, FOR SOLUTION INTRAVENOUS at 18:01

## 2019-03-05 RX ADMIN — Medication 100 MILLIEQUIVALENT(S): at 12:38

## 2019-03-05 RX ADMIN — HYDROMORPHONE HYDROCHLORIDE 1 MILLIGRAM(S): 2 INJECTION INTRAMUSCULAR; INTRAVENOUS; SUBCUTANEOUS at 18:01

## 2019-03-05 RX ADMIN — HYDROMORPHONE HYDROCHLORIDE 1 MILLIGRAM(S): 2 INJECTION INTRAMUSCULAR; INTRAVENOUS; SUBCUTANEOUS at 11:36

## 2019-03-05 RX ADMIN — HYDROMORPHONE HYDROCHLORIDE 1 MILLIGRAM(S): 2 INJECTION INTRAMUSCULAR; INTRAVENOUS; SUBCUTANEOUS at 22:10

## 2019-03-05 NOTE — CHART NOTE - NSCHARTNOTEFT_GEN_A_CORE
Procedure:  Drainage catheter exchange     Indication:  Pelvic abscess from perforated diverticulitis    Clinical History:  Persistent abscess from perforated diverticulitis status post IR drain placement.    Meds:heparin  Injectable 5000 Unit(s) SubCutaneous every 8 hours  HYDROmorphone  Injectable 0.5 milliGRAM(s) IV Push every 4 hours PRN  HYDROmorphone  Injectable 1 milliGRAM(s) IV Push every 4 hours PRN  lactated ringers. 1000 milliLiter(s) IV Continuous <Continuous>  magnesium sulfate  IVPB 1 Gram(s) IV Intermittent every 6 hours  ondansetron Injectable 4 milliGRAM(s) IV Push every 6 hours PRN  piperacillin/tazobactam IVPB. 3.375 Gram(s) IV Intermittent every 6 hours  potassium chloride  10 mEq/100 mL IVPB 10 milliEquivalent(s) IV Intermittent every 1 hour      Allergies:  No Known Allergies      Labs:                           8.3    12.00 )-----------( 562      ( 05 Mar 2019 06:11 )             25.9     PT/INR - ( 04 Mar 2019 13:38 )   PT: 13.0 sec;   INR: 1.15          PTT - ( 04 Mar 2019 13:38 )  PTT:28.0 sec  03-05    140  |  100  |  6<L>  ----------------------------<  90  3.2<L>   |  27  |  0.48<L>    Ca    8.6      05 Mar 2019 06:11  Phos  4.1     03-05  Mg     1.6     03-05    TPro  5.8<L>  /  Alb  2.5<L>  /  TBili  0.2  /  DBili  x   /  AST  8<L>  /  ALT  6<L>  /  AlkPhos  76  03-05        Physical Exam:  No acute distress. Non-labored breathing.  Slightly tachycardic, regular. Abdomen soft. Abdominal drain site clean dry intact.     Anesthesia:  Moderate    ASA:  2     NPO:  Since 3/5/2019    Consent signed by patient

## 2019-03-05 NOTE — PROGRESS NOTE ADULT - ASSESSMENT
54 yo F with interval worsening collections related to diverticulitis    Pain/nausea control PRN  NPO/IVF  IV Zosyn (3/5-)  IR upsizing of drain on 3/5  AM Labs

## 2019-03-05 NOTE — PROGRESS NOTE ADULT - SUBJECTIVE AND OBJECTIVE BOX
24 hr events:  O/N: NPO @ [place] for upsizing of drain with IR in AM, CT results pending, wet read shows the abcess is slightly smaller (the one above the uterus), tmax 101 @ 5:40am, tylenol/ice chief is aware  3/4: Admitted for worsening collections related to diverticulitits, CT A/P Pending     SUBJECTIVE:  Pt seen and examined by chief resident. Pt is doing well, resting comfortably on bed. Pain controlled. Feeling feverish. No nausea or vomiting.       Vital Signs Last 24 Hrs  T(C): 37.2 (05 Mar 2019 07:24), Max: 38.4 (05 Mar 2019 05:38)  T(F): 99 (05 Mar 2019 07:24), Max: 101.1 (05 Mar 2019 05:38)  HR: 99 (05 Mar 2019 05:38) (94 - 102)  BP: 123/78 (05 Mar 2019 05:38) (102/67 - 133/90)  RR: 19 (05 Mar 2019 05:38) (18 - 19)  SpO2: 95% (05 Mar 2019 05:38) (95% - 100%)    Physical Exam:  General: NAD  Pulmonary: Nonlabored breathing, no respiratory distress  Abdominal: soft, NT/ND, EMILIE drain in place  Extremities: warm, well perfused      Lines/drains/tubes:  EMILIE    I&O's Summary    04 Mar 2019 07:01  -  05 Mar 2019 07:00  --------------------------------------------------------  IN: 1160 mL / OUT: 450 mL / NET: 710 mL        LABS:                        8.3    12.00 )-----------( 562      ( 05 Mar 2019 06:11 )             25.9     03-05    140  |  100  |  6<L>  ----------------------------<  90  3.2<L>   |  27  |  0.48<L>    Ca    8.6      05 Mar 2019 06:11  Phos  4.1     03-05  Mg     1.6     03-05    TPro  5.8<L>  /  Alb  2.5<L>  /  TBili  0.2  /  DBili  x   /  AST  8<L>  /  ALT  6<L>  /  AlkPhos  76  03-05    PT/INR - ( 04 Mar 2019 13:38 )   PT: 13.0 sec;   INR: 1.15          PTT - ( 04 Mar 2019 13:38 )  PTT:28.0 sec  Urinalysis Basic - ( 04 Mar 2019 13:37 )    Color: Yellow / Appearance: Clear / SG: >=1.030 / pH: x  Gluc: x / Ketone: 15 mg/dL  / Bili: Negative / Urobili: 0.2 E.U./dL   Blood: x / Protein: 30 mg/dL / Nitrite: NEGATIVE   Leuk Esterase: NEGATIVE / RBC: < 5 /HPF / WBC < 5 /HPF   Sq Epi: x / Non Sq Epi: 0-5 /HPF / Bacteria: Present /HPF      LIVER FUNCTIONS - ( 05 Mar 2019 06:11 )  Alb: 2.5 g/dL / Pro: 5.8 g/dL / ALK PHOS: 76 U/L / ALT: 6 U/L / AST: 8 U/L / GGT: x

## 2019-03-05 NOTE — CHART NOTE - NSCHARTNOTEFT_GEN_A_CORE
PROCEDURE:  DRAIN UPSIZE  PRIMARY SURGEON:  TYSON  ASSISTANT:  NONE  INDICATION:  RESIDUAL ABSCESS  EBL:  NONE  ANESTHESIA:  MODERATE SEDATION  COMPLICATIONS:  NONE  SPECIMENS:  NONE  FINDINGS:  FISTULA TO SIGMOID COLON    GENERAL CONDITION OF PATIENT:  GOOD  POSTOPERATIVE DIAGNOSIS:  STABLE, NO CHANGE IN CLINICAL STATUS  DISPOSITION: RECOVERY IR

## 2019-03-06 LAB
ANION GAP SERPL CALC-SCNC: 12 MMOL/L — SIGNIFICANT CHANGE UP (ref 5–17)
BUN SERPL-MCNC: 3 MG/DL — LOW (ref 7–23)
CALCIUM SERPL-MCNC: 8.9 MG/DL — SIGNIFICANT CHANGE UP (ref 8.4–10.5)
CHLORIDE SERPL-SCNC: 101 MMOL/L — SIGNIFICANT CHANGE UP (ref 96–108)
CO2 SERPL-SCNC: 28 MMOL/L — SIGNIFICANT CHANGE UP (ref 22–31)
CREAT SERPL-MCNC: 0.49 MG/DL — LOW (ref 0.5–1.3)
GLUCOSE SERPL-MCNC: 98 MG/DL — SIGNIFICANT CHANGE UP (ref 70–99)
HCT VFR BLD CALC: 28.2 % — LOW (ref 34.5–45)
HGB BLD-MCNC: 8.9 G/DL — LOW (ref 11.5–15.5)
MAGNESIUM SERPL-MCNC: 2.4 MG/DL — SIGNIFICANT CHANGE UP (ref 1.6–2.6)
MCHC RBC-ENTMCNC: 28.1 PG — SIGNIFICANT CHANGE UP (ref 27–34)
MCHC RBC-ENTMCNC: 31.6 GM/DL — LOW (ref 32–36)
MCV RBC AUTO: 89 FL — SIGNIFICANT CHANGE UP (ref 80–100)
NRBC # BLD: 0 /100 WBCS — SIGNIFICANT CHANGE UP (ref 0–0)
PHOSPHATE SERPL-MCNC: 4.3 MG/DL — SIGNIFICANT CHANGE UP (ref 2.5–4.5)
PLATELET # BLD AUTO: 593 K/UL — HIGH (ref 150–400)
POTASSIUM SERPL-MCNC: 3.9 MMOL/L — SIGNIFICANT CHANGE UP (ref 3.5–5.3)
POTASSIUM SERPL-SCNC: 3.9 MMOL/L — SIGNIFICANT CHANGE UP (ref 3.5–5.3)
RBC # BLD: 3.17 M/UL — LOW (ref 3.8–5.2)
RBC # FLD: 14.8 % — HIGH (ref 10.3–14.5)
SODIUM SERPL-SCNC: 141 MMOL/L — SIGNIFICANT CHANGE UP (ref 135–145)
WBC # BLD: 7.21 K/UL — SIGNIFICANT CHANGE UP (ref 3.8–10.5)
WBC # FLD AUTO: 7.21 K/UL — SIGNIFICANT CHANGE UP (ref 3.8–10.5)

## 2019-03-06 PROCEDURE — 93010 ELECTROCARDIOGRAM REPORT: CPT

## 2019-03-06 RX ORDER — NEOMYCIN SULFATE 500 MG/1
1000 TABLET ORAL ONCE
Qty: 0 | Refills: 0 | Status: COMPLETED | OUTPATIENT
Start: 2019-03-07 | End: 2019-03-07

## 2019-03-06 RX ORDER — METRONIDAZOLE 500 MG
500 TABLET ORAL ONCE
Qty: 0 | Refills: 0 | Status: COMPLETED | OUTPATIENT
Start: 2019-03-07 | End: 2019-03-07

## 2019-03-06 RX ORDER — POLYETHYLENE GLYCOL 3350 17 G/17G
17 POWDER, FOR SOLUTION ORAL ONCE
Qty: 0 | Refills: 0 | Status: DISCONTINUED | OUTPATIENT
Start: 2019-03-06 | End: 2019-03-06

## 2019-03-06 RX ORDER — DOCUSATE SODIUM 100 MG
100 CAPSULE ORAL DAILY
Qty: 0 | Refills: 0 | Status: DISCONTINUED | OUTPATIENT
Start: 2019-03-06 | End: 2019-03-06

## 2019-03-06 RX ORDER — SOD SULF/SODIUM/NAHCO3/KCL/PEG
4000 SOLUTION, RECONSTITUTED, ORAL ORAL ONCE
Qty: 0 | Refills: 0 | Status: COMPLETED | OUTPATIENT
Start: 2019-03-06 | End: 2019-03-06

## 2019-03-06 RX ORDER — NEOMYCIN SULFATE 500 MG/1
1000 TABLET ORAL
Qty: 0 | Refills: 0 | Status: COMPLETED | OUTPATIENT
Start: 2019-03-06 | End: 2019-03-06

## 2019-03-06 RX ORDER — POTASSIUM CHLORIDE 20 MEQ
20 PACKET (EA) ORAL ONCE
Qty: 0 | Refills: 0 | Status: COMPLETED | OUTPATIENT
Start: 2019-03-06 | End: 2019-03-06

## 2019-03-06 RX ORDER — METRONIDAZOLE 500 MG
500 TABLET ORAL
Qty: 0 | Refills: 0 | Status: COMPLETED | OUTPATIENT
Start: 2019-03-06 | End: 2019-03-06

## 2019-03-06 RX ADMIN — Medication 20 MILLIEQUIVALENT(S): at 12:28

## 2019-03-06 RX ADMIN — HYDROMORPHONE HYDROCHLORIDE 0.5 MILLIGRAM(S): 2 INJECTION INTRAMUSCULAR; INTRAVENOUS; SUBCUTANEOUS at 18:41

## 2019-03-06 RX ADMIN — PIPERACILLIN AND TAZOBACTAM 200 GRAM(S): 4; .5 INJECTION, POWDER, LYOPHILIZED, FOR SOLUTION INTRAVENOUS at 22:49

## 2019-03-06 RX ADMIN — HYDROMORPHONE HYDROCHLORIDE 1 MILLIGRAM(S): 2 INJECTION INTRAMUSCULAR; INTRAVENOUS; SUBCUTANEOUS at 02:19

## 2019-03-06 RX ADMIN — Medication 500 MILLIGRAM(S): at 18:08

## 2019-03-06 RX ADMIN — Medication 500 MILLIGRAM(S): at 19:16

## 2019-03-06 RX ADMIN — NEOMYCIN SULFATE 1000 MILLIGRAM(S): 500 TABLET ORAL at 18:07

## 2019-03-06 RX ADMIN — HYDROMORPHONE HYDROCHLORIDE 1 MILLIGRAM(S): 2 INJECTION INTRAMUSCULAR; INTRAVENOUS; SUBCUTANEOUS at 07:15

## 2019-03-06 RX ADMIN — Medication 4000 MILLILITER(S): at 18:15

## 2019-03-06 RX ADMIN — HEPARIN SODIUM 5000 UNIT(S): 5000 INJECTION INTRAVENOUS; SUBCUTANEOUS at 22:49

## 2019-03-06 RX ADMIN — HYDROMORPHONE HYDROCHLORIDE 0.5 MILLIGRAM(S): 2 INJECTION INTRAMUSCULAR; INTRAVENOUS; SUBCUTANEOUS at 19:00

## 2019-03-06 RX ADMIN — HYDROMORPHONE HYDROCHLORIDE 1 MILLIGRAM(S): 2 INJECTION INTRAMUSCULAR; INTRAVENOUS; SUBCUTANEOUS at 02:40

## 2019-03-06 RX ADMIN — HYDROMORPHONE HYDROCHLORIDE 0.5 MILLIGRAM(S): 2 INJECTION INTRAMUSCULAR; INTRAVENOUS; SUBCUTANEOUS at 14:40

## 2019-03-06 RX ADMIN — HYDROMORPHONE HYDROCHLORIDE 0.5 MILLIGRAM(S): 2 INJECTION INTRAMUSCULAR; INTRAVENOUS; SUBCUTANEOUS at 14:26

## 2019-03-06 RX ADMIN — HYDROMORPHONE HYDROCHLORIDE 0.5 MILLIGRAM(S): 2 INJECTION INTRAMUSCULAR; INTRAVENOUS; SUBCUTANEOUS at 10:46

## 2019-03-06 RX ADMIN — PIPERACILLIN AND TAZOBACTAM 200 GRAM(S): 4; .5 INJECTION, POWDER, LYOPHILIZED, FOR SOLUTION INTRAVENOUS at 05:43

## 2019-03-06 RX ADMIN — HEPARIN SODIUM 5000 UNIT(S): 5000 INJECTION INTRAVENOUS; SUBCUTANEOUS at 13:37

## 2019-03-06 RX ADMIN — HEPARIN SODIUM 5000 UNIT(S): 5000 INJECTION INTRAVENOUS; SUBCUTANEOUS at 05:43

## 2019-03-06 RX ADMIN — PIPERACILLIN AND TAZOBACTAM 200 GRAM(S): 4; .5 INJECTION, POWDER, LYOPHILIZED, FOR SOLUTION INTRAVENOUS at 18:09

## 2019-03-06 RX ADMIN — PIPERACILLIN AND TAZOBACTAM 200 GRAM(S): 4; .5 INJECTION, POWDER, LYOPHILIZED, FOR SOLUTION INTRAVENOUS at 12:22

## 2019-03-06 RX ADMIN — NEOMYCIN SULFATE 1000 MILLIGRAM(S): 500 TABLET ORAL at 19:16

## 2019-03-06 RX ADMIN — HYDROMORPHONE HYDROCHLORIDE 1 MILLIGRAM(S): 2 INJECTION INTRAMUSCULAR; INTRAVENOUS; SUBCUTANEOUS at 06:46

## 2019-03-06 RX ADMIN — HYDROMORPHONE HYDROCHLORIDE 1 MILLIGRAM(S): 2 INJECTION INTRAMUSCULAR; INTRAVENOUS; SUBCUTANEOUS at 22:52

## 2019-03-06 RX ADMIN — HYDROMORPHONE HYDROCHLORIDE 0.5 MILLIGRAM(S): 2 INJECTION INTRAMUSCULAR; INTRAVENOUS; SUBCUTANEOUS at 10:26

## 2019-03-06 RX ADMIN — HYDROMORPHONE HYDROCHLORIDE 1 MILLIGRAM(S): 2 INJECTION INTRAMUSCULAR; INTRAVENOUS; SUBCUTANEOUS at 23:00

## 2019-03-06 NOTE — PROGRESS NOTE ADULT - SUBJECTIVE AND OBJECTIVE BOX
INTERVAL HPI/OVERNIGHT EVENTS: JEROME    SUBJECTIVE:  pt seen at bedside, without complaints at this time    MEDICATIONS  (STANDING):  heparin  Injectable 5000 Unit(s) SubCutaneous every 8 hours  lactated ringers. 1000 milliLiter(s) (110 mL/Hr) IV Continuous <Continuous>  piperacillin/tazobactam IVPB. 3.375 Gram(s) IV Intermittent every 6 hours    MEDICATIONS  (PRN):  HYDROmorphone  Injectable 0.5 milliGRAM(s) IV Push every 4 hours PRN Moderate Pain (4 - 6)  HYDROmorphone  Injectable 1 milliGRAM(s) IV Push every 4 hours PRN Severe Pain (7 - 10)  ondansetron Injectable 4 milliGRAM(s) IV Push every 6 hours PRN Nausea      Vital Signs Last 24 Hrs  T(C): 36.8 (06 Mar 2019 06:07), Max: 37.5 (05 Mar 2019 09:03)  T(F): 98.3 (06 Mar 2019 06:07), Max: 99.5 (05 Mar 2019 09:03)  HR: 83 (06 Mar 2019 06:07) (83 - 98)  BP: 110/74 (06 Mar 2019 06:07) (106/71 - 145/83)  BP(mean): --  RR: 18 (06 Mar 2019 06:07) (17 - 18)  SpO2: 96% (06 Mar 2019 06:07) (96% - 98%)    PHYSICAL EXAM:      Constitutional: A&Ox3    Respiratory: non labored breathing, no respiratory distress    Cardiovascular: NSR, RRR    Gastrointestinal: soft, nondistended, mild tenderness at drain site, EMILIE drain in place with minimal purulent contents, to self suction    Extremities: (-) edema                  I&O's Detail    05 Mar 2019 07:01  -  06 Mar 2019 07:00  --------------------------------------------------------  IN:    IV PiggyBack: 200 mL    lactated ringers.: 1120 mL    Oral Fluid: 520 mL  Total IN: 1840 mL    OUT:    Bulb: 20 mL    Voided: 2300 mL  Total OUT: 2320 mL    Total NET: -480 mL          LABS:                        8.9    7.21  )-----------( 593      ( 06 Mar 2019 07:01 )             28.2     03-05    140  |  100  |  6<L>  ----------------------------<  90  3.2<L>   |  27  |  0.48<L>    Ca    8.6      05 Mar 2019 06:11  Phos  4.1     03-05  Mg     1.6     03-05    TPro  5.8<L>  /  Alb  2.5<L>  /  TBili  0.2  /  DBili  x   /  AST  8<L>  /  ALT  6<L>  /  AlkPhos  76  03-05    PT/INR - ( 04 Mar 2019 13:38 )   PT: 13.0 sec;   INR: 1.15          PTT - ( 04 Mar 2019 13:38 )  PTT:28.0 sec  Urinalysis Basic - ( 04 Mar 2019 13:37 )    Color: Yellow / Appearance: Clear / SG: >=1.030 / pH: x  Gluc: x / Ketone: 15 mg/dL  / Bili: Negative / Urobili: 0.2 E.U./dL   Blood: x / Protein: 30 mg/dL / Nitrite: NEGATIVE   Leuk Esterase: NEGATIVE / RBC: < 5 /HPF / WBC < 5 /HPF   Sq Epi: x / Non Sq Epi: 0-5 /HPF / Bacteria: Present /HPF        RADIOLOGY & ADDITIONAL STUDIES:

## 2019-03-06 NOTE — PROGRESS NOTE ADULT - ASSESSMENT
52 yo F with interval worsening collections related to diverticulitis    Pain/nausea control PRN  CLD/IVF  IV Zosyn (3/5-)  IR upsizing of drain s/p on 3/5  AM Labs

## 2019-03-07 ENCOUNTER — RESULT REVIEW (OUTPATIENT)
Age: 54
End: 2019-03-07

## 2019-03-07 LAB
ANION GAP SERPL CALC-SCNC: 10 MMOL/L — SIGNIFICANT CHANGE UP (ref 5–17)
BUN SERPL-MCNC: 2 MG/DL — LOW (ref 7–23)
CALCIUM SERPL-MCNC: 9 MG/DL — SIGNIFICANT CHANGE UP (ref 8.4–10.5)
CHLORIDE SERPL-SCNC: 102 MMOL/L — SIGNIFICANT CHANGE UP (ref 96–108)
CO2 SERPL-SCNC: 26 MMOL/L — SIGNIFICANT CHANGE UP (ref 22–31)
CREAT SERPL-MCNC: 0.5 MG/DL — SIGNIFICANT CHANGE UP (ref 0.5–1.3)
GLUCOSE SERPL-MCNC: 94 MG/DL — SIGNIFICANT CHANGE UP (ref 70–99)
HCT VFR BLD CALC: 28.2 % — LOW (ref 34.5–45)
HGB BLD-MCNC: 8.8 G/DL — LOW (ref 11.5–15.5)
MAGNESIUM SERPL-MCNC: 2.1 MG/DL — SIGNIFICANT CHANGE UP (ref 1.6–2.6)
MCHC RBC-ENTMCNC: 27.7 PG — SIGNIFICANT CHANGE UP (ref 27–34)
MCHC RBC-ENTMCNC: 31.2 GM/DL — LOW (ref 32–36)
MCV RBC AUTO: 88.7 FL — SIGNIFICANT CHANGE UP (ref 80–100)
NRBC # BLD: 0 /100 WBCS — SIGNIFICANT CHANGE UP (ref 0–0)
PHOSPHATE SERPL-MCNC: 4 MG/DL — SIGNIFICANT CHANGE UP (ref 2.5–4.5)
PLATELET # BLD AUTO: 584 K/UL — HIGH (ref 150–400)
POTASSIUM SERPL-MCNC: 3.6 MMOL/L — SIGNIFICANT CHANGE UP (ref 3.5–5.3)
POTASSIUM SERPL-SCNC: 3.6 MMOL/L — SIGNIFICANT CHANGE UP (ref 3.5–5.3)
RBC # BLD: 3.18 M/UL — LOW (ref 3.8–5.2)
RBC # FLD: 14.7 % — HIGH (ref 10.3–14.5)
SODIUM SERPL-SCNC: 138 MMOL/L — SIGNIFICANT CHANGE UP (ref 135–145)
WBC # BLD: 4.68 K/UL — SIGNIFICANT CHANGE UP (ref 3.8–10.5)
WBC # FLD AUTO: 4.68 K/UL — SIGNIFICANT CHANGE UP (ref 3.8–10.5)

## 2019-03-07 PROCEDURE — 49020 DRAINAGE ABDOM ABSCESS OPEN: CPT | Mod: GC

## 2019-03-07 PROCEDURE — 44602 SUTURE SMALL INTESTINE: CPT | Mod: GC,59

## 2019-03-07 PROCEDURE — 44310 ILEOSTOMY/JEJUNOSTOMY: CPT | Mod: GC

## 2019-03-07 RX ORDER — ONDANSETRON 8 MG/1
4 TABLET, FILM COATED ORAL EVERY 6 HOURS
Qty: 0 | Refills: 0 | Status: DISCONTINUED | OUTPATIENT
Start: 2019-03-07 | End: 2019-03-12

## 2019-03-07 RX ORDER — SODIUM CHLORIDE 9 MG/ML
1000 INJECTION, SOLUTION INTRAVENOUS
Qty: 0 | Refills: 0 | Status: DISCONTINUED | OUTPATIENT
Start: 2019-03-07 | End: 2019-03-11

## 2019-03-07 RX ORDER — POTASSIUM CHLORIDE 20 MEQ
10 PACKET (EA) ORAL
Qty: 0 | Refills: 0 | Status: COMPLETED | OUTPATIENT
Start: 2019-03-07 | End: 2019-03-07

## 2019-03-07 RX ORDER — HEPARIN SODIUM 5000 [USP'U]/ML
5000 INJECTION INTRAVENOUS; SUBCUTANEOUS EVERY 8 HOURS
Qty: 0 | Refills: 0 | Status: DISCONTINUED | OUTPATIENT
Start: 2019-03-07 | End: 2019-03-12

## 2019-03-07 RX ORDER — HYDROMORPHONE HYDROCHLORIDE 2 MG/ML
0.5 INJECTION INTRAMUSCULAR; INTRAVENOUS; SUBCUTANEOUS EVERY 4 HOURS
Qty: 0 | Refills: 0 | Status: DISCONTINUED | OUTPATIENT
Start: 2019-03-07 | End: 2019-03-11

## 2019-03-07 RX ORDER — HYDROMORPHONE HYDROCHLORIDE 2 MG/ML
1 INJECTION INTRAMUSCULAR; INTRAVENOUS; SUBCUTANEOUS EVERY 4 HOURS
Qty: 0 | Refills: 0 | Status: DISCONTINUED | OUTPATIENT
Start: 2019-03-07 | End: 2019-03-11

## 2019-03-07 RX ORDER — PIPERACILLIN AND TAZOBACTAM 4; .5 G/20ML; G/20ML
3.38 INJECTION, POWDER, LYOPHILIZED, FOR SOLUTION INTRAVENOUS EVERY 6 HOURS
Qty: 0 | Refills: 0 | Status: DISCONTINUED | OUTPATIENT
Start: 2019-03-07 | End: 2019-03-12

## 2019-03-07 RX ORDER — ACETAMINOPHEN 500 MG
1000 TABLET ORAL ONCE
Qty: 0 | Refills: 0 | Status: COMPLETED | OUTPATIENT
Start: 2019-03-07 | End: 2019-03-07

## 2019-03-07 RX ADMIN — HYDROMORPHONE HYDROCHLORIDE 1 MILLIGRAM(S): 2 INJECTION INTRAMUSCULAR; INTRAVENOUS; SUBCUTANEOUS at 09:42

## 2019-03-07 RX ADMIN — Medication 100 MILLIEQUIVALENT(S): at 11:23

## 2019-03-07 RX ADMIN — PIPERACILLIN AND TAZOBACTAM 200 GRAM(S): 4; .5 INJECTION, POWDER, LYOPHILIZED, FOR SOLUTION INTRAVENOUS at 10:17

## 2019-03-07 RX ADMIN — HEPARIN SODIUM 5000 UNIT(S): 5000 INJECTION INTRAVENOUS; SUBCUTANEOUS at 05:33

## 2019-03-07 RX ADMIN — HYDROMORPHONE HYDROCHLORIDE 1 MILLIGRAM(S): 2 INJECTION INTRAMUSCULAR; INTRAVENOUS; SUBCUTANEOUS at 05:50

## 2019-03-07 RX ADMIN — Medication 500 MILLIGRAM(S): at 05:33

## 2019-03-07 RX ADMIN — Medication 100 MILLIEQUIVALENT(S): at 10:12

## 2019-03-07 RX ADMIN — SODIUM CHLORIDE 110 MILLILITER(S): 9 INJECTION, SOLUTION INTRAVENOUS at 05:34

## 2019-03-07 RX ADMIN — HYDROMORPHONE HYDROCHLORIDE 1 MILLIGRAM(S): 2 INJECTION INTRAMUSCULAR; INTRAVENOUS; SUBCUTANEOUS at 23:41

## 2019-03-07 RX ADMIN — HYDROMORPHONE HYDROCHLORIDE 1 MILLIGRAM(S): 2 INJECTION INTRAMUSCULAR; INTRAVENOUS; SUBCUTANEOUS at 09:38

## 2019-03-07 RX ADMIN — HYDROMORPHONE HYDROCHLORIDE 1 MILLIGRAM(S): 2 INJECTION INTRAMUSCULAR; INTRAVENOUS; SUBCUTANEOUS at 19:44

## 2019-03-07 RX ADMIN — HYDROMORPHONE HYDROCHLORIDE 0.5 MILLIGRAM(S): 2 INJECTION INTRAMUSCULAR; INTRAVENOUS; SUBCUTANEOUS at 20:04

## 2019-03-07 RX ADMIN — HYDROMORPHONE HYDROCHLORIDE 1 MILLIGRAM(S): 2 INJECTION INTRAMUSCULAR; INTRAVENOUS; SUBCUTANEOUS at 19:20

## 2019-03-07 RX ADMIN — SODIUM CHLORIDE 100 MILLILITER(S): 9 INJECTION, SOLUTION INTRAVENOUS at 19:01

## 2019-03-07 RX ADMIN — PIPERACILLIN AND TAZOBACTAM 100 GRAM(S): 4; .5 INJECTION, POWDER, LYOPHILIZED, FOR SOLUTION INTRAVENOUS at 23:41

## 2019-03-07 RX ADMIN — PIPERACILLIN AND TAZOBACTAM 200 GRAM(S): 4; .5 INJECTION, POWDER, LYOPHILIZED, FOR SOLUTION INTRAVENOUS at 05:33

## 2019-03-07 RX ADMIN — HYDROMORPHONE HYDROCHLORIDE 1 MILLIGRAM(S): 2 INJECTION INTRAMUSCULAR; INTRAVENOUS; SUBCUTANEOUS at 06:30

## 2019-03-07 RX ADMIN — HYDROMORPHONE HYDROCHLORIDE 0.5 MILLIGRAM(S): 2 INJECTION INTRAMUSCULAR; INTRAVENOUS; SUBCUTANEOUS at 20:15

## 2019-03-07 RX ADMIN — HEPARIN SODIUM 5000 UNIT(S): 5000 INJECTION INTRAVENOUS; SUBCUTANEOUS at 23:41

## 2019-03-07 RX ADMIN — NEOMYCIN SULFATE 1000 MILLIGRAM(S): 500 TABLET ORAL at 05:33

## 2019-03-07 RX ADMIN — Medication 1000 MILLIGRAM(S): at 14:44

## 2019-03-07 NOTE — PROGRESS NOTE ADULT - SUBJECTIVE AND OBJECTIVE BOX
24 hr events:  O/N: drinking golytely, abx preop given, NPO @ [facility], VSS, JEROME  3/6: add on for OR tomorrow, golytely    SUBJECTIVE:  Pt seen and examined by chief resident. Pt is doing well, resting comfortably on bed. Pain controlled. Diet tolerated. Ambulating out of bed. +F/+BM. No nausea or vomiting. No complaints at this time.      Vital Signs Last 24 Hrs  T(C): 36.6 (07 Mar 2019 08:05), Max: 37.4 (07 Mar 2019 05:35)  T(F): 97.8 (07 Mar 2019 08:05), Max: 99.3 (07 Mar 2019 05:35)  HR: 89 (07 Mar 2019 08:05) (78 - 98)  BP: 103/67 (07 Mar 2019 08:05) (103/67 - 123/75)  RR: 15 (07 Mar 2019 08:05) (15 - 17)  SpO2: 98% (07 Mar 2019 08:05) (96% - 100%)    Physical Exam:  General: NAD  Pulmonary: Nonlabored breathing, no respiratory distress  Abdominal: soft, nontender, non distended. EMILIE in place with minimal purulent/fecalith contents  Extremities: warm, well perfused.     Lines/drains/tubes:  EMILIE    I&O's Summary    06 Mar 2019 07:01  -  07 Mar 2019 07:00  --------------------------------------------------------  IN: 1870 mL / OUT: 570 mL / NET: 1300 mL    07 Mar 2019 07:01  -  07 Mar 2019 09:34  --------------------------------------------------------  IN: 220 mL / OUT: 32 mL / NET: 188 mL        LABS:                        8.8    4.68  )-----------( 584      ( 07 Mar 2019 05:47 )             28.2     03-07    138  |  102  |  2<L>  ----------------------------<  94  3.6   |  26  |  0.50    Ca    9.0      07 Mar 2019 05:47  Phos  4.0     03-07  Mg     2.1     03-07

## 2019-03-07 NOTE — ADVANCED PRACTICE NURSE CONSULT - REASON FOR CONSULT
[facility] nurse consult for presurgical stoma marking. Patient is a 52 yo female w/ recent prolonged admission for perforated diverticulitis, which required IR drainage, with drain remaining in place, and IV ABX discharged on 2/4.  Patient underwent CT A/P on 2/22 with interval worsening in abdominal collections. Patient has been otherwise asymptomatic, and tolerating PO intake, without N/V.

## 2019-03-07 NOTE — BRIEF OPERATIVE NOTE - OPERATION/FINDINGS
Significant adhesions, interloop bowel adhesions, fistulized small bowel and colon, numerous peritoneal nodules in mesentery and along surface of bowel (negative for malignancy on frozen section), abscess cavity identified and drained, creation of loop ileostomy, placement of 19Fr channel drain in pelvis and pelvic abscess cavity, fascia closed primarily, subcutaneous closed loosely with intermittent packing. Significant adhesions, interloop bowel adhesions, fistulized small bowel and colon, repair of enterotomy/small bowel defect, numerous peritoneal nodules in mesentery and along surface of bowel (negative for malignancy on frozen section), abscess cavity identified and drained, creation of loop ileostomy, placement of 19Fr channel drain in pelvis and pelvic abscess cavity, fascia closed primarily, subcutaneous closed loosely with intermittent packing.

## 2019-03-07 NOTE — BRIEF OPERATIVE NOTE - PROCEDURE
<<-----Click on this checkbox to enter Procedure Loop ileostomy  03/07/2019    Active  CCRIPPS  Lysis of adhesions  03/07/2019    Active  CCRIPPS  Exploratory laparotomy  03/07/2019    Active  CCRIPPS  Diagnostic laparoscopy  03/07/2019    Active  CCRIPPS

## 2019-03-07 NOTE — PRE-OP CHECKLIST - SELECT TESTS ORDERED
Type and Screen/Urinalysis/CBC/EKG/BMP/CXR CXR/Type and Screen/PT/PTT/Urinalysis/BMP/CBC/INR/EKG EKG/CXR/INR/PT/PTT/94/BMP/CBC/POCT Blood Glucose/Type and Screen/Urinalysis

## 2019-03-07 NOTE — ADVANCED PRACTICE NURSE CONSULT - ASSESSMENT
EMILIE drain in right lower quadrant of abdomen draining tan colored effluent.  Instructed patient on ileostomy and colostomy stoma marking ordered by surgical team. Assessed location of rectus abdominis muscle and patients abdomen in sitting, standing and bending positions for abdominal creases. Marked abdomen for ileostomy and colostomy and applied transparent dressing over site. Patient stated aunt and/or cousin will be involved in ostomy teaching post op.

## 2019-03-07 NOTE — PROGRESS NOTE ADULT - SUBJECTIVE AND OBJECTIVE BOX
POST-OPERATIVE NOTE    Procedure: diagnostic laparoscopy converted to ex lap, LOC, abdominal washout, diverting loop ileostomy creation    Diagnosis/Indication: diverticulitis    Surgeon: Dr. Ybarra    S: Pt reports some mild back pain. Denies CP, SOB, ZAVALETA, calf tenderness. Pain controlled with medication. Denies nausea, vomiting.    O:  T(C): 36.6 (03-07-19 @ 20:15), Max: 36.7 (03-07-19 @ 18:53)  T(F): 97.8 (03-07-19 @ 20:15), Max: 98 (03-07-19 @ 18:53)  HR: 108 (03-07-19 @ 21:30) (99 - 114)  BP: 123/71 (03-07-19 @ 21:30) (113/59 - 133/74)  RR: 16 (03-07-19 @ 21:30) (12 - 23)  SpO2: 100% (03-07-19 @ 21:30) (97% - 100%)  Wt(kg): --                        8.8    4.68  )-----------( 584      ( 07 Mar 2019 05:47 )             28.2     03-07    138  |  102  |  2<L>  ----------------------------<  94  3.6   |  26  |  0.50    Ca    9.0      07 Mar 2019 05:47  Phos  4.0     03-07  Mg     2.1     03-07        Gen: NAD, resting comfortably in bed  C/V: NSR  Pulm: Nonlabored breathing, no respiratory distress  Abd: soft, nondistended. appropriately tender, surgical incisions clean, dry ,and intact, loop ileostomy with pink stoma, EMILIE x 1 SS  Extrem: WWP, no calf edema or tenderness, SCDs in place        Dispo pending pain control, PO tolerance, clinical improvement

## 2019-03-07 NOTE — PROGRESS NOTE ADULT - ASSESSMENT
A/P: 53y Female s/p above procedure  Diet: NPO  IVF: LR @100cc/hr  zosyn  Pain/nausea control  JPx1  garcia  SQH/SCDs/OOBA/IS  ostomy nurse consult

## 2019-03-07 NOTE — PROGRESS NOTE ADULT - ASSESSMENT
54 yo F with interval worsening collections related to diverticulitis    Pain/nausea control PRN  NPO/IVF  IV Zosyn (3/5-)  IR upsizing of drain on 3/5  AM Labs  OR today  Ostomy nurse for ileostomy vs colostomy markings

## 2019-03-08 ENCOUNTER — TRANSCRIPTION ENCOUNTER (OUTPATIENT)
Age: 54
End: 2019-03-08

## 2019-03-08 LAB
ANION GAP SERPL CALC-SCNC: 9 MMOL/L — SIGNIFICANT CHANGE UP (ref 5–17)
BASOPHILS # BLD AUTO: 0.02 K/UL — SIGNIFICANT CHANGE UP (ref 0–0.2)
BASOPHILS NFR BLD AUTO: 0.1 % — SIGNIFICANT CHANGE UP (ref 0–2)
BUN SERPL-MCNC: 4 MG/DL — LOW (ref 7–23)
CALCIUM SERPL-MCNC: 8.4 MG/DL — SIGNIFICANT CHANGE UP (ref 8.4–10.5)
CHLORIDE SERPL-SCNC: 104 MMOL/L — SIGNIFICANT CHANGE UP (ref 96–108)
CO2 SERPL-SCNC: 25 MMOL/L — SIGNIFICANT CHANGE UP (ref 22–31)
CREAT SERPL-MCNC: 0.53 MG/DL — SIGNIFICANT CHANGE UP (ref 0.5–1.3)
EOSINOPHIL # BLD AUTO: 0 K/UL — SIGNIFICANT CHANGE UP (ref 0–0.5)
EOSINOPHIL NFR BLD AUTO: 0 % — SIGNIFICANT CHANGE UP (ref 0–6)
GLUCOSE SERPL-MCNC: 127 MG/DL — HIGH (ref 70–99)
HCT VFR BLD CALC: 25.3 % — LOW (ref 34.5–45)
HGB BLD-MCNC: 7.8 G/DL — LOW (ref 11.5–15.5)
IMM GRANULOCYTES NFR BLD AUTO: 0.8 % — SIGNIFICANT CHANGE UP (ref 0–1.5)
LYMPHOCYTES # BLD AUTO: 1.55 K/UL — SIGNIFICANT CHANGE UP (ref 1–3.3)
LYMPHOCYTES # BLD AUTO: 9.4 % — LOW (ref 13–44)
MAGNESIUM SERPL-MCNC: 1.6 MG/DL — SIGNIFICANT CHANGE UP (ref 1.6–2.6)
MCHC RBC-ENTMCNC: 27.7 PG — SIGNIFICANT CHANGE UP (ref 27–34)
MCHC RBC-ENTMCNC: 30.8 GM/DL — LOW (ref 32–36)
MCV RBC AUTO: 89.7 FL — SIGNIFICANT CHANGE UP (ref 80–100)
MONOCYTES # BLD AUTO: 1 K/UL — HIGH (ref 0–0.9)
MONOCYTES NFR BLD AUTO: 6.1 % — SIGNIFICANT CHANGE UP (ref 2–14)
NEUTROPHILS # BLD AUTO: 13.77 K/UL — HIGH (ref 1.8–7.4)
NEUTROPHILS NFR BLD AUTO: 83.6 % — HIGH (ref 43–77)
NRBC # BLD: 0 /100 WBCS — SIGNIFICANT CHANGE UP (ref 0–0)
PHOSPHATE SERPL-MCNC: 4.7 MG/DL — HIGH (ref 2.5–4.5)
PLATELET # BLD AUTO: 758 K/UL — HIGH (ref 150–400)
POTASSIUM SERPL-MCNC: 4.6 MMOL/L — SIGNIFICANT CHANGE UP (ref 3.5–5.3)
POTASSIUM SERPL-SCNC: 4.6 MMOL/L — SIGNIFICANT CHANGE UP (ref 3.5–5.3)
RBC # BLD: 2.82 M/UL — LOW (ref 3.8–5.2)
RBC # FLD: 15.2 % — HIGH (ref 10.3–14.5)
SODIUM SERPL-SCNC: 138 MMOL/L — SIGNIFICANT CHANGE UP (ref 135–145)
WBC # BLD: 16.48 K/UL — HIGH (ref 3.8–10.5)
WBC # FLD AUTO: 16.48 K/UL — HIGH (ref 3.8–10.5)

## 2019-03-08 RX ORDER — SODIUM CHLORIDE 9 MG/ML
500 INJECTION, SOLUTION INTRAVENOUS ONCE
Qty: 0 | Refills: 0 | Status: COMPLETED | OUTPATIENT
Start: 2019-03-08 | End: 2019-03-08

## 2019-03-08 RX ORDER — ACETAMINOPHEN 500 MG
1000 TABLET ORAL ONCE
Qty: 0 | Refills: 0 | Status: COMPLETED | OUTPATIENT
Start: 2019-03-08 | End: 2019-03-08

## 2019-03-08 RX ORDER — MAGNESIUM SULFATE 500 MG/ML
2 VIAL (ML) INJECTION ONCE
Qty: 0 | Refills: 0 | Status: COMPLETED | OUTPATIENT
Start: 2019-03-08 | End: 2019-03-08

## 2019-03-08 RX ADMIN — HYDROMORPHONE HYDROCHLORIDE 1 MILLIGRAM(S): 2 INJECTION INTRAMUSCULAR; INTRAVENOUS; SUBCUTANEOUS at 14:51

## 2019-03-08 RX ADMIN — Medication 50 GRAM(S): at 10:32

## 2019-03-08 RX ADMIN — PIPERACILLIN AND TAZOBACTAM 100 GRAM(S): 4; .5 INJECTION, POWDER, LYOPHILIZED, FOR SOLUTION INTRAVENOUS at 12:47

## 2019-03-08 RX ADMIN — HYDROMORPHONE HYDROCHLORIDE 0.5 MILLIGRAM(S): 2 INJECTION INTRAMUSCULAR; INTRAVENOUS; SUBCUTANEOUS at 11:33

## 2019-03-08 RX ADMIN — HYDROMORPHONE HYDROCHLORIDE 0.5 MILLIGRAM(S): 2 INJECTION INTRAMUSCULAR; INTRAVENOUS; SUBCUTANEOUS at 03:15

## 2019-03-08 RX ADMIN — HYDROMORPHONE HYDROCHLORIDE 1 MILLIGRAM(S): 2 INJECTION INTRAMUSCULAR; INTRAVENOUS; SUBCUTANEOUS at 23:15

## 2019-03-08 RX ADMIN — HYDROMORPHONE HYDROCHLORIDE 1 MILLIGRAM(S): 2 INJECTION INTRAMUSCULAR; INTRAVENOUS; SUBCUTANEOUS at 14:25

## 2019-03-08 RX ADMIN — HYDROMORPHONE HYDROCHLORIDE 0.5 MILLIGRAM(S): 2 INJECTION INTRAMUSCULAR; INTRAVENOUS; SUBCUTANEOUS at 07:11

## 2019-03-08 RX ADMIN — HYDROMORPHONE HYDROCHLORIDE 1 MILLIGRAM(S): 2 INJECTION INTRAMUSCULAR; INTRAVENOUS; SUBCUTANEOUS at 22:30

## 2019-03-08 RX ADMIN — SODIUM CHLORIDE 1000 MILLILITER(S): 9 INJECTION, SOLUTION INTRAVENOUS at 06:36

## 2019-03-08 RX ADMIN — HEPARIN SODIUM 5000 UNIT(S): 5000 INJECTION INTRAVENOUS; SUBCUTANEOUS at 21:14

## 2019-03-08 RX ADMIN — HYDROMORPHONE HYDROCHLORIDE 1 MILLIGRAM(S): 2 INJECTION INTRAMUSCULAR; INTRAVENOUS; SUBCUTANEOUS at 00:20

## 2019-03-08 RX ADMIN — HYDROMORPHONE HYDROCHLORIDE 0.5 MILLIGRAM(S): 2 INJECTION INTRAMUSCULAR; INTRAVENOUS; SUBCUTANEOUS at 10:37

## 2019-03-08 RX ADMIN — PIPERACILLIN AND TAZOBACTAM 100 GRAM(S): 4; .5 INJECTION, POWDER, LYOPHILIZED, FOR SOLUTION INTRAVENOUS at 23:10

## 2019-03-08 RX ADMIN — PIPERACILLIN AND TAZOBACTAM 100 GRAM(S): 4; .5 INJECTION, POWDER, LYOPHILIZED, FOR SOLUTION INTRAVENOUS at 17:50

## 2019-03-08 RX ADMIN — HYDROMORPHONE HYDROCHLORIDE 1 MILLIGRAM(S): 2 INJECTION INTRAMUSCULAR; INTRAVENOUS; SUBCUTANEOUS at 10:27

## 2019-03-08 RX ADMIN — PIPERACILLIN AND TAZOBACTAM 100 GRAM(S): 4; .5 INJECTION, POWDER, LYOPHILIZED, FOR SOLUTION INTRAVENOUS at 06:33

## 2019-03-08 RX ADMIN — HYDROMORPHONE HYDROCHLORIDE 0.5 MILLIGRAM(S): 2 INJECTION INTRAMUSCULAR; INTRAVENOUS; SUBCUTANEOUS at 06:34

## 2019-03-08 RX ADMIN — HYDROMORPHONE HYDROCHLORIDE 1 MILLIGRAM(S): 2 INJECTION INTRAMUSCULAR; INTRAVENOUS; SUBCUTANEOUS at 06:37

## 2019-03-08 RX ADMIN — HYDROMORPHONE HYDROCHLORIDE 1 MILLIGRAM(S): 2 INJECTION INTRAMUSCULAR; INTRAVENOUS; SUBCUTANEOUS at 04:13

## 2019-03-08 RX ADMIN — HYDROMORPHONE HYDROCHLORIDE 1 MILLIGRAM(S): 2 INJECTION INTRAMUSCULAR; INTRAVENOUS; SUBCUTANEOUS at 09:07

## 2019-03-08 RX ADMIN — HEPARIN SODIUM 5000 UNIT(S): 5000 INJECTION INTRAVENOUS; SUBCUTANEOUS at 14:16

## 2019-03-08 RX ADMIN — HEPARIN SODIUM 5000 UNIT(S): 5000 INJECTION INTRAVENOUS; SUBCUTANEOUS at 06:36

## 2019-03-08 RX ADMIN — HYDROMORPHONE HYDROCHLORIDE 1 MILLIGRAM(S): 2 INJECTION INTRAMUSCULAR; INTRAVENOUS; SUBCUTANEOUS at 19:09

## 2019-03-08 RX ADMIN — HYDROMORPHONE HYDROCHLORIDE 1 MILLIGRAM(S): 2 INJECTION INTRAMUSCULAR; INTRAVENOUS; SUBCUTANEOUS at 18:27

## 2019-03-08 RX ADMIN — SODIUM CHLORIDE 100 MILLILITER(S): 9 INJECTION, SOLUTION INTRAVENOUS at 06:36

## 2019-03-08 RX ADMIN — HYDROMORPHONE HYDROCHLORIDE 0.5 MILLIGRAM(S): 2 INJECTION INTRAMUSCULAR; INTRAVENOUS; SUBCUTANEOUS at 02:49

## 2019-03-08 RX ADMIN — Medication 400 MILLIGRAM(S): at 21:13

## 2019-03-08 NOTE — DIETITIAN INITIAL EVALUATION ADULT. - NS AS NUTRI INTERV MEALS SNACK
General/healthful diet/Recommend advancing diet to CLD > FLD > low fiber & Soft once deemed medically feasible. Monitor for s/s intolerance.

## 2019-03-08 NOTE — DISCHARGE NOTE PROVIDER - HOSPITAL COURSE
52 yo F w/ recent prolonged admission for perforated diverticulitis, which required IR drainage, with drain remaining in place, and IV ABX discharged and was on 2/4. Representing on 3/4 after follow up appointment on 3/1 with Surgeon. She underwent CT A/P on 2/22 with interval worsening in abdominal collections. Patient has been otherwise asymptomatic, and tolerating PO intake, without N/V. Pt was admitted for worsening collections related to diverticulitis. CT A/P was ordered mildly decreased in size central pelvic abscess communicating to the sigmoid colon. The drain is in place. Right adenexal/pelvic abscess mildly in enlarged in size. 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         3/7: diag lap converted to ex lap, LOC, washout, and loop ileostomy creation        O/N: pt jaron LRD, pt did very little ambulating, she assured me she will get up tomorrow, liquid stool and gas in ostomy    3/11: Adv to CLD then LRD. tolerating diet. hgb: 8.1.    O/N: 8pm hgb 8.0, encouraged pt to ambulate, brown/black liquid stool and gas in ostomy, EMILIE: 20/40cc, right ileostomy:100/350cc    3/10: am hgb 6.4, ordered 1 unit.     O/N Tmas 99.5, tachy but exam stable, ostomy with gas and stool    3/9 garcia out, passed TOV    O/N: T max 101, remaining VSS. JEROME    3/8 ostomy nurse seen, no output , adv sips/chips    O/N: POC wnl, VSS, JEROME EMILIE 357cc    3/7: OR     O/N: drinking golytely, abx preop given, NPO @ [facility], VSS, JEROME    3/6: add on for OR tomorrow, golytely    O/N: pain controlled, good urine o/p, VSS    3/5: CT final read: IR upsizing of drain shows fistula to bowel    O/N: NPO @ [facility] for upsizing of drain with IR in AM, CT results pending, wet read shows the abcess is slightly smaller (the one above the uterus), tmax 101 @ 5:40am, tylenol/ice chief is aware    3/4: Admitted for worsening collections related to diverticulitits, CT A/P Pending 52 yo F w/ recent prolonged admission for perforated diverticulitis, which required IR drainage, with drain remaining in place, and IV ABX discharged and was on 2/4. Representing on 3/4 after follow up appointment on 3/1 with Surgeon. She underwent CT A/P on 2/22 with interval worsening in abdominal collections. Patient has been otherwise asymptomatic, and tolerating PO intake, without N/V. Pt was admitted for worsening collections related to diverticulitis. On 3/5 pt's drains were upsized by IR. CT A/P was ordered and showed mildly decreased in size central pelvic abscess communicating to the sigmoid colon. Right adenexal/pelvic abscess mildly in enlarged in size. 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 Pt was taken to OR on 3/7 for diag lap converted to ex lap, LOC, washout, and loop ileostomy creation. Her postoperative course was unremarkable with advancement of diet, passing trial of void, pain control, and bowel function return was seen in ostomy. Pt was seen by ostomy nurse throughout her stay and was informed on ostomy care. On day of discharge patient was stable to be d/c'd home.

## 2019-03-08 NOTE — CHART NOTE - NSCHARTNOTEFT_GEN_A_CORE
Upon Nutritional Assessment by the Registered Dietitian your patient was determined to meet criteria / has evidence of the following diagnosis/diagnoses:          [ ]  Mild Protein Calorie Malnutrition        [ x]  Moderate Protein Calorie Malnutrition        [ ] Severe Protein Calorie Malnutrition        [ ] Unspecified Protein Calorie Malnutrition        [ ] Underweight / BMI <19        [ ] Morbid Obesity / BMI > 40      Findings as based on:  •  Comprehensive nutrition assessment and consultation  •  Calorie counts (nutrient intake analysis)  •  Food acceptance and intake status from observations by staff  •  Follow up  •  Patient education  •  Intervention secondary to interdisciplinary rounds  •   concerns    11.1% unintentional wt loss x 1.5 mo  UBW is 135lbs, current admitted wt is 120lbs.   Suspect meeting <75% EER for >1 d/t prior hospitalization w/ prolonged NPO status/reduced intake at home.    Treatment:    The following diet has been recommended:  1) Once deemed medically feasible advance diet to Clears >> Low Fiber and Soft  2) Monitor for s/s intolerance  3) Obtain new wt to assess for further changes  4) MVI daily (chewable)    PROVIDER Section:     By signing this assessment you are acknowledging and agree with the diagnosis/diagnoses assigned by the Registered Dietitian    Comments:

## 2019-03-08 NOTE — DISCHARGE NOTE PROVIDER - NSDCCPCAREPLAN_GEN_ALL_CORE_FT
PRINCIPAL DISCHARGE DIAGNOSIS  Problem: Diverticulitis  Assessment and Plan of Treatment: Care for your ostomy nurse as instructed prior to discharge by ostomy care nurses. Prescriptions for ostomy supplies have been sent. A visiting nurse will continue to help you learn to care for your ostomy  Continue applying wet gauze to your abdominal wound. Cover with dry gauze and tape      SECONDARY DISCHARGE DIAGNOSES  Problem: Abscess  Assessment and Plan of Treatment:

## 2019-03-08 NOTE — DIETITIAN INITIAL EVALUATION ADULT. - ENERGY NEEDS
Height: 5'6" Weight: 120lbs, IBW 130lbs+/-10%, %IBW 92%, BMI 19.4  ABW used for calculations as pt between % of IBW.   Nutrient needs based on [facility] standards of care for maintenance in adults.   Needs adjusted for post-op, unintentional wt loss, and suspected prolonged inadequate intake. Recommend utilizing upper range of estimated needs.

## 2019-03-08 NOTE — DISCHARGE NOTE PROVIDER - INSTRUCTIONS
Please continue a LOW-FIBER DIET.  a) vegetables should initially be cooked (baked, steamed, blanched, etc)  b) May want to start with peeled/canned fruit  c. limit fat/oil intake and fried/greasy foods  d. add small amounts of fiber back into diet every couple of days

## 2019-03-08 NOTE — PROGRESS NOTE ADULT - ASSESSMENT
52 yo F with interval worsening collections related to diverticulitis now s/p ex lap, LOC, diverting loop ileostomy creation and abdominal washout    pain/nausea control prn  NPO/IVF  IV zosyn  EMILIE drain  garcia  dressing packing  AM labs

## 2019-03-08 NOTE — DIETITIAN INITIAL EVALUATION ADULT. - OTHER INFO
52yo F w/ interval worsening collections related to diverticulitis. Now s/p ex lap, LOC, diverting loop ileostomy creation and abdominal washout. Pt seen in room, asleep in bed but arousable to verbal stimuli. Currently NPO except ice chips/sips of water. Denies N/V. No output yet in ostomy. Seen by [facility] nurse earlier today for ostomy teaching. Written edu provided on ileostomy nutrition therapy- pt expressed wishes to review material at a later point 2/2 lethargy. Will attempt to f/u later to review written material and post-op diet for after d/c. At prior admission (2/28) pt reported UBW was 135lbs and had endorsed a 10lb wt loss PTA making her admitted wt 125lbs. At this admission, pt lost an additional 5lbs now weighing 120lbs. This indicates a net 15lb unintentional wt loss (11.1% wt change) x 1.5months. RD to follow up for further assessment when pt is more receptive. NKFA or dietary restrictions. Skin: Surgical incision; GI: ileostomy placement, abdominal pain per flowsheet. RD to follow.

## 2019-03-08 NOTE — ADVANCED PRACTICE NURSE CONSULT - RECOMMEDATIONS
Patient requires additional instruction and would benefit from home care nurse to reinforce teaching.   WOCN will continue ostomy teaching.

## 2019-03-08 NOTE — DISCHARGE NOTE PROVIDER - NSDCFUADDINST_GEN_ALL_CORE_FT
General Discharge Instructions:  *Please resume all regular home medications unless specifically advised not to take a particular medication. Also, please take any new medications as prescribed.   *Take 2 tabs tylenol every 6 hours as needed for pain management. You have also been prescribed percocet for pain not controlled by tylenol. Take 2 tabs as prescribed instead of tylenol for severe pain. Take prescribed stool softener (colace) to prevent constipation caused by percocet.  *Please get plenty of rest, continue to ambulate several times per day, and drink adequate amounts of fluids. Avoid lifting weights greater than 5-10 lbs until you follow-up with your surgeon, who will instruct you further regarding activity restrictions.  *Avoid driving or operating heavy machinery while taking pain medications.  Please follow-up with your surgeon and Primary Care Provider (PCP) as advised.  Incision Care:  *Please call your doctor or nurse practitioner if you have increased pain, swelling, redness, or drainage from the incision site.  *Avoid swimming and baths until your follow-up appointment.  *You may shower, and wash surgical incisions with a mild soap and warm water. Gently pat the area dry.  *If you have staples, they will be removed at your follow-up appointment.  *If you have steri-strips, they will fall off on their own. Please remove any remaining strips 7-10 days after surgery.    Warning Signs:  Please call your doctor or nurse practitioner if you experience the following:  *You experience new chest pain, pressure, squeezing or tightness.  *New or worsening cough, shortness of breath, or wheeze.  *If you are vomiting and cannot keep down fluids or your medications.  *You are getting dehydrated due to continued vomiting, diarrhea, or other reasons. Signs of dehydration include dry mouth, rapid heartbeat, or feeling dizzy or faint when standing.  *You see blood or dark/black material when you vomit or have a bowel movement.  *You experience burning when you urinate, have blood in your urine, or experience a discharge.  *Your pain is not improving within 8-12 hours or is not gone within 24 hours. Call or return immediately if your pain is getting worse, changes location, or moves to your chest or back.  *You have shaking chills, or fever greater than 101.5 degrees Fahrenheit or 38 degrees Celsius.  *Any change in your symptoms, or any new symptoms that concern you.    Ostomy Care:  Please follow instructions provided by the ostomy care nurse.    EMILIE Drain Care:  *Please look at the site every day for signs of infection (increased redness or pain, swelling, odor, yellow or bloody discharge, warm to touch, fever).  *Maintain suction of the bulb.  *Note color, consistency, and amount of fluid in the drain. Call the doctor, nurse practitioner, or VNA nurse if the amount increases significantly or changes in character.  *Be sure to empty the drain frequently. Record the output, if instructed to do so.  *You may shower; wash the area gently with warm, soapy water.  *Keep the insertion site clean and dry otherwise.  *Avoid swimming, baths, hot tubs; do not submerge yourself in water.  *Make sure to keep the drain attached securely to your body to prevent pulling or dislocation.    Wound Care:  *Please follow directions as shown during wound teaching.   *Allow water to wash over wound with mild water while in the shower, do not scrub. Pat the surrounding area dry.   *Wet one piece of gauze with sterile water and gently pack into wound.  *Apply 2 dry pieces of gauze on top and secure with paper tape.   *Change bandage once a day.     Follow Up:  Please follow up with Dr. Ybarra on Monday for evaluation of EMILEI drain; you may call the office to schedule appointment at your earliest convenience [phone].

## 2019-03-08 NOTE — DISCHARGE NOTE PROVIDER - CARE PROVIDER_API CALL
Anderson Ybarra)  ColonRectal Surgery; Surgery  [facility], 2nd Floor  [place], [place] [address]  Phone: [phone]  Fax: [phone]  Follow Up Time:

## 2019-03-08 NOTE — ADVANCED PRACTICE NURSE CONSULT - ASSESSMENT
Patient receptive to teaching. Stoma pink and  well budded. Ileostomy pouching system intact with yellow liquid effluent in pouch. Instructed patient on opening and closing 2 piece Fernanda pouching system. Patient demonstrated ability to open and close ostomy pouch with WOCN coaching. Also discussed frequency of emptying and changing pouching system, ileostomy diet and medications. Provided patient with Ileostomy teaching guide and ostomy supplies.

## 2019-03-08 NOTE — ADVANCED PRACTICE NURSE CONSULT - REASON FOR CONSULT
WOC nurse consult to instruct patient on ostomy care. Patient s/p ex lap, LOC, diverting loop ileostomy creation and abdominal washout.

## 2019-03-08 NOTE — PROGRESS NOTE ADULT - SUBJECTIVE AND OBJECTIVE BOX
STATUS POST:  Diagnostic laparoscopy  Exploratory laparotomy  Lysis of adhesions  Loop ileostomy      POST OPERATIVE DAY #: 1    SUBJECTIVE:   No acute events overnight.   Patient feels better, pain controlled, out of bed ambulating.  Currently NPO. Denies nausea/vomiting  Encouraged SCDs, incentive spirometry    ---------------------------------------------------------------    Vital Signs Last 24 Hrs  T(C): 37 (08 Mar 2019 05:55), Max: 37 (08 Mar 2019 05:55)  T(F): 98.6 (08 Mar 2019 05:55), Max: 98.6 (08 Mar 2019 05:55)  HR: 99 (08 Mar 2019 05:55) (89 - 114)  BP: 105/67 (08 Mar 2019 05:55) (103/67 - 133/74)  BP(mean): 87 (07 Mar 2019 21:00) (81 - 94)  RR: 17 (08 Mar 2019 05:55) (12 - 23)  SpO2: 96% (08 Mar 2019 05:55) (96% - 100%)    I&O's Summary    07 Mar 2019 07:01  -  08 Mar 2019 07:00  --------------------------------------------------------  IN: 1505 mL / OUT: 1517 mL / NET: -12 mL        ----------------------------------------------------------------    Physical Exam:    General: NAD, Comfortable in bed     Neuro: A&Ox3  Respiratory: nonlabored breathing, no respiratory distress    Abd: soft, nontender, nondistended,   midline clean dry intact, without erythema, drainage, or malodor, wet to dry dressing changed , Ostomy pink no output yet, EMILIE serosanguinous  : garcia in place draining yellow urine   Extremities: WWP, nonedematous, SCDs in place    -------------------------------------------------------------------    LABS:                        7.8    16.48 )-----------( 758      ( 08 Mar 2019 06:10 )             25.3     03-08    138  |  104  |  4<L>  ----------------------------<  127<H>  4.6   |  25  |  0.53    Ca    8.4      08 Mar 2019 06:10  Phos  4.7     03-08  Mg     1.6     03-08              RADIOLOGY & ADDITIONAL STUDIES:

## 2019-03-09 LAB
ANION GAP SERPL CALC-SCNC: 9 MMOL/L — SIGNIFICANT CHANGE UP (ref 5–17)
BUN SERPL-MCNC: 6 MG/DL — LOW (ref 7–23)
CALCIUM SERPL-MCNC: 8.2 MG/DL — LOW (ref 8.4–10.5)
CHLORIDE SERPL-SCNC: 103 MMOL/L — SIGNIFICANT CHANGE UP (ref 96–108)
CO2 SERPL-SCNC: 27 MMOL/L — SIGNIFICANT CHANGE UP (ref 22–31)
CREAT SERPL-MCNC: 0.49 MG/DL — LOW (ref 0.5–1.3)
CULTURE RESULTS: SIGNIFICANT CHANGE UP
CULTURE RESULTS: SIGNIFICANT CHANGE UP
GLUCOSE SERPL-MCNC: 102 MG/DL — HIGH (ref 70–99)
HCT VFR BLD CALC: 25.2 % — LOW (ref 34.5–45)
HGB BLD-MCNC: 7.8 G/DL — LOW (ref 11.5–15.5)
MAGNESIUM SERPL-MCNC: 1.9 MG/DL — SIGNIFICANT CHANGE UP (ref 1.6–2.6)
MCHC RBC-ENTMCNC: 27.9 PG — SIGNIFICANT CHANGE UP (ref 27–34)
MCHC RBC-ENTMCNC: 31 GM/DL — LOW (ref 32–36)
MCV RBC AUTO: 90 FL — SIGNIFICANT CHANGE UP (ref 80–100)
NRBC # BLD: 0 /100 WBCS — SIGNIFICANT CHANGE UP (ref 0–0)
PHOSPHATE SERPL-MCNC: 2.6 MG/DL — SIGNIFICANT CHANGE UP (ref 2.5–4.5)
PLATELET # BLD AUTO: 723 K/UL — HIGH (ref 150–400)
POTASSIUM SERPL-MCNC: 3.6 MMOL/L — SIGNIFICANT CHANGE UP (ref 3.5–5.3)
POTASSIUM SERPL-SCNC: 3.6 MMOL/L — SIGNIFICANT CHANGE UP (ref 3.5–5.3)
RBC # BLD: 2.8 M/UL — LOW (ref 3.8–5.2)
RBC # FLD: 15.8 % — HIGH (ref 10.3–14.5)
SODIUM SERPL-SCNC: 139 MMOL/L — SIGNIFICANT CHANGE UP (ref 135–145)
SPECIMEN SOURCE: SIGNIFICANT CHANGE UP
SPECIMEN SOURCE: SIGNIFICANT CHANGE UP
WBC # BLD: 19.88 K/UL — HIGH (ref 3.8–10.5)
WBC # FLD AUTO: 19.88 K/UL — HIGH (ref 3.8–10.5)

## 2019-03-09 RX ORDER — POTASSIUM CHLORIDE 20 MEQ
40 PACKET (EA) ORAL ONCE
Qty: 0 | Refills: 0 | Status: COMPLETED | OUTPATIENT
Start: 2019-03-09 | End: 2019-03-09

## 2019-03-09 RX ADMIN — HYDROMORPHONE HYDROCHLORIDE 1 MILLIGRAM(S): 2 INJECTION INTRAMUSCULAR; INTRAVENOUS; SUBCUTANEOUS at 06:36

## 2019-03-09 RX ADMIN — PIPERACILLIN AND TAZOBACTAM 100 GRAM(S): 4; .5 INJECTION, POWDER, LYOPHILIZED, FOR SOLUTION INTRAVENOUS at 18:54

## 2019-03-09 RX ADMIN — HYDROMORPHONE HYDROCHLORIDE 1 MILLIGRAM(S): 2 INJECTION INTRAMUSCULAR; INTRAVENOUS; SUBCUTANEOUS at 16:30

## 2019-03-09 RX ADMIN — HEPARIN SODIUM 5000 UNIT(S): 5000 INJECTION INTRAVENOUS; SUBCUTANEOUS at 05:13

## 2019-03-09 RX ADMIN — HYDROMORPHONE HYDROCHLORIDE 1 MILLIGRAM(S): 2 INJECTION INTRAMUSCULAR; INTRAVENOUS; SUBCUTANEOUS at 02:58

## 2019-03-09 RX ADMIN — HYDROMORPHONE HYDROCHLORIDE 1 MILLIGRAM(S): 2 INJECTION INTRAMUSCULAR; INTRAVENOUS; SUBCUTANEOUS at 22:42

## 2019-03-09 RX ADMIN — HYDROMORPHONE HYDROCHLORIDE 1 MILLIGRAM(S): 2 INJECTION INTRAMUSCULAR; INTRAVENOUS; SUBCUTANEOUS at 03:15

## 2019-03-09 RX ADMIN — PIPERACILLIN AND TAZOBACTAM 100 GRAM(S): 4; .5 INJECTION, POWDER, LYOPHILIZED, FOR SOLUTION INTRAVENOUS at 11:19

## 2019-03-09 RX ADMIN — HEPARIN SODIUM 5000 UNIT(S): 5000 INJECTION INTRAVENOUS; SUBCUTANEOUS at 14:31

## 2019-03-09 RX ADMIN — HYDROMORPHONE HYDROCHLORIDE 0.5 MILLIGRAM(S): 2 INJECTION INTRAMUSCULAR; INTRAVENOUS; SUBCUTANEOUS at 09:00

## 2019-03-09 RX ADMIN — HYDROMORPHONE HYDROCHLORIDE 1 MILLIGRAM(S): 2 INJECTION INTRAMUSCULAR; INTRAVENOUS; SUBCUTANEOUS at 16:39

## 2019-03-09 RX ADMIN — HYDROMORPHONE HYDROCHLORIDE 1 MILLIGRAM(S): 2 INJECTION INTRAMUSCULAR; INTRAVENOUS; SUBCUTANEOUS at 11:17

## 2019-03-09 RX ADMIN — ONDANSETRON 4 MILLIGRAM(S): 8 TABLET, FILM COATED ORAL at 15:18

## 2019-03-09 RX ADMIN — HYDROMORPHONE HYDROCHLORIDE 1 MILLIGRAM(S): 2 INJECTION INTRAMUSCULAR; INTRAVENOUS; SUBCUTANEOUS at 21:42

## 2019-03-09 RX ADMIN — PIPERACILLIN AND TAZOBACTAM 100 GRAM(S): 4; .5 INJECTION, POWDER, LYOPHILIZED, FOR SOLUTION INTRAVENOUS at 23:40

## 2019-03-09 RX ADMIN — HYDROMORPHONE HYDROCHLORIDE 0.5 MILLIGRAM(S): 2 INJECTION INTRAMUSCULAR; INTRAVENOUS; SUBCUTANEOUS at 08:46

## 2019-03-09 RX ADMIN — HYDROMORPHONE HYDROCHLORIDE 1 MILLIGRAM(S): 2 INJECTION INTRAMUSCULAR; INTRAVENOUS; SUBCUTANEOUS at 11:30

## 2019-03-09 RX ADMIN — HEPARIN SODIUM 5000 UNIT(S): 5000 INJECTION INTRAVENOUS; SUBCUTANEOUS at 21:43

## 2019-03-09 RX ADMIN — Medication 40 MILLIEQUIVALENT(S): at 10:48

## 2019-03-09 RX ADMIN — PIPERACILLIN AND TAZOBACTAM 100 GRAM(S): 4; .5 INJECTION, POWDER, LYOPHILIZED, FOR SOLUTION INTRAVENOUS at 05:12

## 2019-03-09 NOTE — PROGRESS NOTE ADULT - ASSESSMENT
54 yo F with interval worsening collections related to diverticulitis now s/p ex lap, LOC, diverting loop ileostomy creation and abdominal washout    pain/nausea control prn  NPO/IVF  IV zosyn  EMILIE drain  garcia  dressing packing  AM labs

## 2019-03-09 NOTE — PROGRESS NOTE ADULT - SUBJECTIVE AND OBJECTIVE BOX
STATUS POST:  Diagnostic laparoscopy  Exploratory laparotomy  Lysis of adhesions  Loop ileostomy      POST OPERATIVE DAY #: 2    SUBJECTIVE:   No acute events overnight.   Patient  pain controlled, out of bed ambulating.  Tolerating ice chips. Denies nausea/vomiting    ---------------------------------------------------------------    Vital Signs Last 24 Hrs  T(C): 37.9 (09 Mar 2019 08:13), Max: 38.3 (08 Mar 2019 21:00)  T(F): 100.2 (09 Mar 2019 08:13), Max: 101 (08 Mar 2019 21:00)  HR: 107 (09 Mar 2019 08:13) (96 - 115)  BP: 113/77 (09 Mar 2019 08:13) (99/65 - 117/73)  BP(mean): --  RR: 17 (09 Mar 2019 08:13) (16 - 18)  SpO2: 97% (09 Mar 2019 08:13) (95% - 97%)    I&O's Summary    08 Mar 2019 07:01  -  09 Mar 2019 07:00  --------------------------------------------------------  IN: 2400 mL / OUT: 837 mL / NET: 1563 mL        ----------------------------------------------------------------    Physical Exam:    General: NAD, Comfortable in bed     Neuro: A&Ox3  Respiratory: nonlabored breathing, no respiratory distress    Abd: soft, nontender, nondistended,  incisions clean dry intact, without erythema, drainage, or malodor , Ostomy pink with no output,  EMILIE serosanguinous  : garcia in place draining yellow urine  Extremities: WWP, nonedematous, SCDs in place    -------------------------------------------------------------------    LABS:                        7.8    19.88 )-----------( 723      ( 09 Mar 2019 06:03 )             25.2     03-09    139  |  103  |  6<L>  ----------------------------<  102<H>  3.6   |  27  |  0.49<L>    Ca    8.2<L>      09 Mar 2019 06:02  Phos  2.6     03-09  Mg     1.9     03-09              RADIOLOGY & ADDITIONAL STUDIES:

## 2019-03-10 LAB
ANION GAP SERPL CALC-SCNC: 10 MMOL/L — SIGNIFICANT CHANGE UP (ref 5–17)
BLD GP AB SCN SERPL QL: NEGATIVE — SIGNIFICANT CHANGE UP
BUN SERPL-MCNC: 5 MG/DL — LOW (ref 7–23)
CALCIUM SERPL-MCNC: 8.2 MG/DL — LOW (ref 8.4–10.5)
CHLORIDE SERPL-SCNC: 101 MMOL/L — SIGNIFICANT CHANGE UP (ref 96–108)
CO2 SERPL-SCNC: 27 MMOL/L — SIGNIFICANT CHANGE UP (ref 22–31)
CREAT SERPL-MCNC: 0.47 MG/DL — LOW (ref 0.5–1.3)
GLUCOSE SERPL-MCNC: 103 MG/DL — HIGH (ref 70–99)
HCT VFR BLD CALC: 20.7 % — CRITICAL LOW (ref 34.5–45)
HCT VFR BLD CALC: 25 % — LOW (ref 34.5–45)
HGB BLD-MCNC: 6.4 G/DL — CRITICAL LOW (ref 11.5–15.5)
HGB BLD-MCNC: 8 G/DL — LOW (ref 11.5–15.5)
MAGNESIUM SERPL-MCNC: 1.9 MG/DL — SIGNIFICANT CHANGE UP (ref 1.6–2.6)
MCHC RBC-ENTMCNC: 28.1 PG — SIGNIFICANT CHANGE UP (ref 27–34)
MCHC RBC-ENTMCNC: 28.4 PG — SIGNIFICANT CHANGE UP (ref 27–34)
MCHC RBC-ENTMCNC: 30.9 GM/DL — LOW (ref 32–36)
MCHC RBC-ENTMCNC: 32 GM/DL — SIGNIFICANT CHANGE UP (ref 32–36)
MCV RBC AUTO: 88.7 FL — SIGNIFICANT CHANGE UP (ref 80–100)
MCV RBC AUTO: 90.8 FL — SIGNIFICANT CHANGE UP (ref 80–100)
NRBC # BLD: 0 /100 WBCS — SIGNIFICANT CHANGE UP (ref 0–0)
NRBC # BLD: 0 /100 WBCS — SIGNIFICANT CHANGE UP (ref 0–0)
PHOSPHATE SERPL-MCNC: 2.3 MG/DL — LOW (ref 2.5–4.5)
PLATELET # BLD AUTO: 618 K/UL — HIGH (ref 150–400)
PLATELET # BLD AUTO: 634 K/UL — HIGH (ref 150–400)
POTASSIUM SERPL-MCNC: 4.5 MMOL/L — SIGNIFICANT CHANGE UP (ref 3.5–5.3)
POTASSIUM SERPL-SCNC: 4.5 MMOL/L — SIGNIFICANT CHANGE UP (ref 3.5–5.3)
RBC # BLD: 2.28 M/UL — LOW (ref 3.8–5.2)
RBC # BLD: 2.82 M/UL — LOW (ref 3.8–5.2)
RBC # FLD: 15.6 % — HIGH (ref 10.3–14.5)
RBC # FLD: 15.9 % — HIGH (ref 10.3–14.5)
RH IG SCN BLD-IMP: NEGATIVE — SIGNIFICANT CHANGE UP
SODIUM SERPL-SCNC: 138 MMOL/L — SIGNIFICANT CHANGE UP (ref 135–145)
WBC # BLD: 11.55 K/UL — HIGH (ref 3.8–10.5)
WBC # BLD: 15.05 K/UL — HIGH (ref 3.8–10.5)
WBC # FLD AUTO: 11.55 K/UL — HIGH (ref 3.8–10.5)
WBC # FLD AUTO: 15.05 K/UL — HIGH (ref 3.8–10.5)

## 2019-03-10 RX ORDER — HYDROMORPHONE HYDROCHLORIDE 2 MG/ML
0.5 INJECTION INTRAMUSCULAR; INTRAVENOUS; SUBCUTANEOUS ONCE
Qty: 0 | Refills: 0 | Status: DISCONTINUED | OUTPATIENT
Start: 2019-03-10 | End: 2019-03-10

## 2019-03-10 RX ADMIN — HYDROMORPHONE HYDROCHLORIDE 0.5 MILLIGRAM(S): 2 INJECTION INTRAMUSCULAR; INTRAVENOUS; SUBCUTANEOUS at 12:33

## 2019-03-10 RX ADMIN — HYDROMORPHONE HYDROCHLORIDE 1 MILLIGRAM(S): 2 INJECTION INTRAMUSCULAR; INTRAVENOUS; SUBCUTANEOUS at 16:07

## 2019-03-10 RX ADMIN — HEPARIN SODIUM 5000 UNIT(S): 5000 INJECTION INTRAVENOUS; SUBCUTANEOUS at 06:21

## 2019-03-10 RX ADMIN — HEPARIN SODIUM 5000 UNIT(S): 5000 INJECTION INTRAVENOUS; SUBCUTANEOUS at 13:16

## 2019-03-10 RX ADMIN — HYDROMORPHONE HYDROCHLORIDE 1 MILLIGRAM(S): 2 INJECTION INTRAMUSCULAR; INTRAVENOUS; SUBCUTANEOUS at 20:22

## 2019-03-10 RX ADMIN — PIPERACILLIN AND TAZOBACTAM 100 GRAM(S): 4; .5 INJECTION, POWDER, LYOPHILIZED, FOR SOLUTION INTRAVENOUS at 21:27

## 2019-03-10 RX ADMIN — HYDROMORPHONE HYDROCHLORIDE 1 MILLIGRAM(S): 2 INJECTION INTRAMUSCULAR; INTRAVENOUS; SUBCUTANEOUS at 03:54

## 2019-03-10 RX ADMIN — Medication 62.5 MILLIMOLE(S): at 16:10

## 2019-03-10 RX ADMIN — HYDROMORPHONE HYDROCHLORIDE 0.5 MILLIGRAM(S): 2 INJECTION INTRAMUSCULAR; INTRAVENOUS; SUBCUTANEOUS at 10:57

## 2019-03-10 RX ADMIN — HYDROMORPHONE HYDROCHLORIDE 0.5 MILLIGRAM(S): 2 INJECTION INTRAMUSCULAR; INTRAVENOUS; SUBCUTANEOUS at 22:24

## 2019-03-10 RX ADMIN — HYDROMORPHONE HYDROCHLORIDE 0.5 MILLIGRAM(S): 2 INJECTION INTRAMUSCULAR; INTRAVENOUS; SUBCUTANEOUS at 00:53

## 2019-03-10 RX ADMIN — HYDROMORPHONE HYDROCHLORIDE 0.5 MILLIGRAM(S): 2 INJECTION INTRAMUSCULAR; INTRAVENOUS; SUBCUTANEOUS at 01:23

## 2019-03-10 RX ADMIN — HEPARIN SODIUM 5000 UNIT(S): 5000 INJECTION INTRAVENOUS; SUBCUTANEOUS at 21:27

## 2019-03-10 RX ADMIN — SODIUM CHLORIDE 100 MILLILITER(S): 9 INJECTION, SOLUTION INTRAVENOUS at 10:00

## 2019-03-10 RX ADMIN — HYDROMORPHONE HYDROCHLORIDE 0.5 MILLIGRAM(S): 2 INJECTION INTRAMUSCULAR; INTRAVENOUS; SUBCUTANEOUS at 22:40

## 2019-03-10 RX ADMIN — PIPERACILLIN AND TAZOBACTAM 100 GRAM(S): 4; .5 INJECTION, POWDER, LYOPHILIZED, FOR SOLUTION INTRAVENOUS at 11:37

## 2019-03-10 RX ADMIN — PIPERACILLIN AND TAZOBACTAM 100 GRAM(S): 4; .5 INJECTION, POWDER, LYOPHILIZED, FOR SOLUTION INTRAVENOUS at 18:05

## 2019-03-10 RX ADMIN — HYDROMORPHONE HYDROCHLORIDE 1 MILLIGRAM(S): 2 INJECTION INTRAMUSCULAR; INTRAVENOUS; SUBCUTANEOUS at 20:37

## 2019-03-10 RX ADMIN — HYDROMORPHONE HYDROCHLORIDE 1 MILLIGRAM(S): 2 INJECTION INTRAMUSCULAR; INTRAVENOUS; SUBCUTANEOUS at 04:54

## 2019-03-10 RX ADMIN — HYDROMORPHONE HYDROCHLORIDE 0.5 MILLIGRAM(S): 2 INJECTION INTRAMUSCULAR; INTRAVENOUS; SUBCUTANEOUS at 11:15

## 2019-03-10 RX ADMIN — HYDROMORPHONE HYDROCHLORIDE 1 MILLIGRAM(S): 2 INJECTION INTRAMUSCULAR; INTRAVENOUS; SUBCUTANEOUS at 16:30

## 2019-03-10 RX ADMIN — HYDROMORPHONE HYDROCHLORIDE 0.5 MILLIGRAM(S): 2 INJECTION INTRAMUSCULAR; INTRAVENOUS; SUBCUTANEOUS at 13:00

## 2019-03-10 RX ADMIN — PIPERACILLIN AND TAZOBACTAM 100 GRAM(S): 4; .5 INJECTION, POWDER, LYOPHILIZED, FOR SOLUTION INTRAVENOUS at 06:21

## 2019-03-10 RX ADMIN — HYDROMORPHONE HYDROCHLORIDE 0.5 MILLIGRAM(S): 2 INJECTION INTRAMUSCULAR; INTRAVENOUS; SUBCUTANEOUS at 07:20

## 2019-03-10 NOTE — ADVANCED PRACTICE NURSE CONSULT - ASSESSMENT
Patient POD#3 ex lap, LOC, diverting loop ileostomy creation and abdominal washout. Stoma pink and budded, 1 3/8", peristomal skin intact, green liquid effluent noted in pouch. Educated patient on frequency of emptying and changing appliance, showering. Pt needed pain medication during teaching so will need to review teaching again tomorrow. Dressing to abdominal surgical wound dry and intact.

## 2019-03-10 NOTE — PROGRESS NOTE ADULT - ASSESSMENT
52 yo F with interval worsening collections related to diverticulitis now s/p ex lap, LOC, diverting loop ileostomy creation and abdominal washout 3/7    pain/nausea control prn  NPO/IVF  IV zosyn  EMILIE drain  dressing packing  AM labs

## 2019-03-10 NOTE — PROGRESS NOTE ADULT - SUBJECTIVE AND OBJECTIVE BOX
SUBJECTIVE: Pt seen and examined at bedside with chief. Pt denies any complaints. Pain well controlled.    MEDICATIONS  (STANDING):  heparin  Injectable 5000 Unit(s) SubCutaneous every 8 hours  lactated ringers. 1000 milliLiter(s) (100 mL/Hr) IV Continuous <Continuous>  piperacillin/tazobactam IVPB. 3.375 Gram(s) IV Intermittent every 6 hours  sodium phosphate IVPB 15 milliMole(s) IV Intermittent once    MEDICATIONS  (PRN):  HYDROmorphone  Injectable 1 milliGRAM(s) IV Push every 4 hours PRN Severe Pain (7 - 10)  HYDROmorphone  Injectable 0.5 milliGRAM(s) IV Push every 4 hours PRN Severe Pain (7 - 10)  ondansetron Injectable 4 milliGRAM(s) IV Push every 6 hours PRN Nausea      Vital Signs Last 24 Hrs  T(C): 36.8 (10 Mar 2019 05:32), Max: 37.6 (09 Mar 2019 14:34)  T(F): 98.2 (10 Mar 2019 05:32), Max: 99.6 (09 Mar 2019 14:34)  HR: 100 (10 Mar 2019 05:32) (100 - 121)  BP: 123/75 (10 Mar 2019 05:32) (114/81 - 132/80)  BP(mean): --  RR: 16 (10 Mar 2019 05:32) (16 - 17)  SpO2: 96% (10 Mar 2019 05:32) (95% - 96%)    PHYSICAL EXAM:      Constitutional: A&Ox3    Respiratory: non labored breathing, no respiratory distress    Cardiovascular: NSR, RRR    Gastrointestinal: soft ND, NT                 Incision: CDI, stoma patent w/ stool in bag    Genitourinary: voiding     Extremities: (-) edema                  I&O's Detail    09 Mar 2019 06:01  -  10 Mar 2019 07:00  --------------------------------------------------------  IN:    lactated ringers.: 1200 mL    Solution: 100 mL  Total IN: 1300 mL    OUT:    Bulb: 37 mL    Drain: 790 mL    Indwelling Catheter - Urethral: 410 mL    Voided: 630 mL  Total OUT: 1867 mL    Total NET: -567 mL          LABS:                        6.4    15.05 )-----------( 618      ( 10 Mar 2019 05:47 )             20.7     03-10    138  |  101  |  5<L>  ----------------------------<  103<H>  4.5   |  27  |  0.47<L>    Ca    8.2<L>      10 Mar 2019 05:47  Phos  2.3     03-10  Mg     1.9     03-10            RADIOLOGY & ADDITIONAL STUDIES:

## 2019-03-10 NOTE — PROGRESS NOTE ADULT - ASSESSMENT
- NPO/sips/chips  - transfuse 1unit prbc, trend CBC  - OOB, ambulate  - stoma care and teaching  - discussed with chief resident Dr. Valencia at time of surgical housestaff rounds

## 2019-03-10 NOTE — PROGRESS NOTE ADULT - SUBJECTIVE AND OBJECTIVE BOX
CRS Coverage for Dr. Ybarra  Pt seen and examined No acute events overnight  Pain well controlled  Ambulating  No nausea/vomit    Vital Signs Last 24 Hrs  T(C): 36.8 (10 Mar 2019 05:32), Max: 37.6 (09 Mar 2019 14:34)  T(F): 98.2 (10 Mar 2019 05:32), Max: 99.6 (09 Mar 2019 14:34)  HR: 100 (10 Mar 2019 05:32) (100 - 121)  BP: 123/75 (10 Mar 2019 05:32) (114/81 - 132/80)  BP(mean): --  RR: 16 (10 Mar 2019 05:32) (16 - 17)  SpO2: 96% (10 Mar 2019 05:32) (95% - 96%)    Gen NAD  Abd softly distended, stoma pink edematous with bilious output, EMILIE serosanginous, dressing C/D/I                          6.4    15.05 )-----------( 618      ( 10 Mar 2019 05:47 )             20.7     03-10    138  |  101  |  5<L>  ----------------------------<  103<H>  4.5   |  27  |  0.47<L>    Ca    8.2<L>      10 Mar 2019 05:47  Phos  2.3     03-10  Mg     1.9     03-10

## 2019-03-11 LAB
ANION GAP SERPL CALC-SCNC: 10 MMOL/L — SIGNIFICANT CHANGE UP (ref 5–17)
BUN SERPL-MCNC: 2 MG/DL — LOW (ref 7–23)
CALCIUM SERPL-MCNC: 8.5 MG/DL — SIGNIFICANT CHANGE UP (ref 8.4–10.5)
CHLORIDE SERPL-SCNC: 99 MMOL/L — SIGNIFICANT CHANGE UP (ref 96–108)
CO2 SERPL-SCNC: 29 MMOL/L — SIGNIFICANT CHANGE UP (ref 22–31)
CREAT SERPL-MCNC: 0.45 MG/DL — LOW (ref 0.5–1.3)
GLUCOSE SERPL-MCNC: 89 MG/DL — SIGNIFICANT CHANGE UP (ref 70–99)
HCT VFR BLD CALC: 26.2 % — LOW (ref 34.5–45)
HGB BLD-MCNC: 8.1 G/DL — LOW (ref 11.5–15.5)
MAGNESIUM SERPL-MCNC: 1.8 MG/DL — SIGNIFICANT CHANGE UP (ref 1.6–2.6)
MCHC RBC-ENTMCNC: 27.5 PG — SIGNIFICANT CHANGE UP (ref 27–34)
MCHC RBC-ENTMCNC: 30.9 GM/DL — LOW (ref 32–36)
MCV RBC AUTO: 88.8 FL — SIGNIFICANT CHANGE UP (ref 80–100)
NRBC # BLD: 0 /100 WBCS — SIGNIFICANT CHANGE UP (ref 0–0)
PHOSPHATE SERPL-MCNC: 2.8 MG/DL — SIGNIFICANT CHANGE UP (ref 2.5–4.5)
PLATELET # BLD AUTO: 687 K/UL — HIGH (ref 150–400)
POTASSIUM SERPL-MCNC: 3.7 MMOL/L — SIGNIFICANT CHANGE UP (ref 3.5–5.3)
POTASSIUM SERPL-SCNC: 3.7 MMOL/L — SIGNIFICANT CHANGE UP (ref 3.5–5.3)
RBC # BLD: 2.95 M/UL — LOW (ref 3.8–5.2)
RBC # FLD: 16 % — HIGH (ref 10.3–14.5)
SODIUM SERPL-SCNC: 138 MMOL/L — SIGNIFICANT CHANGE UP (ref 135–145)
WBC # BLD: 9.59 K/UL — SIGNIFICANT CHANGE UP (ref 3.8–10.5)
WBC # FLD AUTO: 9.59 K/UL — SIGNIFICANT CHANGE UP (ref 3.8–10.5)

## 2019-03-11 RX ORDER — OXYCODONE AND ACETAMINOPHEN 5; 325 MG/1; MG/1
2 TABLET ORAL EVERY 6 HOURS
Qty: 0 | Refills: 0 | Status: DISCONTINUED | OUTPATIENT
Start: 2019-03-11 | End: 2019-03-12

## 2019-03-11 RX ORDER — MAGNESIUM SULFATE 500 MG/ML
1 VIAL (ML) INJECTION ONCE
Qty: 0 | Refills: 0 | Status: COMPLETED | OUTPATIENT
Start: 2019-03-11 | End: 2019-03-11

## 2019-03-11 RX ORDER — OXYCODONE AND ACETAMINOPHEN 5; 325 MG/1; MG/1
1 TABLET ORAL EVERY 6 HOURS
Qty: 0 | Refills: 0 | Status: DISCONTINUED | OUTPATIENT
Start: 2019-03-11 | End: 2019-03-12

## 2019-03-11 RX ORDER — POTASSIUM CHLORIDE 20 MEQ
30 PACKET (EA) ORAL ONCE
Qty: 0 | Refills: 0 | Status: COMPLETED | OUTPATIENT
Start: 2019-03-11 | End: 2019-03-11

## 2019-03-11 RX ADMIN — HYDROMORPHONE HYDROCHLORIDE 1 MILLIGRAM(S): 2 INJECTION INTRAMUSCULAR; INTRAVENOUS; SUBCUTANEOUS at 10:50

## 2019-03-11 RX ADMIN — HYDROMORPHONE HYDROCHLORIDE 0.5 MILLIGRAM(S): 2 INJECTION INTRAMUSCULAR; INTRAVENOUS; SUBCUTANEOUS at 18:15

## 2019-03-11 RX ADMIN — HYDROMORPHONE HYDROCHLORIDE 0.5 MILLIGRAM(S): 2 INJECTION INTRAMUSCULAR; INTRAVENOUS; SUBCUTANEOUS at 02:58

## 2019-03-11 RX ADMIN — OXYCODONE AND ACETAMINOPHEN 2 TABLET(S): 5; 325 TABLET ORAL at 19:35

## 2019-03-11 RX ADMIN — HYDROMORPHONE HYDROCHLORIDE 1 MILLIGRAM(S): 2 INJECTION INTRAMUSCULAR; INTRAVENOUS; SUBCUTANEOUS at 16:46

## 2019-03-11 RX ADMIN — HYDROMORPHONE HYDROCHLORIDE 0.5 MILLIGRAM(S): 2 INJECTION INTRAMUSCULAR; INTRAVENOUS; SUBCUTANEOUS at 13:00

## 2019-03-11 RX ADMIN — HYDROMORPHONE HYDROCHLORIDE 0.5 MILLIGRAM(S): 2 INJECTION INTRAMUSCULAR; INTRAVENOUS; SUBCUTANEOUS at 08:00

## 2019-03-11 RX ADMIN — PIPERACILLIN AND TAZOBACTAM 100 GRAM(S): 4; .5 INJECTION, POWDER, LYOPHILIZED, FOR SOLUTION INTRAVENOUS at 05:01

## 2019-03-11 RX ADMIN — HYDROMORPHONE HYDROCHLORIDE 1 MILLIGRAM(S): 2 INJECTION INTRAMUSCULAR; INTRAVENOUS; SUBCUTANEOUS at 00:34

## 2019-03-11 RX ADMIN — SODIUM CHLORIDE 100 MILLILITER(S): 9 INJECTION, SOLUTION INTRAVENOUS at 05:01

## 2019-03-11 RX ADMIN — HYDROMORPHONE HYDROCHLORIDE 1 MILLIGRAM(S): 2 INJECTION INTRAMUSCULAR; INTRAVENOUS; SUBCUTANEOUS at 05:16

## 2019-03-11 RX ADMIN — HEPARIN SODIUM 5000 UNIT(S): 5000 INJECTION INTRAVENOUS; SUBCUTANEOUS at 13:39

## 2019-03-11 RX ADMIN — HEPARIN SODIUM 5000 UNIT(S): 5000 INJECTION INTRAVENOUS; SUBCUTANEOUS at 21:39

## 2019-03-11 RX ADMIN — HYDROMORPHONE HYDROCHLORIDE 0.5 MILLIGRAM(S): 2 INJECTION INTRAMUSCULAR; INTRAVENOUS; SUBCUTANEOUS at 17:55

## 2019-03-11 RX ADMIN — HYDROMORPHONE HYDROCHLORIDE 1 MILLIGRAM(S): 2 INJECTION INTRAMUSCULAR; INTRAVENOUS; SUBCUTANEOUS at 17:20

## 2019-03-11 RX ADMIN — HYDROMORPHONE HYDROCHLORIDE 0.5 MILLIGRAM(S): 2 INJECTION INTRAMUSCULAR; INTRAVENOUS; SUBCUTANEOUS at 02:28

## 2019-03-11 RX ADMIN — HYDROMORPHONE HYDROCHLORIDE 1 MILLIGRAM(S): 2 INJECTION INTRAMUSCULAR; INTRAVENOUS; SUBCUTANEOUS at 11:30

## 2019-03-11 RX ADMIN — HYDROMORPHONE HYDROCHLORIDE 1 MILLIGRAM(S): 2 INJECTION INTRAMUSCULAR; INTRAVENOUS; SUBCUTANEOUS at 05:01

## 2019-03-11 RX ADMIN — PIPERACILLIN AND TAZOBACTAM 100 GRAM(S): 4; .5 INJECTION, POWDER, LYOPHILIZED, FOR SOLUTION INTRAVENOUS at 23:40

## 2019-03-11 RX ADMIN — HYDROMORPHONE HYDROCHLORIDE 0.5 MILLIGRAM(S): 2 INJECTION INTRAMUSCULAR; INTRAVENOUS; SUBCUTANEOUS at 12:33

## 2019-03-11 RX ADMIN — HYDROMORPHONE HYDROCHLORIDE 1 MILLIGRAM(S): 2 INJECTION INTRAMUSCULAR; INTRAVENOUS; SUBCUTANEOUS at 00:50

## 2019-03-11 RX ADMIN — HYDROMORPHONE HYDROCHLORIDE 0.5 MILLIGRAM(S): 2 INJECTION INTRAMUSCULAR; INTRAVENOUS; SUBCUTANEOUS at 07:44

## 2019-03-11 RX ADMIN — PIPERACILLIN AND TAZOBACTAM 100 GRAM(S): 4; .5 INJECTION, POWDER, LYOPHILIZED, FOR SOLUTION INTRAVENOUS at 12:29

## 2019-03-11 RX ADMIN — HEPARIN SODIUM 5000 UNIT(S): 5000 INJECTION INTRAVENOUS; SUBCUTANEOUS at 05:01

## 2019-03-11 RX ADMIN — PIPERACILLIN AND TAZOBACTAM 100 GRAM(S): 4; .5 INJECTION, POWDER, LYOPHILIZED, FOR SOLUTION INTRAVENOUS at 17:55

## 2019-03-11 NOTE — ADVANCED PRACTICE NURSE CONSULT - ASSESSMENT
Stoma red, functioning with liquid brown effluent, distal end of stoma slightly flush. Peristomal skin intact. Patient demonstrated ability to empty and close ostomy pouch with minimal WOCN coaching, Patient participated in changing ostomy pouching system. Applied Cavilon liquid skin barrier to periwound, measured stoma,  cut skin barrier to stoma measurement, applied stoma ring around stoma, then Fernanda 2 piece 1 3/4 inch, flat, lock and roll pouching system. Ostomy supplies left at the bedside.

## 2019-03-11 NOTE — CHART NOTE - NSCHARTNOTEFT_GEN_A_CORE
Admitting Diagnosis:   Patient is a 53y old  Female who presents with a chief complaint of Diverticulitis s/p IR drainage (11 Mar 2019 07:26)      PAST MEDICAL & SURGICAL HISTORY:  Peritonitis  Diverticulitis  H/O sinus surgery      Current Nutrition Order: Low Fiber (3/11)    PO Intake: Good (%) [   ]  Fair (50-75%) [   ] Poor (<25%) [ x  ]  Encouraged small frequent meals    GI Issues:   No N/V  Output in ostomy    Pain:  Mild pain reported. Being medically managed.    Skin Integrity:  Surgical incision  Ileostomy    Labs:   03-11    138  |  99  |  2<L>  ----------------------------<  89  3.7   |  29  |  0.45<L>    Ca    8.5      11 Mar 2019 05:31  Phos  2.8     03-11  Mg     1.8     03-11      CAPILLARY BLOOD GLUCOSE          Medications:  MEDICATIONS  (STANDING):  heparin  Injectable 5000 Unit(s) SubCutaneous every 8 hours  lactated ringers. 1000 milliLiter(s) (100 mL/Hr) IV Continuous <Continuous>  piperacillin/tazobactam IVPB. 3.375 Gram(s) IV Intermittent every 6 hours    MEDICATIONS  (PRN):  HYDROmorphone  Injectable 1 milliGRAM(s) IV Push every 4 hours PRN Severe Pain (7 - 10)  HYDROmorphone  Injectable 0.5 milliGRAM(s) IV Push every 4 hours PRN Severe Pain (7 - 10)  ondansetron Injectable 4 milliGRAM(s) IV Push every 6 hours PRN Nausea      Weight: 120lbs  Height: 5'6", IBW 130lbs+/-10%, %IBW 92%, BMI 19.4  Daily     Weight Change:   11.1% unintentional wt loss x 1.5 mo  UBW is 135lbs, current admitted wt is 120lbs.   Suspect meeting <75% EER for >1 d/t prior hospitalization w/ prolonged NPO status/reduced intake at home.    Estimated energy needs:   ABW used for calculations as pt between % of IBW.   Nutrient needs based on [facility] standards of care for maintenance in adults.   Needs adjusted for post-op, unintentional wt loss, and suspected prolonged inadequate intake. Recommend utilizing upper range of estimated needs.  1360-1632kcal/day (25-30kcal/kg)  65-76g pro/day (1.2-1.4g/kg)  1360-1632ml fluid/day (25-30ml/kg)    Subjective:   54yo F w/ interval worsening collections related to diverticulitis. Now s/p ex lap, LOC, diverting loop ileostomy creation and abdominal washout (3/7). Pt seen in room, tearful, resting in bed. Diet advanced to low fiber this am. Pt having small amounts of food, minimal appetite. Denies N/V. Output in ostomy. Seen by [facility] nurse earlier today for ostomy teaching and review. Reviewed ileostomy nutrition therapy- will email pt w/ written material per pt's preference. Noted pt w/ significant wt changes- Net 15lb unintentional wt loss (11.1% wt change) x 1.5months. See malnutrition note from 3/8. Encourage soft, low fiber protein options (eggs, tuna fish, yogurt...etc.). Encourage small frequent meals and hydration. RD contact info provided for future questions/concerns. RD to follow.    Previous Nutrition Diagnosis:  Malnutrition RT inadequate kcal/pro intake over last 1-2 mo AEB 15lb unintentional wt loss x1.5mo.   Active [  x ]  Resolved [   ]    If resolved, new PES:     Goal:  Pt to consistently meet % of estimated needs PO     Recommendations:  1) Recommend advancing diet to CLD > FLD > Low fiber & Soft once deemed medically feasible.   2) Monitor for s/s intolerance  3) RD to go over ileostomy nutrition therapy edu as needed  4) MVI daily (chewable)  5) Trend weights    Education:   ileostomy nutrition therapy edu    Risk Level: High [ x  ] Moderate [   ] Low [   ]

## 2019-03-11 NOTE — PROGRESS NOTE ADULT - SUBJECTIVE AND OBJECTIVE BOX
24 hr events:  O/N: 8pm hgb 8.0, encouraged pt to ambulate, brown/black liquid stool and gas in ostomy, EMILIE: 20/40cc, right ileostomy:100/350cc  3/10: am hgb 6.4, ordered 1 unit.     SUBJECTIVE:  Pt seen and examined by chief resident. Pt is doing well, resting comfortably on bed. Pain controlled. Ambulating out of bed. No nausea or vomiting. No complaints at this time.    Vital Signs Last 24 Hrs  T(C): 37.4 (11 Mar 2019 05:54), Max: 37.6 (10 Mar 2019 16:02)  T(F): 99.3 (11 Mar 2019 05:54), Max: 99.7 (10 Mar 2019 16:02)  HR: 87 (11 Mar 2019 05:54) (87 - 105)  BP: 133/76 (11 Mar 2019 05:54) (120/82 - 135/76)  RR: 17 (11 Mar 2019 05:54) (16 - 17)  SpO2: 96% (11 Mar 2019 05:54) (95% - 98%)    Physical Exam:  General: NAD  Pulmonary: Nonlabored breathing, no respiratory distress  Abdominal: soft, NT/ND, ostomy in place with dark brown liquid stool output w/ gas, EMILIE in place with serosanguinous output, midline incision clean and dry, wet to dry dressing replaced.    Extremities: warm, well perfused. No edema      Lines/drains/tubes:  Ostomy  EMILIE    I&O's Summary    10 Mar 2019 07:01  -  11 Mar 2019 07:00  --------------------------------------------------------  IN: 500 mL / OUT: 2040 mL / NET: -1540 mL        LABS:                        8.1    9.59  )-----------( 687      ( 11 Mar 2019 05:31 )             26.2     03-11    138  |  99  |  2<L>  ----------------------------<  89  3.7   |  29  |  0.45<L>    Ca    8.5      11 Mar 2019 05:31  Phos  2.8     03-11  Mg     1.8     03-11

## 2019-03-11 NOTE — ADVANCED PRACTICE NURSE CONSULT - RECOMMEDATIONS
Patient would benefit from additional teaching and home care services to reinforce ostomy teaching.  WOCN to follow patient until discharge.

## 2019-03-11 NOTE — PROGRESS NOTE ADULT - ASSESSMENT
52 yo F with interval worsening collections related to diverticulitis now s/p ex lap, LOC, diverting loop ileostomy creation and abdominal washout    pain/nausea control prn  IVF  Adv to CLD  IV zosyn  EMILIE drain  Ostomy  dressing packing  AM labs

## 2019-03-12 ENCOUNTER — TRANSCRIPTION ENCOUNTER (OUTPATIENT)
Age: 54
End: 2019-03-12

## 2019-03-12 VITALS
SYSTOLIC BLOOD PRESSURE: 122 MMHG | RESPIRATION RATE: 17 BRPM | TEMPERATURE: 98 F | DIASTOLIC BLOOD PRESSURE: 86 MMHG | HEART RATE: 82 BPM | OXYGEN SATURATION: 98 %

## 2019-03-12 LAB
ANION GAP SERPL CALC-SCNC: 8 MMOL/L — SIGNIFICANT CHANGE UP (ref 5–17)
BUN SERPL-MCNC: <1.4 MG/DL — SIGNIFICANT CHANGE UP (ref 7–23)
CALCIUM SERPL-MCNC: 8.9 MG/DL — SIGNIFICANT CHANGE UP (ref 8.4–10.5)
CHLORIDE SERPL-SCNC: 102 MMOL/L — SIGNIFICANT CHANGE UP (ref 96–108)
CO2 SERPL-SCNC: 30 MMOL/L — SIGNIFICANT CHANGE UP (ref 22–31)
CREAT SERPL-MCNC: 0.48 MG/DL — LOW (ref 0.5–1.3)
GLUCOSE SERPL-MCNC: 123 MG/DL — HIGH (ref 70–99)
HCT VFR BLD CALC: 27.4 % — LOW (ref 34.5–45)
HGB BLD-MCNC: 8.6 G/DL — LOW (ref 11.5–15.5)
MAGNESIUM SERPL-MCNC: 1.8 MG/DL — SIGNIFICANT CHANGE UP (ref 1.6–2.6)
MCHC RBC-ENTMCNC: 28.2 PG — SIGNIFICANT CHANGE UP (ref 27–34)
MCHC RBC-ENTMCNC: 31.4 GM/DL — LOW (ref 32–36)
MCV RBC AUTO: 89.8 FL — SIGNIFICANT CHANGE UP (ref 80–100)
NRBC # BLD: 0 /100 WBCS — SIGNIFICANT CHANGE UP (ref 0–0)
PHOSPHATE SERPL-MCNC: 3.3 MG/DL — SIGNIFICANT CHANGE UP (ref 2.5–4.5)
PLATELET # BLD AUTO: 737 K/UL — HIGH (ref 150–400)
POTASSIUM SERPL-MCNC: 3.7 MMOL/L — SIGNIFICANT CHANGE UP (ref 3.5–5.3)
POTASSIUM SERPL-SCNC: 3.7 MMOL/L — SIGNIFICANT CHANGE UP (ref 3.5–5.3)
RBC # BLD: 3.05 M/UL — LOW (ref 3.8–5.2)
RBC # FLD: 16.3 % — HIGH (ref 10.3–14.5)
SODIUM SERPL-SCNC: 140 MMOL/L — SIGNIFICANT CHANGE UP (ref 135–145)
WBC # BLD: 8.43 K/UL — SIGNIFICANT CHANGE UP (ref 3.8–10.5)
WBC # FLD AUTO: 8.43 K/UL — SIGNIFICANT CHANGE UP (ref 3.8–10.5)

## 2019-03-12 PROCEDURE — 88331 PATH CONSLTJ SURG 1 BLK 1SPC: CPT

## 2019-03-12 PROCEDURE — 85610 PROTHROMBIN TIME: CPT

## 2019-03-12 PROCEDURE — 83735 ASSAY OF MAGNESIUM: CPT

## 2019-03-12 PROCEDURE — 88304 TISSUE EXAM BY PATHOLOGIST: CPT

## 2019-03-12 PROCEDURE — 81001 URINALYSIS AUTO W/SCOPE: CPT

## 2019-03-12 PROCEDURE — 75984 XRAY CONTROL CATHETER CHANGE: CPT

## 2019-03-12 PROCEDURE — 80053 COMPREHEN METABOLIC PANEL: CPT

## 2019-03-12 PROCEDURE — 86900 BLOOD TYPING SEROLOGIC ABO: CPT

## 2019-03-12 PROCEDURE — 85730 THROMBOPLASTIN TIME PARTIAL: CPT

## 2019-03-12 PROCEDURE — 99285 EMERGENCY DEPT VISIT HI MDM: CPT

## 2019-03-12 PROCEDURE — 87075 CULTR BACTERIA EXCEPT BLOOD: CPT

## 2019-03-12 PROCEDURE — 80048 BASIC METABOLIC PNL TOTAL CA: CPT

## 2019-03-12 PROCEDURE — 82962 GLUCOSE BLOOD TEST: CPT

## 2019-03-12 PROCEDURE — 99152 MOD SED SAME PHYS/QHP 5/>YRS: CPT

## 2019-03-12 PROCEDURE — 88333 PATH CONSLTJ SURG CYTO XM 1: CPT

## 2019-03-12 PROCEDURE — 85027 COMPLETE CBC AUTOMATED: CPT

## 2019-03-12 PROCEDURE — C1729: CPT

## 2019-03-12 PROCEDURE — 87040 BLOOD CULTURE FOR BACTERIA: CPT

## 2019-03-12 PROCEDURE — 49423 EXCHANGE DRAINAGE CATHETER: CPT

## 2019-03-12 PROCEDURE — P9016: CPT

## 2019-03-12 PROCEDURE — 87205 SMEAR GRAM STAIN: CPT

## 2019-03-12 PROCEDURE — 86901 BLOOD TYPING SEROLOGIC RH(D): CPT

## 2019-03-12 PROCEDURE — 87070 CULTURE OTHR SPECIMN AEROBIC: CPT

## 2019-03-12 PROCEDURE — 36430 TRANSFUSION BLD/BLD COMPNT: CPT

## 2019-03-12 PROCEDURE — 85025 COMPLETE CBC W/AUTO DIFF WBC: CPT

## 2019-03-12 PROCEDURE — 74177 CT ABD & PELVIS W/CONTRAST: CPT

## 2019-03-12 PROCEDURE — 36415 COLL VENOUS BLD VENIPUNCTURE: CPT

## 2019-03-12 PROCEDURE — C1769: CPT

## 2019-03-12 PROCEDURE — 86923 COMPATIBILITY TEST ELECTRIC: CPT

## 2019-03-12 PROCEDURE — 93005 ELECTROCARDIOGRAM TRACING: CPT

## 2019-03-12 PROCEDURE — 87086 URINE CULTURE/COLONY COUNT: CPT

## 2019-03-12 PROCEDURE — 86850 RBC ANTIBODY SCREEN: CPT

## 2019-03-12 PROCEDURE — 88305 TISSUE EXAM BY PATHOLOGIST: CPT

## 2019-03-12 PROCEDURE — 84100 ASSAY OF PHOSPHORUS: CPT

## 2019-03-12 PROCEDURE — 99153 MOD SED SAME PHYS/QHP EA: CPT

## 2019-03-12 RX ORDER — MAGNESIUM SULFATE 500 MG/ML
1 VIAL (ML) INJECTION ONCE
Qty: 0 | Refills: 0 | Status: DISCONTINUED | OUTPATIENT
Start: 2019-03-12 | End: 2019-03-12

## 2019-03-12 RX ORDER — POTASSIUM CHLORIDE 20 MEQ
30 PACKET (EA) ORAL ONCE
Qty: 0 | Refills: 0 | Status: COMPLETED | OUTPATIENT
Start: 2019-03-12 | End: 2019-03-12

## 2019-03-12 RX ORDER — DOCUSATE SODIUM 100 MG
1 CAPSULE ORAL
Qty: 60 | Refills: 0
Start: 2019-03-12 | End: 2019-04-10

## 2019-03-12 RX ADMIN — Medication 30 MILLIEQUIVALENT(S): at 11:41

## 2019-03-12 RX ADMIN — HEPARIN SODIUM 5000 UNIT(S): 5000 INJECTION INTRAVENOUS; SUBCUTANEOUS at 06:04

## 2019-03-12 RX ADMIN — OXYCODONE AND ACETAMINOPHEN 2 TABLET(S): 5; 325 TABLET ORAL at 02:50

## 2019-03-12 RX ADMIN — PIPERACILLIN AND TAZOBACTAM 100 GRAM(S): 4; .5 INJECTION, POWDER, LYOPHILIZED, FOR SOLUTION INTRAVENOUS at 11:41

## 2019-03-12 RX ADMIN — OXYCODONE AND ACETAMINOPHEN 2 TABLET(S): 5; 325 TABLET ORAL at 10:35

## 2019-03-12 RX ADMIN — OXYCODONE AND ACETAMINOPHEN 2 TABLET(S): 5; 325 TABLET ORAL at 09:09

## 2019-03-12 RX ADMIN — PIPERACILLIN AND TAZOBACTAM 100 GRAM(S): 4; .5 INJECTION, POWDER, LYOPHILIZED, FOR SOLUTION INTRAVENOUS at 06:04

## 2019-03-12 NOTE — ADVANCED PRACTICE NURSE CONSULT - ASSESSMENT
Patient participated in changing ostomy pouching system. Applied Cavilon liquid skin barrier to peristomal skin, ostomy ring around stoma, 2 piece Fernanda 1 3/4 inch, flat, cut to fit, lock and roll pouching system. Reviewed ileostomy diet with patient and s/s of stoma blockage, treatment and when to contact MD. Patient verbalized understanding. Faxed secure start enrollment from to [place]. Provided patient with ostomy supplies pending ordering by home care nurse.

## 2019-03-12 NOTE — PROGRESS NOTE ADULT - SUBJECTIVE AND OBJECTIVE BOX
24 hr events:  O/N: pt jaron LRD, pt did very little ambulating, she assured me she will get up tomorrow, liquid stool and gas in ostomy  3/11: Adv to CLD then LRD. tolerating diet. hgb: 8.1.    SUBJECTIVE:  Pt seen and examined by chief resident. Pt is doing well, resting comfortably on bed. Pain controlled. Diet tolerated. Ambulating out of bed.  No nausea or vomiting. No complaints at this time.      Vital Signs Last 24 Hrs  T(C): 36.8 (12 Mar 2019 09:09), Max: 37.4 (11 Mar 2019 13:48)  T(F): 98.2 (12 Mar 2019 09:09), Max: 99.4 (11 Mar 2019 13:48)  HR: 82 (12 Mar 2019 09:09) (82 - 96)  BP: 122/86 (12 Mar 2019 09:09) (119/71 - 135/77)  RR: 17 (12 Mar 2019 09:09) (17 - 17)  SpO2: 98% (12 Mar 2019 09:09) (96% - 98%)    Physical Exam:  General: NAD  Pulmonary: Nonlabored breathing, no respiratory distress  Abdominal: soft, NT/ND, ostomy in place with black/brown liquid ostomy output and gas, EMILIE with serosanguinous output, incision midline clean and intact, wet to dry bandage changed.   Extremities: warm, well perfused.       Lines/drains/tubes:  EMILIE   Ostomy    I&O's Summary    11 Mar 2019 07:01  -  12 Mar 2019 07:00  --------------------------------------------------------  IN: 600 mL / OUT: 1030 mL / NET: -430 mL        LABS:                        8.6    8.43  )-----------( 737      ( 12 Mar 2019 05:48 )             27.4     03-12    140  |  102  |  <1.4  ----------------------------<  123<H>  3.7   |  30  |  0.48<L>    Ca    8.9      12 Mar 2019 05:48  Phos  3.3     03-12  Mg     1.8     03-12

## 2019-03-12 NOTE — PROGRESS NOTE ADULT - ASSESSMENT
54 yo F with interval worsening collections related to diverticulitis now s/p ex lap, LOC, diverting loop ileostomy creation and abdominal washout    pain/nausea control prn  LRD/IVF  IV zosyn  EMILIE drain  Ostomy  dressing packing  AM labs  Dispo: D/C home today with VNS and EMILIE in place

## 2019-03-12 NOTE — DISCHARGE NOTE NURSING/CASE MANAGEMENT/SOCIAL WORK - NSDCDPATPORTLINK_GEN_ALL_CORE
You can access the [facility] Portal, offered by [facility], by registering with the following website: [email or web address]

## 2019-03-12 NOTE — DISCHARGE NOTE NURSING/CASE MANAGEMENT/SOCIAL WORK - NSDCPNDISPN_GEN_ALL_CORE
Side effects of pain management treatment/Activities of daily living, including home environment that might     exacerbate pain or reduce effectiveness of the pain management plan of care as well as strategies to address these issues/Education provided on the pain management plan of care/Safe use, storage and disposal of opioids when prescribed

## 2019-03-15 DIAGNOSIS — K57.00 DIVERTICULITIS OF SMALL INTESTINE WITH PERFORATION AND ABSCESS WITHOUT BLEEDING: ICD-10-CM

## 2019-03-15 DIAGNOSIS — K57.80 DIVERTICULITIS OF INTESTINE, PART UNSPECIFIED, WITH PERFORATION AND ABSCESS WITHOUT BLEEDING: ICD-10-CM

## 2019-03-15 DIAGNOSIS — E44.0 MODERATE PROTEIN-CALORIE MALNUTRITION: ICD-10-CM

## 2019-03-15 DIAGNOSIS — K66.0 PERITONEAL ADHESIONS (POSTPROCEDURAL) (POSTINFECTION): ICD-10-CM

## 2019-03-18 ENCOUNTER — APPOINTMENT (OUTPATIENT)
Dept: COLORECTAL SURGERY | Facility: CLINIC | Age: 54
End: 2019-03-18
Payer: COMMERCIAL

## 2019-03-18 VITALS
BODY MASS INDEX: 18.49 KG/M2 | WEIGHT: 111 LBS | SYSTOLIC BLOOD PRESSURE: 111 MMHG | HEIGHT: 65 IN | TEMPERATURE: 98.2 F | DIASTOLIC BLOOD PRESSURE: 59 MMHG | HEART RATE: 117 BPM

## 2019-03-18 PROCEDURE — 99024 POSTOP FOLLOW-UP VISIT: CPT

## 2019-03-22 NOTE — ASSESSMENT
[FreeTextEntry1] : Continue with local wound care.\par \par Liberalize diet.\par \par Follow up 2-3 weeks\par

## 2019-03-22 NOTE — PHYSICAL EXAM
[Abdomen Masses] : No abdominal masses [Abdomen Tenderness] : ~T No ~M abdominal tenderness [de-identified] : midline incision healing well. clean. areas with granualtion tissue. EMILIE- serosang drainage. ileostomy - functional

## 2019-03-22 NOTE — HISTORY OF PRESENT ILLNESS
[FreeTextEntry1] : 54 yo F w/ PMH perforated diverticulitis presents w/ aunt for post-op follow up for ex lap, LOC, washout, and loop ileostomy creation.\par \par Energy levels and appetite improving, although still tired\par Denies fever\par Has EMILIE drain in place, pt unsure of daily drainage quantity, continues to drain serosanguinous fluid\par VNS changing dressings daily, denies skin irritation\par \par Hx of perforated diverticulitis w/ EMILIE drain placement 1/2019\par CT A/P on 2/22 with interval worsening in abdominal collections. \par 3/5/19 Drains were upsized by IR. CT A/P was ordered and\par showed mildly decreased in size central pelvic abscess communicating to the\par sigmoid colon. 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 Mesentery implant:\par - Extensive foreign body giant cell reaction.\par \par 3. Duodenal implant:\par - Extensive foreign body giant cell reaction.\par \par \par Note: Congo red stain is negative for amyloid. The case was\par reviewed with another pathologist in the department\par (MARIAN).\par

## 2019-03-22 NOTE — CONSULT LETTER
[Dear  ___] : Dear  [unfilled], [Consult Letter:] : I had the pleasure of evaluating your patient, [unfilled]. [( Thank you for referring [unfilled] for consultation for _____ )] : Thank you for referring [unfilled] for consultation for [unfilled] [Please see my note below.] : Please see my note below. [Consult Closing:] : Thank you very much for allowing me to participate in the care of this patient.  If you have any questions, please do not hesitate to contact me. [Sincerely,] : Sincerely, [FreeTextEntry2] : JEANNETTE ADKINS\par [address] \par  [facility] [place], [place] \par  [FreeTextEntry3] : SANDRA ARANGO MD

## 2019-04-08 ENCOUNTER — APPOINTMENT (OUTPATIENT)
Dept: COLORECTAL SURGERY | Facility: CLINIC | Age: 54
End: 2019-04-08
Payer: COMMERCIAL

## 2019-04-08 VITALS
HEART RATE: 99 BPM | HEIGHT: 65 IN | TEMPERATURE: 97.5 F | BODY MASS INDEX: 19.33 KG/M2 | WEIGHT: 116 LBS | DIASTOLIC BLOOD PRESSURE: 80 MMHG | SYSTOLIC BLOOD PRESSURE: 124 MMHG

## 2019-04-08 PROCEDURE — 99024 POSTOP FOLLOW-UP VISIT: CPT

## 2019-04-08 NOTE — ASSESSMENT
[FreeTextEntry1] : She continues to recover well from surgery. Recommend followup with PCP and dermatology for complaints of hair loss. Level as diet and activity.\par \par Recommend return in 6 weeks for possible EMILIE drain removal.\par \par I reviewed with the patient my anticipated plans for future surgery would involve a 6 month waiting period before repeat exploration and sigmoid resection. The possibility of requiring a two-stage procedure was detailed-second surgery to close the ileostomy.\par \par The risks, benefits alternatives of treatment were outlined are reviewed.

## 2019-04-08 NOTE — HISTORY OF PRESENT ILLNESS
[FreeTextEntry1] : 52 y/o F presents s/p ex lap, LOC, washout, and loop ileostomy creation\par Denies abdominal pain, N, fever, or chills\par Reports several episodes of vomiting. Sometimes when changing the ostomy bag\par Reports good appetite, energy level slowly returning to normal. Has begun working vegetables back in to diet without difficulty, has not tried fruit yet\par BH: 3-4 outputs daily, watery to paste like depending on diet\par EMILIE drain still in place, minimal output, serous output \par VNS continues to comes 3 times per week \par

## 2019-04-08 NOTE — PHYSICAL EXAM
[Abdomen Masses] : No abdominal masses [Abdomen Tenderness] : ~T No ~M abdominal tenderness [de-identified] : midline incision healing well. closed. EMILIE- serosang drainage. ileostomy - functional

## 2019-05-13 ENCOUNTER — APPOINTMENT (OUTPATIENT)
Dept: COLORECTAL SURGERY | Facility: CLINIC | Age: 54
End: 2019-05-13
Payer: COMMERCIAL

## 2019-05-13 VITALS
HEIGHT: 65 IN | WEIGHT: 127 LBS | DIASTOLIC BLOOD PRESSURE: 74 MMHG | HEART RATE: 67 BPM | TEMPERATURE: 97.8 F | SYSTOLIC BLOOD PRESSURE: 122 MMHG | BODY MASS INDEX: 21.16 KG/M2

## 2019-05-13 PROCEDURE — 99024 POSTOP FOLLOW-UP VISIT: CPT

## 2019-05-13 NOTE — PHYSICAL EXAM
[Abdomen Masses] : No abdominal masses [de-identified] : midline incision healing well. closed. EMILIE- serosang drainage. ileostomy - functional  . EMILIE removed-  riskof recurrent abscess detailed [Abdomen Tenderness] : ~T No ~M abdominal tenderness

## 2019-05-13 NOTE — ASSESSMENT
[FreeTextEntry1] : DOing well.\par \par Advised follow up in 3-4 months for repeat CT scan.\par \par I anticipate 6-9 months before attempt at exploration, sigmoid colectomy .\par

## 2019-05-13 NOTE — HISTORY OF PRESENT ILLNESS
[FreeTextEntry1] : 54 y/o F presents for f/u diverticulitis\par s/p ex lap, LOC, washout, and loop ileostomy creation\par EMILIE drain in place, draining ~5-10 CC of serous drainage daily\par Denies abdominal pain, fever, chills\par Reports continued nausea and vomiting but becoming less frequent, attributes to food intake and occasionally changing stoma appliance\par Reports energy level has returned to normal, gaining weight back\par Appetite intact, slowly working fiber back in to diet without issue\par BH: emptying bag when 1/4 full, 5-8 outputs daily. H/o leakage in the past, emptying sooner to help avoid issues. watery to paste-like depending water intake \par No irritation or issues with stoma

## 2019-07-31 DIAGNOSIS — Z93.2 ILEOSTOMY STATUS: ICD-10-CM

## 2019-09-03 ENCOUNTER — FORM ENCOUNTER (OUTPATIENT)
Age: 54
End: 2019-09-03

## 2019-09-04 ENCOUNTER — OUTPATIENT (OUTPATIENT)
Dept: OUTPATIENT SERVICES | Facility: HOSPITAL | Age: 54
LOS: 1 days | End: 2019-09-04

## 2019-09-04 ENCOUNTER — APPOINTMENT (OUTPATIENT)
Dept: CT IMAGING | Facility: CLINIC | Age: 54
End: 2019-09-04
Payer: COMMERCIAL

## 2019-09-04 DIAGNOSIS — Z98.890 OTHER SPECIFIED POSTPROCEDURAL STATES: Chronic | ICD-10-CM

## 2019-09-04 PROCEDURE — 74177 CT ABD & PELVIS W/CONTRAST: CPT | Mod: 26

## 2019-11-12 ENCOUNTER — APPOINTMENT (OUTPATIENT)
Dept: INTERNAL MEDICINE | Facility: CLINIC | Age: 54
End: 2019-11-12

## 2019-11-17 NOTE — PRE-OP CHECKLIST - PATIENT'S PERSONAL PROPERTY REMOVED
Endorsed to RN to follow up with the conservator in making a decision for dialysis. non removable bracelet on RUE

## 2020-01-10 ENCOUNTER — APPOINTMENT (OUTPATIENT)
Dept: COLORECTAL SURGERY | Facility: CLINIC | Age: 55
End: 2020-01-10
Payer: COMMERCIAL

## 2020-01-10 VITALS
DIASTOLIC BLOOD PRESSURE: 84 MMHG | HEART RATE: 99 BPM | BODY MASS INDEX: 23.99 KG/M2 | SYSTOLIC BLOOD PRESSURE: 117 MMHG | HEIGHT: 65 IN | TEMPERATURE: 97.9 F | WEIGHT: 144 LBS

## 2020-01-10 DIAGNOSIS — F90.9 ATTENTION-DEFICIT HYPERACTIVITY DISORDER, UNSPECIFIED TYPE: ICD-10-CM

## 2020-01-10 PROCEDURE — 99215 OFFICE O/P EST HI 40 MIN: CPT

## 2020-01-10 RX ORDER — LISDEXAMFETAMINE DIMESYLATE 10 MG/1
10 CAPSULE ORAL
Refills: 0 | Status: ACTIVE | COMMUNITY

## 2020-01-13 NOTE — PHYSICAL EXAM
[Normal Heart Sounds] : normal heart sounds [Normal Breath Sounds] : Normal breath sounds [Abdomen Masses] : No abdominal masses [Abdomen Tenderness] : ~T No ~M abdominal tenderness [JVD] : no jugular venous distention  [de-identified] : midline incision well healed

## 2020-01-13 NOTE — ASSESSMENT
[FreeTextEntry1] : Advised CT scan to reassess resolution of post diverticular inflammation and fistula.\par \par I had extensive discussion with the patient (45 minutes) regarding the diagnosis and treatment options. I recommended that he consider proceeding with a open sigmoid resection take down of sigmoid fistula. we will leave the ileostomy in place and plan for a second procedure pending healing of the colorectal anastomosis for closure of the ileostomy.\par \par \par The associated risks, benefits, alternatives of the procedure have been outlined discussed and reviewed with the patient's family. These risks including but not limited to bleeding, infection, anastomotic leak, need for secondary surgery, need for ileostomy or colostomy creation, change in bowel habits, change in urinary function, nerve injury, change in sexual function, adjacent organ injury as well as the risk of heart and lung complications infection and death were detailed. The patient understands these risks and consents the planned procedure. Appropriate  literature regarding surgery and post operative treatment/complications and enhanced recovery pathway has been detailed and reviewed. Consent was obtained. All questions were answered.\par \par Advised cysto- ureteral stents.\par \par

## 2020-01-13 NOTE — HISTORY OF PRESENT ILLNESS
[FreeTextEntry1] : 55 yo F presents for f/u diverticulitis, s/p ex lap, LOC, washout, and loop ileostomy creation 3/7/19\par \par Last seen 5/14/19, EMILIE drain removed at time of visit. Recommended repeat CT in 3-4 months and return in 6-9 months for possible exploration, sigmoid colectomy\par Pt presents today w/o complaint\par Reports good appetite and energy levels, w/ reported weight gain (approx 10-15 lbs prior to surgery) due to higher intake of "comfort foods." Has since resumed exercise routine ([place] and spin classes 3x each/week)\par Denies skin irritation, blood in stool\par \par BH: empties 6-8 times daily whenever she sees stool. Consistency typically looser and occasionally puddinglike\par High intake of fiber and water.

## 2020-01-13 NOTE — CONSULT LETTER
[Dear  ___] : Dear  [unfilled], [Consult Letter:] : I had the pleasure of evaluating your patient, [unfilled]. [( Thank you for referring [unfilled] for consultation for _____ )] : Thank you for referring [unfilled] for consultation for [unfilled] [Please see my note below.] : Please see my note below. [Sincerely,] : Sincerely, [Consult Closing:] : Thank you very much for allowing me to participate in the care of this patient.  If you have any questions, please do not hesitate to contact me. [FreeTextEntry2] : JEANNETTE ADKINS\par [address]\par [facility] [place], [place]\par  [FreeTextEntry3] : SANDRA ARANGO MD

## 2020-01-28 NOTE — ED ADULT NURSE NOTE - CHIEF COMPLAINT
The patient is a 53y Female complaining of see chief complaint quote. Localized Dermabrasion Text: The patient was draped in routine manner.  Localized dermabrasion using 3 x 17 mm wire brush was performed in routine manner to papillary dermis. This spot dermabrasion is being performed to complete skin cancer reconstruction. It also will eliminate the other sun damaged precancerous cells that are known to be part of the regional effect of a lifetime's worth of sun exposure. This localized dermabrasion is therapeutic and should not be considered cosmetic in any regard. Localized Dermabrasion With Wire Brush Text: The patient was draped in routine manner.  Localized dermabrasion using 3 x 17 mm wire brush was performed in routine manner to papillary dermis. This spot dermabrasion is being performed to complete skin cancer reconstruction. It also will eliminate the other sun damaged precancerous cells that are known to be part of the regional effect of a lifetime's worth of sun exposure. This localized dermabrasion is therapeutic and should not be considered cosmetic in any regard.

## 2020-02-04 ENCOUNTER — FORM ENCOUNTER (OUTPATIENT)
Age: 55
End: 2020-02-04

## 2020-02-05 ENCOUNTER — APPOINTMENT (OUTPATIENT)
Dept: CT IMAGING | Facility: CLINIC | Age: 55
End: 2020-02-05
Payer: COMMERCIAL

## 2020-02-05 ENCOUNTER — OUTPATIENT (OUTPATIENT)
Dept: OUTPATIENT SERVICES | Facility: HOSPITAL | Age: 55
LOS: 1 days | End: 2020-02-05

## 2020-02-05 DIAGNOSIS — Z98.890 OTHER SPECIFIED POSTPROCEDURAL STATES: Chronic | ICD-10-CM

## 2020-02-05 PROCEDURE — 74177 CT ABD & PELVIS W/CONTRAST: CPT | Mod: 26

## 2020-02-18 NOTE — PATIENT PROFILE ADULT - IS PATIENT POST-MENOPAUSAL?
Calling patient to let him know that Dr. Paz talked to Dr. Bawadam in regards to his cough. Dr. Paz is going to put in a referral for him to see Dr. Bawadam.    Patient wasn't home I left a message on identifiable voicemail.  
no

## 2020-02-19 ENCOUNTER — TRANSCRIPTION ENCOUNTER (OUTPATIENT)
Age: 55
End: 2020-02-19

## 2020-03-04 ENCOUNTER — TRANSCRIPTION ENCOUNTER (OUTPATIENT)
Age: 55
End: 2020-03-04

## 2020-03-04 VITALS
HEART RATE: 78 BPM | WEIGHT: 144.18 LBS | OXYGEN SATURATION: 98 % | HEIGHT: 65 IN | SYSTOLIC BLOOD PRESSURE: 116 MMHG | RESPIRATION RATE: 18 BRPM | TEMPERATURE: 98 F | DIASTOLIC BLOOD PRESSURE: 72 MMHG

## 2020-03-05 ENCOUNTER — INPATIENT (INPATIENT)
Facility: HOSPITAL | Age: 55
LOS: 4 days | Discharge: HOME | DRG: 330 | End: 2020-03-10
Attending: SURGERY | Admitting: SURGERY
Payer: COMMERCIAL

## 2020-03-05 ENCOUNTER — RESULT REVIEW (OUTPATIENT)
Age: 55
End: 2020-03-05

## 2020-03-05 ENCOUNTER — APPOINTMENT (OUTPATIENT)
Dept: COLORECTAL SURGERY | Facility: HOSPITAL | Age: 55
End: 2020-03-05

## 2020-03-05 DIAGNOSIS — Z98.890 OTHER SPECIFIED POSTPROCEDURAL STATES: Chronic | ICD-10-CM

## 2020-03-05 DIAGNOSIS — Z90.49 ACQUIRED ABSENCE OF OTHER SPECIFIED PARTS OF DIGESTIVE TRACT: Chronic | ICD-10-CM

## 2020-03-05 LAB
ANION GAP SERPL CALC-SCNC: 11 MMOL/L — SIGNIFICANT CHANGE UP (ref 5–17)
BUN SERPL-MCNC: 11 MG/DL — SIGNIFICANT CHANGE UP (ref 7–23)
CALCIUM SERPL-MCNC: 9.2 MG/DL — SIGNIFICANT CHANGE UP (ref 8.4–10.5)
CHLORIDE SERPL-SCNC: 102 MMOL/L — SIGNIFICANT CHANGE UP (ref 96–108)
CO2 SERPL-SCNC: 24 MMOL/L — SIGNIFICANT CHANGE UP (ref 22–31)
CREAT SERPL-MCNC: 0.61 MG/DL — SIGNIFICANT CHANGE UP (ref 0.5–1.3)
GLUCOSE BLDC GLUCOMTR-MCNC: 111 MG/DL — HIGH (ref 70–99)
GLUCOSE SERPL-MCNC: 173 MG/DL — HIGH (ref 70–99)
HCT VFR BLD CALC: 36.8 % — SIGNIFICANT CHANGE UP (ref 34.5–45)
HGB BLD-MCNC: 12.6 G/DL — SIGNIFICANT CHANGE UP (ref 11.5–15.5)
MAGNESIUM SERPL-MCNC: 1.7 MG/DL — SIGNIFICANT CHANGE UP (ref 1.6–2.6)
MCHC RBC-ENTMCNC: 30.7 PG — SIGNIFICANT CHANGE UP (ref 27–34)
MCHC RBC-ENTMCNC: 34.2 GM/DL — SIGNIFICANT CHANGE UP (ref 32–36)
MCV RBC AUTO: 89.5 FL — SIGNIFICANT CHANGE UP (ref 80–100)
NRBC # BLD: 0 /100 WBCS — SIGNIFICANT CHANGE UP (ref 0–0)
PHOSPHATE SERPL-MCNC: 4.4 MG/DL — SIGNIFICANT CHANGE UP (ref 2.5–4.5)
PLATELET # BLD AUTO: 395 K/UL — SIGNIFICANT CHANGE UP (ref 150–400)
POTASSIUM SERPL-MCNC: 4.3 MMOL/L — SIGNIFICANT CHANGE UP (ref 3.5–5.3)
POTASSIUM SERPL-SCNC: 4.3 MMOL/L — SIGNIFICANT CHANGE UP (ref 3.5–5.3)
RBC # BLD: 4.11 M/UL — SIGNIFICANT CHANGE UP (ref 3.8–5.2)
RBC # FLD: 11.9 % — SIGNIFICANT CHANGE UP (ref 10.3–14.5)
SODIUM SERPL-SCNC: 137 MMOL/L — SIGNIFICANT CHANGE UP (ref 135–145)
WBC # BLD: 17.58 K/UL — HIGH (ref 3.8–10.5)
WBC # FLD AUTO: 17.58 K/UL — HIGH (ref 3.8–10.5)

## 2020-03-05 PROCEDURE — 44139 MOBILIZATION OF COLON: CPT | Mod: GC

## 2020-03-05 PROCEDURE — 44120 REMOVAL OF SMALL INTESTINE: CPT | Mod: GC

## 2020-03-05 PROCEDURE — 88304 TISSUE EXAM BY PATHOLOGIST: CPT | Mod: 26

## 2020-03-05 PROCEDURE — 44145 PARTIAL REMOVAL OF COLON: CPT | Mod: GC

## 2020-03-05 PROCEDURE — 45330 DIAGNOSTIC SIGMOIDOSCOPY: CPT | Mod: GC,59

## 2020-03-05 PROCEDURE — 88307 TISSUE EXAM BY PATHOLOGIST: CPT | Mod: 26

## 2020-03-05 RX ORDER — LISDEXAMFETAMINE DIMESYLATE 70 MG/1
1 CAPSULE ORAL
Qty: 0 | Refills: 0 | DISCHARGE

## 2020-03-05 RX ORDER — MAGNESIUM SULFATE 500 MG/ML
1 VIAL (ML) INJECTION ONCE
Refills: 0 | Status: COMPLETED | OUTPATIENT
Start: 2020-03-05 | End: 2020-03-05

## 2020-03-05 RX ORDER — HYDROMORPHONE HYDROCHLORIDE 2 MG/ML
0.2 INJECTION INTRAMUSCULAR; INTRAVENOUS; SUBCUTANEOUS EVERY 4 HOURS
Refills: 0 | Status: DISCONTINUED | OUTPATIENT
Start: 2020-03-05 | End: 2020-03-07

## 2020-03-05 RX ORDER — ACETAMINOPHEN 500 MG
1000 TABLET ORAL EVERY 6 HOURS
Refills: 0 | Status: DISCONTINUED | OUTPATIENT
Start: 2020-03-05 | End: 2020-03-10

## 2020-03-05 RX ORDER — ALVIMOPAN 12 MG/1
12 CAPSULE ORAL
Refills: 0 | Status: DISCONTINUED | OUTPATIENT
Start: 2020-03-05 | End: 2020-03-10

## 2020-03-05 RX ORDER — GABAPENTIN 400 MG/1
600 CAPSULE ORAL ONCE
Refills: 0 | Status: COMPLETED | OUTPATIENT
Start: 2020-03-05 | End: 2020-03-05

## 2020-03-05 RX ORDER — ALVIMOPAN 12 MG/1
12 CAPSULE ORAL ONCE
Refills: 0 | Status: COMPLETED | OUTPATIENT
Start: 2020-03-05 | End: 2020-03-05

## 2020-03-05 RX ORDER — GABAPENTIN 400 MG/1
0 CAPSULE ORAL
Qty: 0 | Refills: 0 | DISCHARGE

## 2020-03-05 RX ORDER — ACETAMINOPHEN 500 MG
1000 TABLET ORAL ONCE
Refills: 0 | Status: COMPLETED | OUTPATIENT
Start: 2020-03-05 | End: 2020-03-05

## 2020-03-05 RX ORDER — HEPARIN SODIUM 5000 [USP'U]/ML
5000 INJECTION INTRAVENOUS; SUBCUTANEOUS EVERY 8 HOURS
Refills: 0 | Status: DISCONTINUED | OUTPATIENT
Start: 2020-03-05 | End: 2020-03-10

## 2020-03-05 RX ORDER — KETOROLAC TROMETHAMINE 30 MG/ML
15 SYRINGE (ML) INJECTION EVERY 6 HOURS
Refills: 0 | Status: DISCONTINUED | OUTPATIENT
Start: 2020-03-05 | End: 2020-03-08

## 2020-03-05 RX ORDER — SODIUM CHLORIDE 9 MG/ML
1000 INJECTION, SOLUTION INTRAVENOUS
Refills: 0 | Status: DISCONTINUED | OUTPATIENT
Start: 2020-03-05 | End: 2020-03-07

## 2020-03-05 RX ORDER — HEPARIN SODIUM 5000 [USP'U]/ML
5000 INJECTION INTRAVENOUS; SUBCUTANEOUS ONCE
Refills: 0 | Status: COMPLETED | OUTPATIENT
Start: 2020-03-05 | End: 2020-03-05

## 2020-03-05 RX ADMIN — Medication 15 MILLIGRAM(S): at 18:55

## 2020-03-05 RX ADMIN — ALVIMOPAN 12 MILLIGRAM(S): 12 CAPSULE ORAL at 06:33

## 2020-03-05 RX ADMIN — HYDROMORPHONE HYDROCHLORIDE 0.2 MILLIGRAM(S): 2 INJECTION INTRAMUSCULAR; INTRAVENOUS; SUBCUTANEOUS at 15:37

## 2020-03-05 RX ADMIN — HEPARIN SODIUM 5000 UNIT(S): 5000 INJECTION INTRAVENOUS; SUBCUTANEOUS at 23:02

## 2020-03-05 RX ADMIN — HEPARIN SODIUM 5000 UNIT(S): 5000 INJECTION INTRAVENOUS; SUBCUTANEOUS at 06:33

## 2020-03-05 RX ADMIN — HYDROMORPHONE HYDROCHLORIDE 0.2 MILLIGRAM(S): 2 INJECTION INTRAMUSCULAR; INTRAVENOUS; SUBCUTANEOUS at 23:58

## 2020-03-05 RX ADMIN — Medication 1000 MILLIGRAM(S): at 06:32

## 2020-03-05 RX ADMIN — GABAPENTIN 600 MILLIGRAM(S): 400 CAPSULE ORAL at 06:33

## 2020-03-05 RX ADMIN — ALVIMOPAN 12 MILLIGRAM(S): 12 CAPSULE ORAL at 19:47

## 2020-03-05 RX ADMIN — Medication 1000 MILLIGRAM(S): at 23:02

## 2020-03-05 RX ADMIN — Medication 100 GRAM(S): at 19:47

## 2020-03-05 RX ADMIN — Medication 1000 MILLIGRAM(S): at 18:25

## 2020-03-05 RX ADMIN — HYDROMORPHONE HYDROCHLORIDE 0.2 MILLIGRAM(S): 2 INJECTION INTRAMUSCULAR; INTRAVENOUS; SUBCUTANEOUS at 20:48

## 2020-03-05 RX ADMIN — HYDROMORPHONE HYDROCHLORIDE 0.2 MILLIGRAM(S): 2 INJECTION INTRAMUSCULAR; INTRAVENOUS; SUBCUTANEOUS at 19:48

## 2020-03-05 RX ADMIN — HEPARIN SODIUM 5000 UNIT(S): 5000 INJECTION INTRAVENOUS; SUBCUTANEOUS at 14:11

## 2020-03-05 RX ADMIN — Medication 1000 MILLIGRAM(S): at 18:55

## 2020-03-05 RX ADMIN — Medication 1000 MILLIGRAM(S): at 23:59

## 2020-03-05 RX ADMIN — Medication 15 MILLIGRAM(S): at 23:02

## 2020-03-05 RX ADMIN — HYDROMORPHONE HYDROCHLORIDE 0.2 MILLIGRAM(S): 2 INJECTION INTRAMUSCULAR; INTRAVENOUS; SUBCUTANEOUS at 15:07

## 2020-03-05 RX ADMIN — Medication 15 MILLIGRAM(S): at 23:59

## 2020-03-05 RX ADMIN — Medication 15 MILLIGRAM(S): at 18:25

## 2020-03-05 RX ADMIN — SODIUM CHLORIDE 40 MILLILITER(S): 9 INJECTION, SOLUTION INTRAVENOUS at 14:20

## 2020-03-05 NOTE — H&P ADULT - NSICDXPASTMEDICALHX_GEN_ALL_CORE_FT
PAST MEDICAL HISTORY:  ADHD (attention deficit hyperactivity disorder)     Diverticulitis     Perforated small intestine     Peritonitis     Plantar fasciitis

## 2020-03-05 NOTE — BRIEF OPERATIVE NOTE - OPERATION/FINDINGS
extensive expected soft small bowel adhesions, significant inflammation and adhesions in the pelvis w/ fistula noted associated with right fallopian tube and some stool, significantly diminished caliber of colon at least into the proximal transverse colon, normal appearing loop ileostomy, negative leak test

## 2020-03-05 NOTE — PROGRESS NOTE ADULT - SUBJECTIVE AND OBJECTIVE BOX
Team 1 Surgery Post-Op Note, PCN:     Pre-Op Dx:   Procedure: Creation of colonic pouch  Intraoperative flexible sigmoidoscopy  Open colectomy involving left side of colon, open surgical removal of sigmoid colon, or open low anterior resection of rectum  Small bowel resection with anastomosis  Open lysis of abdominal adhesions  Exploratory laparotomy    Surgeon: Amada    Subjective:  Pt seen and examined at bedside 2 hours post-op. VSS. Pt is doing well, resting in bed. Pt reporting some abdominal pain. Pt denies CP, SOB, nausea, vomiting.      Vital Signs Last 24 Hrs  T(C): 36.7 (05 Mar 2020 15:00), Max: 36.7 (05 Mar 2020 13:45)  T(F): 98 (05 Mar 2020 15:00), Max: 98.1 (05 Mar 2020 13:45)  HR: 77 (05 Mar 2020 15:00) (63 - 77)  BP: 113/115 (05 Mar 2020 15:00) (113/115 - 142/65)  BP(mean): --  RR: 17 (05 Mar 2020 15:00) (14 - 18)  SpO2: 100% (05 Mar 2020 15:00) (100% - 100%)    Physical Exam:  General: NAD, resting comfortably in bed  Pulmonary: Nonlabored breathing, no respiratory distress  Cardiovascular: NSR  Abdominal: soft, mild incisional tenderness, nondistended, midline dressing over midline incision clean dry and intact, ostomy in place.   Extremities: WWP, SCDs in place  : garcia in place with right ureteral stent    CAPILLARY BLOOD GLUCOSE  POCT Blood Glucose.: 111 mg/dL (05 Mar 2020 06:49)

## 2020-03-05 NOTE — PACU DISCHARGE NOTE - COMMENTS
Plan of care endorsed to RN Rosalia Pt A&ox4, operative site clean, dry and intact; reports pain well controlled with current regimen. Plan of care endorsed to RN Rosalia

## 2020-03-05 NOTE — H&P ADULT - ASSESSMENT
54 year old female with PMH of diverticulitis s/p ex lap, LOC, washout and loop ileostomy creation on 3/7/19, presents for sigmoidectomy    Plan:  To OR, admission post op  ERAS protocol  Colon bundle

## 2020-03-05 NOTE — H&P ADULT - HISTORY OF PRESENT ILLNESS
54 year old female with PMH of diverticulitis s/p ex lap, LOC, washout and loop ileostomy creation on 3/7/19, presents for sigmoidectomy. 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 Today patient has no complaints.

## 2020-03-05 NOTE — PROGRESS NOTE ADULT - ASSESSMENT
54 year old female with PMH of diverticulitis s/p ex lap, LOC, washout and loop ileostomy creation on 3/7/19 now s/p open sigmoidectomy, LOC, SBR, creation of colonic pouch with b/l ureteral stent placement by Dr. Lee (L stent removed after case).     Pain/nausea control  ERAS  CLD/IVF  Infante w/ R ureteral stent  OOBA/IS  SQH/SCD  AM labs

## 2020-03-05 NOTE — H&P ADULT - NSHPPHYSICALEXAM_GEN_ALL_CORE
Constitutional: WDWN NAD  Heart: S1 S2  Lungs: clear to auscultation bilaterally  Abdomen: soft, nontender, nondistended, without erythema, ecchymosis, masses, ileostomy in place pink and patent with minimal fecal contents

## 2020-03-05 NOTE — BRIEF OPERATIVE NOTE - NSICDXBRIEFPROCEDURE_GEN_ALL_CORE_FT
PROCEDURES:  Creation of colonic pouch 05-Mar-2020 12:09:36  Edgardo Joaquin  Intraoperative flexible sigmoidoscopy 05-Mar-2020 12:09:14  Edgardo Joaquin  Open colectomy involving left side of colon, open surgical removal of sigmoid colon, or open low anterior resection of rectum 05-Mar-2020 12:09:07  Edgardo Joaquin  Small bowel resection with anastomosis 05-Mar-2020 12:07:02  Edgardo Joaquin  Open lysis of abdominal adhesions 05-Mar-2020 12:06:54  Edgardo Joaquin  Exploratory laparotomy 05-Mar-2020 12:06:45  Edgardo Joaquin

## 2020-03-06 ENCOUNTER — TRANSCRIPTION ENCOUNTER (OUTPATIENT)
Age: 55
End: 2020-03-06

## 2020-03-06 LAB
ANION GAP SERPL CALC-SCNC: 6 MMOL/L — SIGNIFICANT CHANGE UP (ref 5–17)
BUN SERPL-MCNC: 9 MG/DL — SIGNIFICANT CHANGE UP (ref 7–23)
CALCIUM SERPL-MCNC: 8.2 MG/DL — LOW (ref 8.4–10.5)
CHLORIDE SERPL-SCNC: 102 MMOL/L — SIGNIFICANT CHANGE UP (ref 96–108)
CO2 SERPL-SCNC: 25 MMOL/L — SIGNIFICANT CHANGE UP (ref 22–31)
CREAT SERPL-MCNC: 0.61 MG/DL — SIGNIFICANT CHANGE UP (ref 0.5–1.3)
GLUCOSE SERPL-MCNC: 126 MG/DL — HIGH (ref 70–99)
HCT VFR BLD CALC: 31.6 % — LOW (ref 34.5–45)
HCV AB S/CO SERPL IA: 0.09 S/CO — SIGNIFICANT CHANGE UP
HCV AB SERPL-IMP: SIGNIFICANT CHANGE UP
HGB BLD-MCNC: 11 G/DL — LOW (ref 11.5–15.5)
MAGNESIUM SERPL-MCNC: 2 MG/DL — SIGNIFICANT CHANGE UP (ref 1.6–2.6)
MCHC RBC-ENTMCNC: 30.9 PG — SIGNIFICANT CHANGE UP (ref 27–34)
MCHC RBC-ENTMCNC: 34.8 GM/DL — SIGNIFICANT CHANGE UP (ref 32–36)
MCV RBC AUTO: 88.8 FL — SIGNIFICANT CHANGE UP (ref 80–100)
NRBC # BLD: 0 /100 WBCS — SIGNIFICANT CHANGE UP (ref 0–0)
PHOSPHATE SERPL-MCNC: 2.8 MG/DL — SIGNIFICANT CHANGE UP (ref 2.5–4.5)
PLATELET # BLD AUTO: 332 K/UL — SIGNIFICANT CHANGE UP (ref 150–400)
POTASSIUM SERPL-MCNC: 4 MMOL/L — SIGNIFICANT CHANGE UP (ref 3.5–5.3)
POTASSIUM SERPL-SCNC: 4 MMOL/L — SIGNIFICANT CHANGE UP (ref 3.5–5.3)
RBC # BLD: 3.56 M/UL — LOW (ref 3.8–5.2)
RBC # FLD: 12.1 % — SIGNIFICANT CHANGE UP (ref 10.3–14.5)
SODIUM SERPL-SCNC: 133 MMOL/L — LOW (ref 135–145)
WBC # BLD: 15.07 K/UL — HIGH (ref 3.8–10.5)
WBC # FLD AUTO: 15.07 K/UL — HIGH (ref 3.8–10.5)

## 2020-03-06 RX ADMIN — HYDROMORPHONE HYDROCHLORIDE 0.2 MILLIGRAM(S): 2 INJECTION INTRAMUSCULAR; INTRAVENOUS; SUBCUTANEOUS at 08:02

## 2020-03-06 RX ADMIN — HYDROMORPHONE HYDROCHLORIDE 0.2 MILLIGRAM(S): 2 INJECTION INTRAMUSCULAR; INTRAVENOUS; SUBCUTANEOUS at 23:00

## 2020-03-06 RX ADMIN — HYDROMORPHONE HYDROCHLORIDE 0.2 MILLIGRAM(S): 2 INJECTION INTRAMUSCULAR; INTRAVENOUS; SUBCUTANEOUS at 04:02

## 2020-03-06 RX ADMIN — Medication 1000 MILLIGRAM(S): at 05:15

## 2020-03-06 RX ADMIN — HYDROMORPHONE HYDROCHLORIDE 0.2 MILLIGRAM(S): 2 INJECTION INTRAMUSCULAR; INTRAVENOUS; SUBCUTANEOUS at 14:21

## 2020-03-06 RX ADMIN — Medication 15 MILLIGRAM(S): at 17:48

## 2020-03-06 RX ADMIN — Medication 15 MILLIGRAM(S): at 05:15

## 2020-03-06 RX ADMIN — Medication 15 MILLIGRAM(S): at 23:54

## 2020-03-06 RX ADMIN — HYDROMORPHONE HYDROCHLORIDE 0.2 MILLIGRAM(S): 2 INJECTION INTRAMUSCULAR; INTRAVENOUS; SUBCUTANEOUS at 20:33

## 2020-03-06 RX ADMIN — Medication 1000 MILLIGRAM(S): at 18:49

## 2020-03-06 RX ADMIN — ALVIMOPAN 12 MILLIGRAM(S): 12 CAPSULE ORAL at 05:15

## 2020-03-06 RX ADMIN — HEPARIN SODIUM 5000 UNIT(S): 5000 INJECTION INTRAVENOUS; SUBCUTANEOUS at 14:21

## 2020-03-06 RX ADMIN — HYDROMORPHONE HYDROCHLORIDE 0.2 MILLIGRAM(S): 2 INJECTION INTRAMUSCULAR; INTRAVENOUS; SUBCUTANEOUS at 00:36

## 2020-03-06 RX ADMIN — Medication 15 MILLIGRAM(S): at 18:53

## 2020-03-06 RX ADMIN — Medication 15 MILLIGRAM(S): at 06:42

## 2020-03-06 RX ADMIN — HYDROMORPHONE HYDROCHLORIDE 0.2 MILLIGRAM(S): 2 INJECTION INTRAMUSCULAR; INTRAVENOUS; SUBCUTANEOUS at 18:53

## 2020-03-06 RX ADMIN — Medication 1000 MILLIGRAM(S): at 12:00

## 2020-03-06 RX ADMIN — HYDROMORPHONE HYDROCHLORIDE 0.2 MILLIGRAM(S): 2 INJECTION INTRAMUSCULAR; INTRAVENOUS; SUBCUTANEOUS at 22:30

## 2020-03-06 RX ADMIN — HYDROMORPHONE HYDROCHLORIDE 0.2 MILLIGRAM(S): 2 INJECTION INTRAMUSCULAR; INTRAVENOUS; SUBCUTANEOUS at 05:15

## 2020-03-06 RX ADMIN — Medication 15 MILLIGRAM(S): at 11:25

## 2020-03-06 RX ADMIN — Medication 1000 MILLIGRAM(S): at 17:47

## 2020-03-06 RX ADMIN — Medication 1000 MILLIGRAM(S): at 22:30

## 2020-03-06 RX ADMIN — HYDROMORPHONE HYDROCHLORIDE 0.2 MILLIGRAM(S): 2 INJECTION INTRAMUSCULAR; INTRAVENOUS; SUBCUTANEOUS at 15:00

## 2020-03-06 RX ADMIN — HYDROMORPHONE HYDROCHLORIDE 0.2 MILLIGRAM(S): 2 INJECTION INTRAMUSCULAR; INTRAVENOUS; SUBCUTANEOUS at 11:21

## 2020-03-06 RX ADMIN — Medication 1000 MILLIGRAM(S): at 22:00

## 2020-03-06 RX ADMIN — ALVIMOPAN 12 MILLIGRAM(S): 12 CAPSULE ORAL at 18:53

## 2020-03-06 RX ADMIN — HEPARIN SODIUM 5000 UNIT(S): 5000 INJECTION INTRAVENOUS; SUBCUTANEOUS at 22:00

## 2020-03-06 RX ADMIN — Medication 1000 MILLIGRAM(S): at 11:25

## 2020-03-06 RX ADMIN — Medication 1000 MILLIGRAM(S): at 06:42

## 2020-03-06 RX ADMIN — Medication 15 MILLIGRAM(S): at 12:00

## 2020-03-06 RX ADMIN — HEPARIN SODIUM 5000 UNIT(S): 5000 INJECTION INTRAVENOUS; SUBCUTANEOUS at 05:15

## 2020-03-06 NOTE — DISCHARGE NOTE PROVIDER - CARE PROVIDER_API CALL
Anderson Ybarra)  ColonRectal Surgery; Surgery  [facility], [address] [place] [address]  Phone: [phone]  Fax: [phone]  Follow Up Time: 1 week

## 2020-03-06 NOTE — PROGRESS NOTE ADULT - ASSESSMENT
54 year old female with PMH of diverticulitis s/p ex lap, LOC, washout and loop ileostomy creation on 3/7/19 now s/p open sigmoidectomy, LOC, SBR, creation of colonic pouch with b/l ureteral stent placement by Dr. Lee (L stent removed after case) 3/5 POD #1     Remove Infante and Stent   Pain/nausea control prn   CLD/IVF  OOBA/IS  SQH/SCD  AM labs

## 2020-03-06 NOTE — DISCHARGE NOTE PROVIDER - NSDCCPCAREPLAN_GEN_ALL_CORE_FT
PRINCIPAL DISCHARGE DIAGNOSIS  Diagnosis: Diverticulitis  Assessment and Plan of Treatment: PRINCIPAL DISCHARGE DIAGNOSIS  Diagnosis: Diverticulitis  Assessment and Plan of Treatment: Ostomy care nursing has been set up to help you with ostomy care. Continue to care for ostomy as instructed by inpatient and outpatient ostomy care nursing.   ****************************************************************  Follow up with Dr. Ybarra in 1-2 weeks. Call the office  [phone] to schedule your appointment. You may shower; soap and water over incision sites. Do not scrub. Pat dry when done. No tub bathing or swimming until cleared. Keep incision sites out of the sun as scars will darken. Ambulate as tolerated, but no heavy lifting (>10lbs) or strenuous exercise. You should be urinating at least 3-4x per day. Call the office if you experience increasing abdominal pain, nausea, vomiting, or temperature >101.4F.

## 2020-03-06 NOTE — DISCHARGE NOTE PROVIDER - HOSPITAL COURSE
54 year old female with PMH of diverticulitis s/p ex lap, LOC, washout and loop ileostomy creation on 3/7/19, presents for sigmoidectomy. 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 Now s/p open sigmoidectomy. LOC, SBR, creation of colonic pouch w/ b/l ureteral stent placement. Patient tolerated surgery well without complications. Stents were removed post-operatively. The ostomy had good functioning. At the time of discharge the patient was stable and functioning at baseline. 54 year old female with PMH of diverticulitis s/p ex lap, LOC, washout and loop ileostomy creation on 3/7/19, presents for sigmoidectomy. 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 Now s/p open sigmoidectomy. LOC, SBR, creation of colonic pouch w/ b/l ureteral stent placement on 3/5/20. Patient tolerated surgery well without complications. Left and Right ureteral stents were removed post-operatively. On 3/8/20, patient reported experiencing bloating, was mild to moderately distended on physical examination and AXR was done which showed demonstrate midline and staples and pelvic sutures and mild gaseous distention of some small bowel loops but no definite evidence of intestinal obstruction or ileus. Patient was made NPO, no NGT required. Patients postoperative course afterwards was unremarkable with advancement of diet, passing trial of void, pain control, with good ostomy function and return of bowel function including flatus and bowel movement. On day of discharge, patient was stable to be sent home.

## 2020-03-06 NOTE — PROGRESS NOTE ADULT - SUBJECTIVE AND OBJECTIVE BOX
SUBJECTIVE: Patient seen and examined at bedside with chief. Patient reports not sleeping well last night, states its uncomfortable being in bed. Patient tolerating diet without nausea or vomiting, did not ambulate out of bed yesterday and is having flatus, no bowel movements.   Patient denies fever, chills, chest pain, or shortness of breathe.       MEDICATIONS  (STANDING):  acetaminophen   Tablet .. 1000 milliGRAM(s) Oral every 6 hours  alvimopan 12 milliGRAM(s) Oral two times a day  heparin  Injectable 5000 Unit(s) SubCutaneous every 8 hours  ketorolac   Injectable 15 milliGRAM(s) IV Push every 6 hours  lactated ringers. 1000 milliLiter(s) (40 mL/Hr) IV Continuous <Continuous>    MEDICATIONS  (PRN):  HYDROmorphone  Injectable 0.2 milliGRAM(s) IV Push every 4 hours PRN Severe Pain (7 - 10)      Vital Signs Last 24 Hrs  T(C): 37.1 (06 Mar 2020 05:19), Max: 37.1 (06 Mar 2020 05:19)  T(F): 98.7 (06 Mar 2020 05:19), Max: 98.7 (06 Mar 2020 05:19)  HR: 91 (06 Mar 2020 05:19) (63 - 91)  BP: 103/68 (06 Mar 2020 05:19) (103/68 - 142/65)  BP(mean): --  RR: 18 (06 Mar 2020 05:19) (14 - 18)  SpO2: 97% (06 Mar 2020 05:19) (96% - 100%)    Physical Exam:  GENERAL: NAD, Resting comfortably in bed  RESP: Nonlabored breathing, No respiratory distress  CARD: Normal rate, Normal peripheral perfusion  GI: Soft, NT, ND, stoma is patent and viable with gas in bag.   EXTREM: WWP, No edema, SCDs in place    I&O's Summary    05 Mar 2020 07:01  -  06 Mar 2020 07:00  --------------------------------------------------------  IN: 1520 mL / OUT: 1275 mL / NET: 245 mL        LABS:                        12.6   17.58 )-----------( 395      ( 05 Mar 2020 18:50 )             36.8     03-05    137  |  102  |  11  ----------------------------<  173<H>  4.3   |  24  |  0.61    Ca    9.2      05 Mar 2020 18:50  Phos  4.4     03-05  Mg     1.7     03-05          CAPILLARY BLOOD GLUCOSE

## 2020-03-06 NOTE — DISCHARGE NOTE PROVIDER - NSDCMRMEDTOKEN_GEN_ALL_CORE_FT
gabapentin 100 mg oral capsule: orally 2 times a day, As Needed  Vicodin 5 mg-300 mg oral tablet: 1 tab(s) orally every 6 hours, As Needed  Vyvanse 30 mg oral capsule: 1 cap(s) orally once a day Colace 100 mg oral capsule: 1 cap(s) orally 2 times a day, As Needed for constipation MDD:2 tabs   gabapentin 100 mg oral capsule: orally 2 times a day, As Needed  Percocet 5/325 oral tablet: 1 tab(s) orally every 6 hours, As Needed -for severe pain MDD:4 tabs   Vicodin 5 mg-300 mg oral tablet: 1 tab(s) orally every 6 hours, As Needed  Vyvanse 30 mg oral capsule: 1 cap(s) orally once a day

## 2020-03-06 NOTE — DISCHARGE NOTE PROVIDER - NSDCFUADDINST_GEN_ALL_CORE_FT
-Please take Colace (Docusate) 100 mg twice a day. Please take Percocet if needed for pain.  -Please follow a low fiber diet: Vegetables should initially be cooked (backed, steamed, blanched, etc.). May want to start with peeled and/or canned fruit. Limit fat/oil intake and fried/greasy foods. Add small amounts of fiber back in to the diet every couple days  -Call the office to schedule an appointment 2 weeks after surgery. The office number is listed below.

## 2020-03-06 NOTE — DISCHARGE NOTE PROVIDER - NSDCCPTREATMENT_GEN_ALL_CORE_FT
PRINCIPAL PROCEDURE  Procedure: Intraoperative flexible sigmoidoscopy  Findings and Treatment:       SECONDARY PROCEDURE  Procedure: Small bowel resection with anastomosis  Findings and Treatment:     Procedure: Small bowel resection with anastomosis  Findings and Treatment:     Procedure: Open colectomy involving left side of colon, open surgical removal of sigmoid colon, or open low anterior resection of rectum  Findings and Treatment:     Procedure: Open colectomy involving left side of colon, open surgical removal of sigmoid colon, or open low anterior resection of rectum  Findings and Treatment:     Procedure: Creation of colonic pouch  Findings and Treatment: PRINCIPAL PROCEDURE  Procedure: Intraoperative flexible sigmoidoscopy  Findings and Treatment: Please continue a LOW-FIBER DIET. Listed below are some foods you may eat and those you should avoid.   --Allowed foods:  White bread without nuts and seeds  White rice, plain white pasta, and crackers  Refined hot cereals, such as Cream of Wheat, or cold cereals with less than 1 gram of fiber per serving  Pancakes or waffles made from white refined flour  Most canned or well-cooked vegetables and fruits without skins or seeds  Fruit and vegetable juice with little or no pulp, fruit-flavored drinks, and flavored [place]  Tender meat, poultry, fish, eggs and tofu  Milk and foods made from milk — such as yogurt, pudding, ice cream, cheeses and sour cream — if tolerated  Butter, margarine, oils and salad dressings without seeds  --Foods to avoid:  Whole-wheat or whole-grain breads, cereals and pasta  Brown or wild rice and other whole grains, such as oats, kasha, barley and quinoa  Dried fruits and prune juice  Raw fruit, including those with seeds, skin or membranes, such as berries  Raw or undercooked vegetables, including corn  Dried beans, peas and lentils  Seeds and nuts and foods containing them, including peanut butter and other nut butters  Coconut  Popcorn      SECONDARY PROCEDURE  Procedure: Open colectomy involving left side of colon, open surgical removal of sigmoid colon, or open low anterior resection of rectum  Findings and Treatment:     Procedure: Small bowel resection with anastomosis  Findings and Treatment:     Procedure: Small bowel resection with anastomosis  Findings and Treatment:     Procedure: Open colectomy involving left side of colon, open surgical removal of sigmoid colon, or open low anterior resection of rectum  Findings and Treatment:     Procedure: Creation of colonic pouch  Findings and Treatment:

## 2020-03-07 LAB
ANION GAP SERPL CALC-SCNC: 9 MMOL/L — SIGNIFICANT CHANGE UP (ref 5–17)
BUN SERPL-MCNC: 5 MG/DL — LOW (ref 7–23)
CALCIUM SERPL-MCNC: 8.8 MG/DL — SIGNIFICANT CHANGE UP (ref 8.4–10.5)
CHLORIDE SERPL-SCNC: 101 MMOL/L — SIGNIFICANT CHANGE UP (ref 96–108)
CO2 SERPL-SCNC: 27 MMOL/L — SIGNIFICANT CHANGE UP (ref 22–31)
CREAT SERPL-MCNC: 0.5 MG/DL — SIGNIFICANT CHANGE UP (ref 0.5–1.3)
GLUCOSE SERPL-MCNC: 127 MG/DL — HIGH (ref 70–99)
HCT VFR BLD CALC: 31 % — LOW (ref 34.5–45)
HGB BLD-MCNC: 10.5 G/DL — LOW (ref 11.5–15.5)
MAGNESIUM SERPL-MCNC: 1.9 MG/DL — SIGNIFICANT CHANGE UP (ref 1.6–2.6)
MCHC RBC-ENTMCNC: 30.6 PG — SIGNIFICANT CHANGE UP (ref 27–34)
MCHC RBC-ENTMCNC: 33.9 GM/DL — SIGNIFICANT CHANGE UP (ref 32–36)
MCV RBC AUTO: 90.4 FL — SIGNIFICANT CHANGE UP (ref 80–100)
NRBC # BLD: 0 /100 WBCS — SIGNIFICANT CHANGE UP (ref 0–0)
PHOSPHATE SERPL-MCNC: 1.9 MG/DL — LOW (ref 2.5–4.5)
PLATELET # BLD AUTO: 313 K/UL — SIGNIFICANT CHANGE UP (ref 150–400)
POTASSIUM SERPL-MCNC: 3.6 MMOL/L — SIGNIFICANT CHANGE UP (ref 3.5–5.3)
POTASSIUM SERPL-SCNC: 3.6 MMOL/L — SIGNIFICANT CHANGE UP (ref 3.5–5.3)
RBC # BLD: 3.43 M/UL — LOW (ref 3.8–5.2)
RBC # FLD: 12.1 % — SIGNIFICANT CHANGE UP (ref 10.3–14.5)
SODIUM SERPL-SCNC: 137 MMOL/L — SIGNIFICANT CHANGE UP (ref 135–145)
WBC # BLD: 15.87 K/UL — HIGH (ref 3.8–10.5)
WBC # FLD AUTO: 15.87 K/UL — HIGH (ref 3.8–10.5)

## 2020-03-07 RX ORDER — POTASSIUM CHLORIDE 20 MEQ
40 PACKET (EA) ORAL ONCE
Refills: 0 | Status: COMPLETED | OUTPATIENT
Start: 2020-03-07 | End: 2020-03-07

## 2020-03-07 RX ORDER — SODIUM CHLORIDE 9 MG/ML
1000 INJECTION, SOLUTION INTRAVENOUS
Refills: 0 | Status: DISCONTINUED | OUTPATIENT
Start: 2020-03-07 | End: 2020-03-08

## 2020-03-07 RX ADMIN — Medication 15 MILLIGRAM(S): at 06:00

## 2020-03-07 RX ADMIN — Medication 1000 MILLIGRAM(S): at 11:30

## 2020-03-07 RX ADMIN — Medication 15 MILLIGRAM(S): at 17:47

## 2020-03-07 RX ADMIN — Medication 1000 MILLIGRAM(S): at 05:51

## 2020-03-07 RX ADMIN — Medication 15 MILLIGRAM(S): at 12:35

## 2020-03-07 RX ADMIN — ALVIMOPAN 12 MILLIGRAM(S): 12 CAPSULE ORAL at 17:47

## 2020-03-07 RX ADMIN — Medication 1000 MILLIGRAM(S): at 15:34

## 2020-03-07 RX ADMIN — Medication 15 MILLIGRAM(S): at 23:47

## 2020-03-07 RX ADMIN — HEPARIN SODIUM 5000 UNIT(S): 5000 INJECTION INTRAVENOUS; SUBCUTANEOUS at 21:31

## 2020-03-07 RX ADMIN — Medication 1000 MILLIGRAM(S): at 21:31

## 2020-03-07 RX ADMIN — Medication 15 MILLIGRAM(S): at 18:20

## 2020-03-07 RX ADMIN — Medication 1000 MILLIGRAM(S): at 22:25

## 2020-03-07 RX ADMIN — SODIUM CHLORIDE 80 MILLILITER(S): 9 INJECTION, SOLUTION INTRAVENOUS at 12:35

## 2020-03-07 RX ADMIN — Medication 1000 MILLIGRAM(S): at 15:30

## 2020-03-07 RX ADMIN — ALVIMOPAN 12 MILLIGRAM(S): 12 CAPSULE ORAL at 05:51

## 2020-03-07 RX ADMIN — Medication 84.5 MILLIMOLE(S): at 15:30

## 2020-03-07 RX ADMIN — Medication 1000 MILLIGRAM(S): at 04:50

## 2020-03-07 RX ADMIN — HEPARIN SODIUM 5000 UNIT(S): 5000 INJECTION INTRAVENOUS; SUBCUTANEOUS at 06:00

## 2020-03-07 RX ADMIN — HEPARIN SODIUM 5000 UNIT(S): 5000 INJECTION INTRAVENOUS; SUBCUTANEOUS at 15:30

## 2020-03-07 RX ADMIN — Medication 40 MILLIEQUIVALENT(S): at 10:59

## 2020-03-07 RX ADMIN — Medication 1000 MILLIGRAM(S): at 10:59

## 2020-03-07 RX ADMIN — Medication 15 MILLIGRAM(S): at 13:00

## 2020-03-07 RX ADMIN — Medication 15 MILLIGRAM(S): at 00:00

## 2020-03-07 NOTE — PROGRESS NOTE ADULT - ASSESSMENT
54 year old female with PMH of diverticulitis s/p ex lap, LOC, washout and loop ileostomy creation on 3/7/19 now s/p open sigmoidectomy, LOC, SBR, creation of colonic pouch with b/l ureteral stent placement by Dr. Lee (L stent removed after case) (3/5). Clinically stable.    Pain/nausea control prn   CLD/IVF  OOBA/IS  SQH/SCD  AM labs

## 2020-03-07 NOTE — PROGRESS NOTE ADULT - SUBJECTIVE AND OBJECTIVE BOX
STATUS POST:  POD # 2 s/p open sigmoidectomy, LOC, SBR, creation of colonic pouch     INTERVAL HPI/OVERNIGHT EVENTS: Infante dc'd, uretral Stent removed, passed TOV      SUBJECTIVE: She states " I feel bloated". Pain is well controlled. Denies nausea, emesis. No acute complaints    heparin  Injectable 5000 Unit(s) SubCutaneous every 8 hours      Vital Signs Last 24 Hrs  T(C): 36.6 (07 Mar 2020 05:52), Max: 36.9 (06 Mar 2020 14:17)  T(F): 97.9 (07 Mar 2020 05:52), Max: 98.5 (06 Mar 2020 14:17)  HR: 108 (07 Mar 2020 05:52) (75 - 108)  BP: 30/85 (07 Mar 2020 05:52) (30/85 - 124/75)  BP(mean): --  RR: 17 (07 Mar 2020 05:52) (14 - 19)  SpO2: 97% (07 Mar 2020 05:52) (95% - 97%)  I&O's Detail    06 Mar 2020 07:01  -  07 Mar 2020 07:00  --------------------------------------------------------  IN:    lactated ringers.: 720 mL    Oral Fluid: 1105 mL  Total IN: 1825 mL    OUT:    Ileostomy: 700 mL    Voided: 2200 mL  Total OUT: 2900 mL    Total NET: -1075 mL        Physical Exam:  General: NAD, resting comfortably in bed  Pulmonary: Nonlabored breathing, no respiratory distress  Cardiovascular: NSR  Abdominal: soft, mildly distended, + BS, Midline incision c/d/i, ileostomy w/ liquid and solid stool  Extremities: WWP, normal strength          LABS:                        10.5   15.87 )-----------( 313      ( 07 Mar 2020 06:27 )             31.0     03-07    137  |  101  |  5<L>  ----------------------------<  127<H>  3.6   |  27  |  0.50    Ca    8.8      07 Mar 2020 06:27  Phos  1.9     03-07  Mg     1.9     03-07            RADIOLOGY & ADDITIONAL STUDIES: STATUS POST:  POD # 2 s/p open sigmoidectomy, LOC, SBR, creation of colonic pouch     INTERVAL HPI/OVERNIGHT EVENTS: Infante dc'd, uretral Stent removed, passed TOV      SUBJECTIVE: She states " I feel bloated". Pain is well controlled. Denies nausea, emesis. No acute complaints    heparin  Injectable 5000 Unit(s) SubCutaneous every 8 hours      Vital Signs Last 24 Hrs  T(C): 36.6 (07 Mar 2020 05:52), Max: 36.9 (06 Mar 2020 14:17)  T(F): 97.9 (07 Mar 2020 05:52), Max: 98.5 (06 Mar 2020 14:17)  HR: 108 (07 Mar 2020 05:52) (75 - 108)  BP: 30/85 (07 Mar 2020 05:52) (30/85 - 124/75)  BP(mean): --  RR: 17 (07 Mar 2020 05:52) (14 - 19)  SpO2: 97% (07 Mar 2020 05:52) (95% - 97%)  I&O's Detail    06 Mar 2020 07:01  -  07 Mar 2020 07:00  --------------------------------------------------------  IN:    lactated ringers.: 720 mL    Oral Fluid: 1105 mL  Total IN: 1825 mL    OUT:    Ileostomy: 700 mL    Voided: 2200 mL  Total OUT: 2900 mL    Total NET: -1075 mL        Physical Exam:  General: NAD, resting comfortably in bed  Pulmonary: Nonlabored breathing, no respiratory distress  Cardiovascular: NSR  Abdominal: soft, mildly distended, + BS, Midline incision c/d/i, ileostomy w/ liquid and solid stool  Extremities: WWP, normal strength          LABS:                        10.5   15.87 )-----------( 313      ( 07 Mar 2020 06:27 )             31.0     03-07    137  |  101  |  5<L>  ----------------------------<  127<H>  3.6   |  27  |  0.50    Ca    8.8      07 Mar 2020 06:27  Phos  1.9     03-07  Mg     1.9     03-07            RADIOLOGY & ADDITIONAL STUDIES:  < from: CT Abdomen and Pelvis w/ Oral Cont and w/ IV Cont (02.05.20 @ 10:10) >  Impression:   1.  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    < end of copied text >

## 2020-03-08 LAB
ANION GAP SERPL CALC-SCNC: 10 MMOL/L — SIGNIFICANT CHANGE UP (ref 5–17)
BUN SERPL-MCNC: 6 MG/DL — LOW (ref 7–23)
CALCIUM SERPL-MCNC: 8.8 MG/DL — SIGNIFICANT CHANGE UP (ref 8.4–10.5)
CHLORIDE SERPL-SCNC: 106 MMOL/L — SIGNIFICANT CHANGE UP (ref 96–108)
CO2 SERPL-SCNC: 25 MMOL/L — SIGNIFICANT CHANGE UP (ref 22–31)
CREAT SERPL-MCNC: 0.53 MG/DL — SIGNIFICANT CHANGE UP (ref 0.5–1.3)
GLUCOSE SERPL-MCNC: 115 MG/DL — HIGH (ref 70–99)
HCT VFR BLD CALC: 28.8 % — LOW (ref 34.5–45)
HGB BLD-MCNC: 9.6 G/DL — LOW (ref 11.5–15.5)
MAGNESIUM SERPL-MCNC: 1.8 MG/DL — SIGNIFICANT CHANGE UP (ref 1.6–2.6)
MCHC RBC-ENTMCNC: 30.4 PG — SIGNIFICANT CHANGE UP (ref 27–34)
MCHC RBC-ENTMCNC: 33.3 GM/DL — SIGNIFICANT CHANGE UP (ref 32–36)
MCV RBC AUTO: 91.1 FL — SIGNIFICANT CHANGE UP (ref 80–100)
NRBC # BLD: 0 /100 WBCS — SIGNIFICANT CHANGE UP (ref 0–0)
PHOSPHATE SERPL-MCNC: 2.9 MG/DL — SIGNIFICANT CHANGE UP (ref 2.5–4.5)
PLATELET # BLD AUTO: 302 K/UL — SIGNIFICANT CHANGE UP (ref 150–400)
POTASSIUM SERPL-MCNC: 3.6 MMOL/L — SIGNIFICANT CHANGE UP (ref 3.5–5.3)
POTASSIUM SERPL-SCNC: 3.6 MMOL/L — SIGNIFICANT CHANGE UP (ref 3.5–5.3)
RBC # BLD: 3.16 M/UL — LOW (ref 3.8–5.2)
RBC # FLD: 12 % — SIGNIFICANT CHANGE UP (ref 10.3–14.5)
SODIUM SERPL-SCNC: 141 MMOL/L — SIGNIFICANT CHANGE UP (ref 135–145)
WBC # BLD: 10.93 K/UL — HIGH (ref 3.8–10.5)
WBC # FLD AUTO: 10.93 K/UL — HIGH (ref 3.8–10.5)

## 2020-03-08 PROCEDURE — 74019 RADEX ABDOMEN 2 VIEWS: CPT | Mod: 26

## 2020-03-08 RX ORDER — KETOROLAC TROMETHAMINE 30 MG/ML
15 SYRINGE (ML) INJECTION EVERY 6 HOURS
Refills: 0 | Status: COMPLETED | OUTPATIENT
Start: 2020-03-08 | End: 2020-03-10

## 2020-03-08 RX ORDER — POTASSIUM CHLORIDE 20 MEQ
10 PACKET (EA) ORAL
Refills: 0 | Status: COMPLETED | OUTPATIENT
Start: 2020-03-08 | End: 2020-03-08

## 2020-03-08 RX ORDER — SODIUM CHLORIDE 9 MG/ML
1000 INJECTION, SOLUTION INTRAVENOUS
Refills: 0 | Status: DISCONTINUED | OUTPATIENT
Start: 2020-03-08 | End: 2020-03-10

## 2020-03-08 RX ORDER — SODIUM CHLORIDE 9 MG/ML
1000 INJECTION, SOLUTION INTRAVENOUS
Refills: 0 | Status: DISCONTINUED | OUTPATIENT
Start: 2020-03-08 | End: 2020-03-08

## 2020-03-08 RX ADMIN — Medication 15 MILLIGRAM(S): at 11:45

## 2020-03-08 RX ADMIN — Medication 15 MILLIGRAM(S): at 19:32

## 2020-03-08 RX ADMIN — Medication 15 MILLIGRAM(S): at 20:01

## 2020-03-08 RX ADMIN — SODIUM CHLORIDE 100 MILLILITER(S): 9 INJECTION, SOLUTION INTRAVENOUS at 11:17

## 2020-03-08 RX ADMIN — Medication 1000 MILLIGRAM(S): at 15:00

## 2020-03-08 RX ADMIN — ALVIMOPAN 12 MILLIGRAM(S): 12 CAPSULE ORAL at 05:14

## 2020-03-08 RX ADMIN — Medication 100 MILLIEQUIVALENT(S): at 15:03

## 2020-03-08 RX ADMIN — Medication 1000 MILLIGRAM(S): at 22:50

## 2020-03-08 RX ADMIN — HEPARIN SODIUM 5000 UNIT(S): 5000 INJECTION INTRAVENOUS; SUBCUTANEOUS at 15:03

## 2020-03-08 RX ADMIN — Medication 1000 MILLIGRAM(S): at 05:15

## 2020-03-08 RX ADMIN — Medication 100 MILLIEQUIVALENT(S): at 11:17

## 2020-03-08 RX ADMIN — Medication 15 MILLIGRAM(S): at 11:16

## 2020-03-08 RX ADMIN — HEPARIN SODIUM 5000 UNIT(S): 5000 INJECTION INTRAVENOUS; SUBCUTANEOUS at 21:49

## 2020-03-08 RX ADMIN — Medication 15 MILLIGRAM(S): at 05:15

## 2020-03-08 RX ADMIN — HEPARIN SODIUM 5000 UNIT(S): 5000 INJECTION INTRAVENOUS; SUBCUTANEOUS at 05:14

## 2020-03-08 RX ADMIN — Medication 15 MILLIGRAM(S): at 00:08

## 2020-03-08 RX ADMIN — Medication 1000 MILLIGRAM(S): at 04:11

## 2020-03-08 RX ADMIN — ALVIMOPAN 12 MILLIGRAM(S): 12 CAPSULE ORAL at 18:10

## 2020-03-08 RX ADMIN — Medication 1000 MILLIGRAM(S): at 15:30

## 2020-03-08 RX ADMIN — Medication 15 MILLIGRAM(S): at 05:26

## 2020-03-08 RX ADMIN — Medication 1000 MILLIGRAM(S): at 21:49

## 2020-03-08 RX ADMIN — Medication 100 MILLIEQUIVALENT(S): at 13:03

## 2020-03-08 NOTE — PROGRESS NOTE ADULT - SUBJECTIVE AND OBJECTIVE BOX
INTERVAL HPI/OVERNIGHT EVENTS:    STATUS POST:  open sigmoidectomy, LOC, SBR, creation of colonic pouch with b/l ureteral stent placement    POST OPERATIVE DAY #: 3    SUBJECTIVE: Patient examined bedside with chief resident. Patient reports she is tolerating clear liquid diet and pain well controlled. Patient denies SOB, chest pain, nausea and emesis. Patient making adequate urine output.      heparin  Injectable 5000 Unit(s) SubCutaneous every 8 hours      Vital Signs Last 24 Hrs  T(C): 37.1 (08 Mar 2020 05:41), Max: 37.1 (07 Mar 2020 20:36)  T(F): 98.8 (08 Mar 2020 05:41), Max: 98.8 (08 Mar 2020 05:41)  HR: 88 (08 Mar 2020 05:41) (88 - 97)  BP: 122/74 (08 Mar 2020 05:41) (122/74 - 137/82)  BP(mean): --  RR: 17 (08 Mar 2020 05:41) (16 - 18)  SpO2: 98% (08 Mar 2020 05:41) (95% - 98%)  I&O's Detail    07 Mar 2020 06:01  -  08 Mar 2020 07:00  --------------------------------------------------------  IN:    dextrose 5% + sodium chloride 0.45%.: 880 mL  Total IN: 880 mL    OUT:    Ileostomy: 200 mL    Voided: 1900 mL  Total OUT: 2100 mL    Total NET: -1220 mL          General: NAD, resting comfortably in bed  Pulmonary: Nonlabored breathing, no respiratory distress  Cardiovascular: NSR  Abdominal: soft, mildly distended, + BS, Midline incision c/d/i, ileostomy w/ liquid and solid stool  Extremities: WWP, normal strength      LABS:                        9.6    10.93 )-----------( 302      ( 08 Mar 2020 06:01 )             28.8     03-08    141  |  106  |  6<L>  ----------------------------<  115<H>  3.6   |  25  |  0.53    Ca    8.8      08 Mar 2020 06:01  Phos  2.9     03-08  Mg     1.8     03-08

## 2020-03-08 NOTE — PROGRESS NOTE ADULT - ASSESSMENT
54 year old female with PMH of diverticulitis s/p ex lap, LOC, washout and loop ileostomy creation on 3/7/19 now s/p open sigmoidectomy, LOC, SBR, creation of colonic pouch with b/l ureteral stent placement by Dr. Lee (L stent removed after case) (3/5).     Pain/nausea control prn   CLD/IVF  OOBA/IS  SQH/SCD  AM labs  f/u AXR

## 2020-03-09 LAB
ANION GAP SERPL CALC-SCNC: 8 MMOL/L — SIGNIFICANT CHANGE UP (ref 5–17)
BUN SERPL-MCNC: 6 MG/DL — LOW (ref 7–23)
CALCIUM SERPL-MCNC: 8.7 MG/DL — SIGNIFICANT CHANGE UP (ref 8.4–10.5)
CHLORIDE SERPL-SCNC: 106 MMOL/L — SIGNIFICANT CHANGE UP (ref 96–108)
CO2 SERPL-SCNC: 26 MMOL/L — SIGNIFICANT CHANGE UP (ref 22–31)
CREAT SERPL-MCNC: 0.53 MG/DL — SIGNIFICANT CHANGE UP (ref 0.5–1.3)
GLUCOSE SERPL-MCNC: 131 MG/DL — HIGH (ref 70–99)
HCT VFR BLD CALC: 28.2 % — LOW (ref 34.5–45)
HGB BLD-MCNC: 9.3 G/DL — LOW (ref 11.5–15.5)
MAGNESIUM SERPL-MCNC: 1.8 MG/DL — SIGNIFICANT CHANGE UP (ref 1.6–2.6)
MCHC RBC-ENTMCNC: 30 PG — SIGNIFICANT CHANGE UP (ref 27–34)
MCHC RBC-ENTMCNC: 33 GM/DL — SIGNIFICANT CHANGE UP (ref 32–36)
MCV RBC AUTO: 91 FL — SIGNIFICANT CHANGE UP (ref 80–100)
NRBC # BLD: 0 /100 WBCS — SIGNIFICANT CHANGE UP (ref 0–0)
PHOSPHATE SERPL-MCNC: 3.3 MG/DL — SIGNIFICANT CHANGE UP (ref 2.5–4.5)
PLATELET # BLD AUTO: 356 K/UL — SIGNIFICANT CHANGE UP (ref 150–400)
POTASSIUM SERPL-MCNC: 3.3 MMOL/L — LOW (ref 3.5–5.3)
POTASSIUM SERPL-SCNC: 3.3 MMOL/L — LOW (ref 3.5–5.3)
RBC # BLD: 3.1 M/UL — LOW (ref 3.8–5.2)
RBC # FLD: 12.2 % — SIGNIFICANT CHANGE UP (ref 10.3–14.5)
SODIUM SERPL-SCNC: 140 MMOL/L — SIGNIFICANT CHANGE UP (ref 135–145)
WBC # BLD: 7.85 K/UL — SIGNIFICANT CHANGE UP (ref 3.8–10.5)
WBC # FLD AUTO: 7.85 K/UL — SIGNIFICANT CHANGE UP (ref 3.8–10.5)

## 2020-03-09 RX ORDER — MAGNESIUM SULFATE 500 MG/ML
1 VIAL (ML) INJECTION ONCE
Refills: 0 | Status: COMPLETED | OUTPATIENT
Start: 2020-03-09 | End: 2020-03-09

## 2020-03-09 RX ORDER — POTASSIUM CHLORIDE 20 MEQ
40 PACKET (EA) ORAL EVERY 4 HOURS
Refills: 0 | Status: COMPLETED | OUTPATIENT
Start: 2020-03-09 | End: 2020-03-09

## 2020-03-09 RX ADMIN — Medication 40 MILLIEQUIVALENT(S): at 09:03

## 2020-03-09 RX ADMIN — Medication 15 MILLIGRAM(S): at 06:15

## 2020-03-09 RX ADMIN — Medication 1000 MILLIGRAM(S): at 15:56

## 2020-03-09 RX ADMIN — SODIUM CHLORIDE 100 MILLILITER(S): 9 INJECTION, SOLUTION INTRAVENOUS at 11:12

## 2020-03-09 RX ADMIN — Medication 15 MILLIGRAM(S): at 11:12

## 2020-03-09 RX ADMIN — Medication 15 MILLIGRAM(S): at 05:12

## 2020-03-09 RX ADMIN — Medication 15 MILLIGRAM(S): at 11:22

## 2020-03-09 RX ADMIN — Medication 1000 MILLIGRAM(S): at 09:33

## 2020-03-09 RX ADMIN — Medication 1000 MILLIGRAM(S): at 16:17

## 2020-03-09 RX ADMIN — Medication 1000 MILLIGRAM(S): at 22:26

## 2020-03-09 RX ADMIN — Medication 40 MILLIEQUIVALENT(S): at 13:53

## 2020-03-09 RX ADMIN — HEPARIN SODIUM 5000 UNIT(S): 5000 INJECTION INTRAVENOUS; SUBCUTANEOUS at 05:12

## 2020-03-09 RX ADMIN — Medication 15 MILLIGRAM(S): at 00:21

## 2020-03-09 RX ADMIN — Medication 100 GRAM(S): at 09:03

## 2020-03-09 RX ADMIN — Medication 1000 MILLIGRAM(S): at 05:13

## 2020-03-09 RX ADMIN — Medication 1000 MILLIGRAM(S): at 09:03

## 2020-03-09 RX ADMIN — ALVIMOPAN 12 MILLIGRAM(S): 12 CAPSULE ORAL at 17:48

## 2020-03-09 RX ADMIN — ALVIMOPAN 12 MILLIGRAM(S): 12 CAPSULE ORAL at 05:12

## 2020-03-09 RX ADMIN — Medication 15 MILLIGRAM(S): at 18:02

## 2020-03-09 RX ADMIN — Medication 1000 MILLIGRAM(S): at 23:36

## 2020-03-09 RX ADMIN — HEPARIN SODIUM 5000 UNIT(S): 5000 INJECTION INTRAVENOUS; SUBCUTANEOUS at 13:53

## 2020-03-09 RX ADMIN — Medication 15 MILLIGRAM(S): at 17:49

## 2020-03-09 RX ADMIN — Medication 15 MILLIGRAM(S): at 00:10

## 2020-03-09 RX ADMIN — Medication 1000 MILLIGRAM(S): at 06:15

## 2020-03-09 RX ADMIN — HEPARIN SODIUM 5000 UNIT(S): 5000 INJECTION INTRAVENOUS; SUBCUTANEOUS at 22:26

## 2020-03-09 NOTE — DIETITIAN INITIAL EVALUATION ADULT. - ENERGY NEEDS
Height: 65" Weight: 144lbs, IBW 125lbs+/-10%, %%, BMI 24.0 kg/m2  ABW used for calculations as pt between % of IBW. Needs adjusted slightly for post-op

## 2020-03-09 NOTE — PROGRESS NOTE ADULT - SUBJECTIVE AND OBJECTIVE BOX
SUBJECTIVE: Patient seen and examined at bedside with chief. Patient reports pain is controlled without taking narcotics, is not experiencing nausea or vomiting, ambulating out of bed.   Patient denies fever, chills, chest pain, or shortness of breathe.       MEDICATIONS  (STANDING):  acetaminophen   Tablet .. 1000 milliGRAM(s) Oral every 6 hours  alvimopan 12 milliGRAM(s) Oral two times a day  dextrose 5% + lactated ringers. 1000 milliLiter(s) (100 mL/Hr) IV Continuous <Continuous>  heparin  Injectable 5000 Unit(s) SubCutaneous every 8 hours  ketorolac   Injectable 15 milliGRAM(s) IV Push every 6 hours    MEDICATIONS  (PRN):      Vital Signs Last 24 Hrs  T(C): 36.9 (09 Mar 2020 05:45), Max: 37.1 (08 Mar 2020 17:54)  T(F): 98.4 (09 Mar 2020 05:45), Max: 98.8 (08 Mar 2020 17:54)  HR: 90 (09 Mar 2020 05:45) (88 - 95)  BP: 132/77 (09 Mar 2020 05:45) (126/58 - 132/77)  BP(mean): --  RR: 17 (09 Mar 2020 05:45) (16 - 18)  SpO2: 98% (09 Mar 2020 05:45) (98% - 99%)    Physical Exam:  GENERAL: NAD, Resting comfortably in bed  RESP: Nonlabored breathing, No respiratory distress  CARD: Normal rate, Normal peripheral perfusion  GI: Soft, NT, ND, bowel sounds positive, stoma is patent and viable, with stool and gas in ostomy   EXTREM: WWP, No edema, SCDs in place    I&O's Summary    08 Mar 2020 07:01  -  09 Mar 2020 07:00  --------------------------------------------------------  IN: 2840 mL / OUT: 2135 mL / NET: 705 mL        LABS:                        9.3    7.85  )-----------( 356      ( 09 Mar 2020 06:43 )             28.2     03-09    140  |  106  |  6<L>  ----------------------------<  131<H>  3.3<L>   |  26  |  0.53    Ca    8.7      09 Mar 2020 06:43  Phos  3.3     03-09  Mg     1.8     03-09          CAPILLARY BLOOD GLUCOSE

## 2020-03-09 NOTE — DIETITIAN INITIAL EVALUATION ADULT. - REASON INDICATOR FOR ASSESSMENT
----- Message from Ssuan Bhakta MD sent at 9/13/2017  4:28 PM CDT -----  Please call pt w/positive UA and RX w/ Cipro 500 BID X 10d  
Patient informed of positive.UA. Will treat with Cipro 500 mg. One PO bid for 10 days. Per Dr. Bhakta  
LOS Assessment

## 2020-03-09 NOTE — PROGRESS NOTE ADULT - ASSESSMENT
54 year old female with PMH of diverticulitis s/p ex lap, LOC, washout and loop ileostomy creation on 3/7/19 now s/p open sigmoidectomy, LOC, SBR, creation of colonic pouch with b/l ureteral stent placement by Dr. Lee (L stent removed after case; 3/5)    Adv to CLD   NG Tube to gravity trial   Pain/nausea control prn   OOBA/IS  SQH/SCD  AM labs 54 year old female with PMH of diverticulitis s/p ex lap, LOC, washout and loop ileostomy creation on 3/7/19 now s/p open sigmoidectomy, LOC, SBR, creation of colonic pouch with b/l ureteral stent placement by Dr. Lee (L stent removed after case; 3/5)    Adv to CLD   Pain/nausea control prn   OOBA/IS  SQH/SCD  AM labs

## 2020-03-09 NOTE — DIETITIAN INITIAL EVALUATION ADULT. - OTHER INFO
54F with hx of diverticulitis s/p ex lap, LOC, washout and loop ileostomy creation on 3/7/19 now presenting for elective open sigmoidectomy, LOC, SBR, creation of colonic pouch with b/l ureteral stent placement (3/5/20). On assessment, pt resting in bed comfortably. Diet was advanced to CLD this am, pt tolerating PO well without complaint of N/V. Education provided on likely diet progression with education on ileostomy nutrition guidelines. Pt well educated on diet as she has had ileostomy x1 year- educational handout left at bedside. Noted good PO intake PTA> No changes in UBW. Follows regular diet with ileostomy nutrition guidelines. NKFA. GI: WDL, Ileostomy (400 ml/24hrs). Pain: Denies at this time- well controlled with medication. Skin: Surgical incision, homero score 20. Please see nutrition recommendations below. RD to follow up per protocol.

## 2020-03-09 NOTE — DIETITIAN INITIAL EVALUATION ADULT. - ADD RECOMMEND
1. CLD 2. Recommend advance to LFD when medically feasible 3. RD diet education reenforcement prn 4. Cont to encourage adequate PO intake

## 2020-03-10 ENCOUNTER — TRANSCRIPTION ENCOUNTER (OUTPATIENT)
Age: 55
End: 2020-03-10

## 2020-03-10 VITALS
OXYGEN SATURATION: 94 % | TEMPERATURE: 98 F | DIASTOLIC BLOOD PRESSURE: 80 MMHG | HEART RATE: 92 BPM | SYSTOLIC BLOOD PRESSURE: 132 MMHG | RESPIRATION RATE: 18 BRPM

## 2020-03-10 LAB
ANION GAP SERPL CALC-SCNC: 10 MMOL/L — SIGNIFICANT CHANGE UP (ref 5–17)
BUN SERPL-MCNC: 4 MG/DL — LOW (ref 7–23)
CALCIUM SERPL-MCNC: 9.2 MG/DL — SIGNIFICANT CHANGE UP (ref 8.4–10.5)
CHLORIDE SERPL-SCNC: 105 MMOL/L — SIGNIFICANT CHANGE UP (ref 96–108)
CO2 SERPL-SCNC: 26 MMOL/L — SIGNIFICANT CHANGE UP (ref 22–31)
CREAT SERPL-MCNC: 0.52 MG/DL — SIGNIFICANT CHANGE UP (ref 0.5–1.3)
GLUCOSE SERPL-MCNC: 125 MG/DL — HIGH (ref 70–99)
HCT VFR BLD CALC: 29.7 % — LOW (ref 34.5–45)
HGB BLD-MCNC: 10 G/DL — LOW (ref 11.5–15.5)
MAGNESIUM SERPL-MCNC: 1.9 MG/DL — SIGNIFICANT CHANGE UP (ref 1.6–2.6)
MCHC RBC-ENTMCNC: 30.4 PG — SIGNIFICANT CHANGE UP (ref 27–34)
MCHC RBC-ENTMCNC: 33.7 GM/DL — SIGNIFICANT CHANGE UP (ref 32–36)
MCV RBC AUTO: 90.3 FL — SIGNIFICANT CHANGE UP (ref 80–100)
NRBC # BLD: 0 /100 WBCS — SIGNIFICANT CHANGE UP (ref 0–0)
PHOSPHATE SERPL-MCNC: 3.7 MG/DL — SIGNIFICANT CHANGE UP (ref 2.5–4.5)
PLATELET # BLD AUTO: 398 K/UL — SIGNIFICANT CHANGE UP (ref 150–400)
POTASSIUM SERPL-MCNC: 3.5 MMOL/L — SIGNIFICANT CHANGE UP (ref 3.5–5.3)
POTASSIUM SERPL-SCNC: 3.5 MMOL/L — SIGNIFICANT CHANGE UP (ref 3.5–5.3)
RBC # BLD: 3.29 M/UL — LOW (ref 3.8–5.2)
RBC # FLD: 12 % — SIGNIFICANT CHANGE UP (ref 10.3–14.5)
SODIUM SERPL-SCNC: 141 MMOL/L — SIGNIFICANT CHANGE UP (ref 135–145)
SURGICAL PATHOLOGY STUDY: SIGNIFICANT CHANGE UP
WBC # BLD: 7.72 K/UL — SIGNIFICANT CHANGE UP (ref 3.8–10.5)
WBC # FLD AUTO: 7.72 K/UL — SIGNIFICANT CHANGE UP (ref 3.8–10.5)

## 2020-03-10 RX ORDER — DOCUSATE SODIUM 100 MG
1 CAPSULE ORAL
Qty: 10 | Refills: 0
Start: 2020-03-10 | End: 2020-03-14

## 2020-03-10 RX ORDER — MAGNESIUM SULFATE 500 MG/ML
1 VIAL (ML) INJECTION ONCE
Refills: 0 | Status: COMPLETED | OUTPATIENT
Start: 2020-03-10 | End: 2020-03-10

## 2020-03-10 RX ORDER — POTASSIUM CHLORIDE 20 MEQ
40 PACKET (EA) ORAL ONCE
Refills: 0 | Status: COMPLETED | OUTPATIENT
Start: 2020-03-10 | End: 2020-03-10

## 2020-03-10 RX ORDER — POTASSIUM CHLORIDE 20 MEQ
20 PACKET (EA) ORAL ONCE
Refills: 0 | Status: COMPLETED | OUTPATIENT
Start: 2020-03-10 | End: 2020-03-10

## 2020-03-10 RX ADMIN — Medication 1000 MILLIGRAM(S): at 09:47

## 2020-03-10 RX ADMIN — Medication 20 MILLIEQUIVALENT(S): at 07:47

## 2020-03-10 RX ADMIN — Medication 1000 MILLIGRAM(S): at 10:17

## 2020-03-10 RX ADMIN — Medication 15 MILLIGRAM(S): at 00:14

## 2020-03-10 RX ADMIN — SODIUM CHLORIDE 100 MILLILITER(S): 9 INJECTION, SOLUTION INTRAVENOUS at 06:27

## 2020-03-10 RX ADMIN — ALVIMOPAN 12 MILLIGRAM(S): 12 CAPSULE ORAL at 05:57

## 2020-03-10 RX ADMIN — Medication 15 MILLIGRAM(S): at 00:45

## 2020-03-10 RX ADMIN — Medication 15 MILLIGRAM(S): at 06:27

## 2020-03-10 RX ADMIN — Medication 40 MILLIEQUIVALENT(S): at 07:47

## 2020-03-10 RX ADMIN — Medication 1000 MILLIGRAM(S): at 05:58

## 2020-03-10 RX ADMIN — Medication 100 GRAM(S): at 07:47

## 2020-03-10 RX ADMIN — Medication 1000 MILLIGRAM(S): at 06:58

## 2020-03-10 RX ADMIN — Medication 15 MILLIGRAM(S): at 05:57

## 2020-03-10 RX ADMIN — SODIUM CHLORIDE 100 MILLILITER(S): 9 INJECTION, SOLUTION INTRAVENOUS at 02:48

## 2020-03-10 RX ADMIN — HEPARIN SODIUM 5000 UNIT(S): 5000 INJECTION INTRAVENOUS; SUBCUTANEOUS at 05:57

## 2020-03-10 RX ADMIN — Medication 15 MILLIGRAM(S): at 12:56

## 2020-03-10 NOTE — PROGRESS NOTE ADULT - ASSESSMENT
54 year old female with PMH of diverticulitis s/p ex lap, LOC, washout and loop ileostomy creation on 3/7/19 now s/p open sigmoidectomy, LOC, SBR, creation of colonic pouch with b/l ureteral stent placement by Dr. Lee 3/5, both removed post operatively     Pain/nausea control PRN   CLD/IVF  OOBA/IS  SQH/SCD  AM labs

## 2020-03-10 NOTE — PROGRESS NOTE ADULT - SUBJECTIVE AND OBJECTIVE BOX
SUBJECTIVE: Patient seen and examined at bedside with chief. Patient reports tolerating clear liquid diet without nausea or vomiting, pain is controlled, is ambulating out of bed to hallway.   Patient denies fever, chills, chest pain, or shortness of breathe.       MEDICATIONS  (STANDING):  acetaminophen   Tablet .. 1000 milliGRAM(s) Oral every 6 hours  alvimopan 12 milliGRAM(s) Oral two times a day  dextrose 5% + lactated ringers. 1000 milliLiter(s) (100 mL/Hr) IV Continuous <Continuous>  heparin  Injectable 5000 Unit(s) SubCutaneous every 8 hours  ketorolac   Injectable 15 milliGRAM(s) IV Push every 6 hours    MEDICATIONS  (PRN):      Vital Signs Last 24 Hrs  T(C): 36.9 (10 Mar 2020 06:12), Max: 36.9 (09 Mar 2020 12:39)  T(F): 98.4 (10 Mar 2020 06:12), Max: 98.5 (09 Mar 2020 12:39)  HR: 89 (10 Mar 2020 06:12) (81 - 89)  BP: 134/80 (10 Mar 2020 06:12) (125/78 - 137/84)  BP(mean): --  RR: 18 (10 Mar 2020 06:12) (16 - 18)  SpO2: 97% (10 Mar 2020 06:12) (97% - 98%)    Physical Exam:  GENERAL: NAD, Resting comfortably in bed  RESP: Nonlabored breathing, No respiratory distress  CARD: Normal rate, Normal peripheral perfusion  GI: Soft, NT, ND, stoma is patent viable and everted with stool and gas in ostomy bag   EXTREM: WWP, No edema, SCDs in place    I&O's Summary    09 Mar 2020 07:01  -  10 Mar 2020 07:00  --------------------------------------------------------  IN: 2975 mL / OUT: 2750 mL / NET: 225 mL        LABS:                        10.0   7.72  )-----------( 398      ( 10 Mar 2020 06:04 )             29.7     03-10    141  |  105  |  4<L>  ----------------------------<  125<H>  3.5   |  26  |  0.52    Ca    9.2      10 Mar 2020 06:04  Phos  3.7     03-10  Mg     1.9     03-10          CAPILLARY BLOOD GLUCOSE

## 2020-03-10 NOTE — DISCHARGE NOTE NURSING/CASE MANAGEMENT/SOCIAL WORK - PATIENT PORTAL LINK FT
You can access the FollowMyHealth Patient Portal offered by [facility] by registering at the following website: [email or web address]. By joining [employer]’s FollowMyHealth portal, you will also be able to view your health information using other applications (apps) compatible with our system.

## 2020-03-12 PROCEDURE — 85027 COMPLETE CBC AUTOMATED: CPT

## 2020-03-12 PROCEDURE — 86803 HEPATITIS C AB TEST: CPT

## 2020-03-12 PROCEDURE — 83735 ASSAY OF MAGNESIUM: CPT

## 2020-03-12 PROCEDURE — 36415 COLL VENOUS BLD VENIPUNCTURE: CPT

## 2020-03-12 PROCEDURE — 74019 RADEX ABDOMEN 2 VIEWS: CPT

## 2020-03-12 PROCEDURE — 84100 ASSAY OF PHOSPHORUS: CPT

## 2020-03-12 PROCEDURE — C1758: CPT

## 2020-03-12 PROCEDURE — 80048 BASIC METABOLIC PNL TOTAL CA: CPT

## 2020-03-12 PROCEDURE — C1889: CPT

## 2020-03-12 PROCEDURE — 88304 TISSUE EXAM BY PATHOLOGIST: CPT

## 2020-03-12 PROCEDURE — 82962 GLUCOSE BLOOD TEST: CPT

## 2020-03-12 PROCEDURE — 88307 TISSUE EXAM BY PATHOLOGIST: CPT

## 2020-03-12 PROCEDURE — C1769: CPT

## 2020-03-13 DIAGNOSIS — K57.92 DIVERTICULITIS OF INTESTINE, PART UNSPECIFIED, WITHOUT PERFORATION OR ABSCESS WITHOUT BLEEDING: ICD-10-CM

## 2020-03-13 DIAGNOSIS — R10.9 UNSPECIFIED ABDOMINAL PAIN: ICD-10-CM

## 2020-03-13 DIAGNOSIS — K66.0 PERITONEAL ADHESIONS (POSTPROCEDURAL) (POSTINFECTION): ICD-10-CM

## 2020-03-13 DIAGNOSIS — K63.2 FISTULA OF INTESTINE: ICD-10-CM

## 2020-03-13 DIAGNOSIS — F90.9 ATTENTION-DEFICIT HYPERACTIVITY DISORDER, UNSPECIFIED TYPE: ICD-10-CM

## 2020-03-13 PROBLEM — K63.1 PERFORATION OF INTESTINE (NONTRAUMATIC): Chronic | Status: ACTIVE | Noted: 2020-03-05

## 2020-03-13 PROBLEM — M72.2 PLANTAR FASCIAL FIBROMATOSIS: Chronic | Status: ACTIVE | Noted: 2020-03-05

## 2020-03-23 ENCOUNTER — APPOINTMENT (OUTPATIENT)
Dept: COLORECTAL SURGERY | Facility: CLINIC | Age: 55
End: 2020-03-23

## 2020-03-23 ENCOUNTER — APPOINTMENT (OUTPATIENT)
Dept: COLORECTAL SURGERY | Facility: CLINIC | Age: 55
End: 2020-03-23
Payer: COMMERCIAL

## 2020-03-23 VITALS
DIASTOLIC BLOOD PRESSURE: 85 MMHG | HEART RATE: 101 BPM | WEIGHT: 136 LBS | TEMPERATURE: 98.2 F | SYSTOLIC BLOOD PRESSURE: 135 MMHG | BODY MASS INDEX: 22.66 KG/M2 | HEIGHT: 65 IN

## 2020-03-23 PROCEDURE — 99024 POSTOP FOLLOW-UP VISIT: CPT

## 2020-03-23 NOTE — HISTORY OF PRESENT ILLNESS
[FreeTextEntry1] : 55 y/o F presents for f/u diverticulitis\par S/p ex lap, LOC, washout, and loop ileostomy creation 3/7/19\par S/p exploratory laparotomy, lysis of adhesions, sigmoid colectomy with flexible sigmoidoscopy, cystoscopy, ureteral stent insertion and ileal small bowel resection on 3/5/20 \par Reports discomfort from staples, stating "they are annoying". States that yesterday she noticed a small amount of drainage from the site. Denies odor, fever or chills\par C/o mild pain initially at the top of the abdominal incision, used Percocet nightly until she ran out recently\par Appetite and energy slowly returning to normal. Has not worked any dietary fiber back in to diet yet\par BH: 4-5 outputs daily, emptying 1/4 full. Previously was watery consistency but more recently has become paste like. Changing appliance twice per week

## 2020-03-23 NOTE — ASSESSMENT
[FreeTextEntry1] : Local wound care instructions reviewed.\par \par Liberalize his diet and activity.\par \par We will plan for ileostomy closure in 2-3 months

## 2020-03-23 NOTE — PHYSICAL EXAM
[Abdomen Masses] : No abdominal masses [Abdomen Tenderness] : ~T No ~M abdominal tenderness [No HSM] : no hepatosplenomegaly [JVD] : no jugular venous distention  [Normal Breath Sounds] : Normal breath sounds [Normal Heart Sounds] : normal heart sounds [de-identified] : Staples in place. Staples removed. At the superior aspect of the wound superficial opening draining seroma. Penrose drain placed.Ileostomy functional.

## 2020-04-09 NOTE — ED PROVIDER NOTE - NS ED MD DISPO SPECIAL CONSIDERATION1
Pt has been taking buspirone 15mg tabs---- 1/3 tab (5mg) qam, 2/3 tab (10mg) @ noon and @hs because the 10mg tabs were not available for a long time. Pt states she called [facility]/ [facility] today and they have the 10mg tabs back in stock.   Pt requests a script IR

## 2020-04-27 ENCOUNTER — APPOINTMENT (OUTPATIENT)
Dept: COLORECTAL SURGERY | Facility: CLINIC | Age: 55
End: 2020-04-27
Payer: COMMERCIAL

## 2020-04-27 VITALS
SYSTOLIC BLOOD PRESSURE: 122 MMHG | BODY MASS INDEX: 23.82 KG/M2 | DIASTOLIC BLOOD PRESSURE: 84 MMHG | TEMPERATURE: 98.5 F | WEIGHT: 143 LBS | HEART RATE: 81 BPM | HEIGHT: 65 IN

## 2020-04-27 PROCEDURE — 99024 POSTOP FOLLOW-UP VISIT: CPT

## 2020-04-27 NOTE — HISTORY OF PRESENT ILLNESS
[FreeTextEntry1] : She returns today doing well excellent energy and appetite. Denies pain.\par \par Minimal drainage from wound.\par \par Ileostomy thick effluent- functional without issues

## 2020-04-27 NOTE — ASSESSMENT
[FreeTextEntry1] : Recovering well\par \par Will plan for flex sigmoidoscopy and Gastrografin enema after pandemic issues improve.\par \par COntinue local care.\par

## 2020-04-27 NOTE — PHYSICAL EXAM
[Abdomen Masses] : No abdominal masses [Abdomen Tenderness] : ~T No ~M abdominal tenderness [No HSM] : no hepatosplenomegaly [JVD] : no jugular venous distention  [Normal Breath Sounds] : Normal breath sounds [Normal Heart Sounds] : normal heart sounds [de-identified] : Minimal serous drainage - no erythema or fluctuance- simple tract. seton removed.

## 2020-06-08 ENCOUNTER — APPOINTMENT (OUTPATIENT)
Dept: COLORECTAL SURGERY | Facility: CLINIC | Age: 55
End: 2020-06-08
Payer: COMMERCIAL

## 2020-06-08 VITALS
SYSTOLIC BLOOD PRESSURE: 124 MMHG | DIASTOLIC BLOOD PRESSURE: 82 MMHG | WEIGHT: 145 LBS | HEIGHT: 65 IN | TEMPERATURE: 98.5 F | HEART RATE: 100 BPM | BODY MASS INDEX: 24.16 KG/M2

## 2020-06-08 PROCEDURE — 99215 OFFICE O/P EST HI 40 MIN: CPT | Mod: 25

## 2020-06-08 PROCEDURE — 45330 DIAGNOSTIC SIGMOIDOSCOPY: CPT

## 2020-06-08 NOTE — HISTORY OF PRESENT ILLNESS
[FreeTextEntry1] : 55 yo F for f/u diverticulitis, s/p ex lap, LOC, washout, and loop ileostomy creation 3/7/19\par S/p exploratory laparotomy, lysis of adhesions, sigmoid colectomy with flexible sigmoidoscopy, cystoscopy, ureteral stent insertion and ileal small bowel resection on 3/5/20 \par Last seen 4/27/20 for post op follow up. Pt presents today for flex sig\par \par \par Pt doing well, reports she has gained weight due to increased carb intake after surgery\par Has started to work out again\par Reports some skin irritation surrounding stoma, denies itching or burning\par Denies abd pain, n/v, blood in stool\par \par BH: empties anytime she sees stool, approx 5-8 times daily. Consistency varies from looser to pudding like\par Reports gets adequate dietary fiber and water.\par Has since seen a nutritionist and plans on meeting w/ another dietician. Takes a drink supplement GI Replenish w/ pro-and pre-biotics w/ fiber supplement

## 2020-06-08 NOTE — PHYSICAL EXAM
[Abdomen Masses] : No abdominal masses [Abdomen Tenderness] : ~T No ~M abdominal tenderness [No HSM] : no hepatosplenomegaly [Normal] : was normal [None] : there was no rectal mass  [JVD] : no jugular venous distention  [Normal Breath Sounds] : Normal breath sounds [Normal Heart Sounds] : normal heart sounds [de-identified] : midline  wound well healed. Ileosotmy functional [FreeTextEntry1] : The risks , benefits and alternatives of the procedure were reviewed with the patient. The patient consents to the planned procedure.\par \par The flexible sigmoidoscope was passed through the anus into the rectum. The scope was passed to  35    cm from the anal verge.\par \par The findings revealed:\par \par at 15 side to end widely patent anastomosis

## 2020-06-08 NOTE — ASSESSMENT
[FreeTextEntry1] : I had extensive discussion with the patient (45 minutes) regarding the diagnosis and treatment options. I recommended that he consider proceeding with a closure of ileostomy- small bowel resection..\par \par The associated risks, benefits, alternatives of the procedure have been outlined discussed and reviewed with the patient'. These risks including but not limited to bleeding, infection, anastomotic leak, need for secondary surgery, need for ileostomy or colostomy creation, change in bowel habits, as well as the risk of heart and lung complications infection and death were detailed. The patient understands these risks and consents the planned procedure. Appropriate  literature regarding surgery and post operative treatment/complications and enhanced recovery pathway has been detailed and reviewed. Consent was obtained. All questions were answered.\par \par \par

## 2020-07-13 ENCOUNTER — LABORATORY RESULT (OUTPATIENT)
Age: 55
End: 2020-07-13

## 2020-07-15 ENCOUNTER — TRANSCRIPTION ENCOUNTER (OUTPATIENT)
Age: 55
End: 2020-07-15

## 2020-07-15 VITALS
OXYGEN SATURATION: 100 % | HEART RATE: 69 BPM | RESPIRATION RATE: 16 BRPM | WEIGHT: 140.88 LBS | DIASTOLIC BLOOD PRESSURE: 77 MMHG | HEIGHT: 65 IN | TEMPERATURE: 97 F | SYSTOLIC BLOOD PRESSURE: 114 MMHG

## 2020-07-15 RX ORDER — GABAPENTIN 400 MG/1
0 CAPSULE ORAL
Qty: 0 | Refills: 0 | DISCHARGE

## 2020-07-16 ENCOUNTER — INPATIENT (INPATIENT)
Facility: HOSPITAL | Age: 55
LOS: 1 days | Discharge: HOME | DRG: 331 | End: 2020-07-18
Attending: SURGERY | Admitting: SURGERY
Payer: COMMERCIAL

## 2020-07-16 ENCOUNTER — RESULT REVIEW (OUTPATIENT)
Age: 55
End: 2020-07-16

## 2020-07-16 ENCOUNTER — APPOINTMENT (OUTPATIENT)
Dept: COLORECTAL SURGERY | Facility: HOSPITAL | Age: 55
End: 2020-07-16

## 2020-07-16 DIAGNOSIS — Z90.49 ACQUIRED ABSENCE OF OTHER SPECIFIED PARTS OF DIGESTIVE TRACT: Chronic | ICD-10-CM

## 2020-07-16 DIAGNOSIS — Z98.890 OTHER SPECIFIED POSTPROCEDURAL STATES: Chronic | ICD-10-CM

## 2020-07-16 LAB
BLD GP AB SCN SERPL QL: NEGATIVE — SIGNIFICANT CHANGE UP
GLUCOSE BLDC GLUCOMTR-MCNC: 109 MG/DL — HIGH (ref 70–99)
GLUCOSE BLDC GLUCOMTR-MCNC: 99 MG/DL — SIGNIFICANT CHANGE UP (ref 70–99)
RH IG SCN BLD-IMP: NEGATIVE — SIGNIFICANT CHANGE UP

## 2020-07-16 PROCEDURE — 88304 TISSUE EXAM BY PATHOLOGIST: CPT | Mod: 26

## 2020-07-16 PROCEDURE — 44625 REPAIR BOWEL OPENING: CPT | Mod: GC

## 2020-07-16 RX ORDER — ENOXAPARIN SODIUM 100 MG/ML
40 INJECTION SUBCUTANEOUS EVERY 24 HOURS
Refills: 0 | Status: DISCONTINUED | OUTPATIENT
Start: 2020-07-16 | End: 2020-07-18

## 2020-07-16 RX ORDER — ACETAMINOPHEN 500 MG
1000 TABLET ORAL EVERY 6 HOURS
Refills: 0 | Status: DISCONTINUED | OUTPATIENT
Start: 2020-07-16 | End: 2020-07-18

## 2020-07-16 RX ORDER — ONDANSETRON 8 MG/1
4 TABLET, FILM COATED ORAL EVERY 6 HOURS
Refills: 0 | Status: DISCONTINUED | OUTPATIENT
Start: 2020-07-16 | End: 2020-07-18

## 2020-07-16 RX ORDER — SODIUM CHLORIDE 9 MG/ML
1000 INJECTION, SOLUTION INTRAVENOUS
Refills: 0 | Status: DISCONTINUED | OUTPATIENT
Start: 2020-07-16 | End: 2020-07-18

## 2020-07-16 RX ORDER — HYDROMORPHONE HYDROCHLORIDE 2 MG/ML
0.5 INJECTION INTRAMUSCULAR; INTRAVENOUS; SUBCUTANEOUS EVERY 4 HOURS
Refills: 0 | Status: DISCONTINUED | OUTPATIENT
Start: 2020-07-16 | End: 2020-07-18

## 2020-07-16 RX ORDER — BUPIVACAINE 13.3 MG/ML
20 INJECTION, SUSPENSION, LIPOSOMAL INFILTRATION ONCE
Refills: 0 | Status: DISCONTINUED | OUTPATIENT
Start: 2020-07-16 | End: 2020-07-18

## 2020-07-16 RX ORDER — HEPARIN SODIUM 5000 [USP'U]/ML
5000 INJECTION INTRAVENOUS; SUBCUTANEOUS ONCE
Refills: 0 | Status: COMPLETED | OUTPATIENT
Start: 2020-07-16 | End: 2020-07-16

## 2020-07-16 RX ORDER — GABAPENTIN 400 MG/1
600 CAPSULE ORAL ONCE
Refills: 0 | Status: COMPLETED | OUTPATIENT
Start: 2020-07-16 | End: 2020-07-16

## 2020-07-16 RX ORDER — ACETAMINOPHEN 500 MG
1000 TABLET ORAL ONCE
Refills: 0 | Status: COMPLETED | OUTPATIENT
Start: 2020-07-16 | End: 2020-07-16

## 2020-07-16 RX ORDER — ALPRAZOLAM 0.25 MG
0.25 TABLET ORAL THREE TIMES A DAY
Refills: 0 | Status: DISCONTINUED | OUTPATIENT
Start: 2020-07-16 | End: 2020-07-18

## 2020-07-16 RX ORDER — KETOROLAC TROMETHAMINE 30 MG/ML
15 SYRINGE (ML) INJECTION EVERY 6 HOURS
Refills: 0 | Status: DISCONTINUED | OUTPATIENT
Start: 2020-07-16 | End: 2020-07-18

## 2020-07-16 RX ADMIN — HEPARIN SODIUM 5000 UNIT(S): 5000 INJECTION INTRAVENOUS; SUBCUTANEOUS at 06:46

## 2020-07-16 RX ADMIN — Medication 15 MILLIGRAM(S): at 23:30

## 2020-07-16 RX ADMIN — GABAPENTIN 600 MILLIGRAM(S): 400 CAPSULE ORAL at 06:45

## 2020-07-16 RX ADMIN — ENOXAPARIN SODIUM 40 MILLIGRAM(S): 100 INJECTION SUBCUTANEOUS at 17:33

## 2020-07-16 RX ADMIN — Medication 1000 MILLIGRAM(S): at 06:45

## 2020-07-16 RX ADMIN — Medication 1000 MILLIGRAM(S): at 17:34

## 2020-07-16 RX ADMIN — Medication 15 MILLIGRAM(S): at 23:14

## 2020-07-16 RX ADMIN — Medication 1000 MILLIGRAM(S): at 13:15

## 2020-07-16 RX ADMIN — Medication 15 MILLIGRAM(S): at 10:53

## 2020-07-16 RX ADMIN — HYDROMORPHONE HYDROCHLORIDE 0.5 MILLIGRAM(S): 2 INJECTION INTRAMUSCULAR; INTRAVENOUS; SUBCUTANEOUS at 14:00

## 2020-07-16 RX ADMIN — Medication 1000 MILLIGRAM(S): at 23:45

## 2020-07-16 RX ADMIN — Medication 15 MILLIGRAM(S): at 17:34

## 2020-07-16 RX ADMIN — Medication 0.25 MILLIGRAM(S): at 23:15

## 2020-07-16 RX ADMIN — HYDROMORPHONE HYDROCHLORIDE 0.5 MILLIGRAM(S): 2 INJECTION INTRAMUSCULAR; INTRAVENOUS; SUBCUTANEOUS at 18:48

## 2020-07-16 RX ADMIN — Medication 15 MILLIGRAM(S): at 18:01

## 2020-07-16 RX ADMIN — HYDROMORPHONE HYDROCHLORIDE 0.5 MILLIGRAM(S): 2 INJECTION INTRAMUSCULAR; INTRAVENOUS; SUBCUTANEOUS at 09:45

## 2020-07-16 RX ADMIN — Medication 1000 MILLIGRAM(S): at 13:02

## 2020-07-16 RX ADMIN — SODIUM CHLORIDE 40 MILLILITER(S): 9 INJECTION, SOLUTION INTRAVENOUS at 13:02

## 2020-07-16 RX ADMIN — Medication 1000 MILLIGRAM(S): at 18:01

## 2020-07-16 RX ADMIN — HYDROMORPHONE HYDROCHLORIDE 0.5 MILLIGRAM(S): 2 INJECTION INTRAMUSCULAR; INTRAVENOUS; SUBCUTANEOUS at 10:15

## 2020-07-16 RX ADMIN — HYDROMORPHONE HYDROCHLORIDE 0.5 MILLIGRAM(S): 2 INJECTION INTRAMUSCULAR; INTRAVENOUS; SUBCUTANEOUS at 14:31

## 2020-07-16 RX ADMIN — Medication 15 MILLIGRAM(S): at 11:01

## 2020-07-16 RX ADMIN — Medication 1000 MILLIGRAM(S): at 23:14

## 2020-07-16 RX ADMIN — HYDROMORPHONE HYDROCHLORIDE 0.5 MILLIGRAM(S): 2 INJECTION INTRAMUSCULAR; INTRAVENOUS; SUBCUTANEOUS at 19:15

## 2020-07-16 NOTE — BRIEF OPERATIVE NOTE - OPERATION/FINDINGS
Procedure: closure of loop ileostomy Procedure: loop ileostomy reversal  Ileostomy site excised. Common enterotomies made and side-to-side Fly anastomosis created using JENNIFER stapler 60 mm purple load x2 and TA stapler 100 mm purple load for enterotomy closure. Medium clips were used for hemostasis of staple line. Hemostasis confirmed. #1 PDS suture used for fascial closure in figure of eight formation. Prior ostomy site packed with betadine and approximated with 4-0 Monocryl purse-string.

## 2020-07-16 NOTE — H&P ADULT - HISTORY OF PRESENT ILLNESS
54 year old female with PMH of diverticulitis s/p ex lap, LOC, washout, and loop ileostomy creation on 3/7/19, s/p ex lap, lysis of adhesions, sigmoid colectomy and ileal small bowel resection on 3/5/20 presents today for ileostomy closure. Patient doing well post op, currently has no complaints.

## 2020-07-16 NOTE — H&P ADULT - ASSESSMENT
54 year old female with PMH of diverticulitis s/p ex lap, LOC, washout, and loop ileostomy creation on 3/7/19, s/p ex lap, lysis of adhesions, sigmoid colectomy and ileal small bowel resection on 3/5/20 presents today for ileostomy closure.     Plan:  To OR, admission post op  ERAS protocol
Loud

## 2020-07-16 NOTE — PROGRESS NOTE ADULT - SUBJECTIVE AND OBJECTIVE BOX
POST OP CHECK    Procedure: Ileostomy reversal  Surgeon: Dr. Ybarra    S: Pt seen and examined at bedside. Reports no acute complaints. Denies F, N, V, CP, SOB, ZAVALETA, calf tenderness. Pain controlled with medication.    O:  T(C): 36.2 (07-16-20 @ 09:28), Max: 36.2 (07-16-20 @ 09:28)  T(F): 97.1 (07-16-20 @ 09:28), Max: 97.1 (07-16-20 @ 09:28)  HR: 64 (07-16-20 @ 10:30) (63 - 75)  BP: 127/66 (07-16-20 @ 10:30) (127/66 - 150/79)  RR: 14 (07-16-20 @ 10:30) (12 - 16)  SpO2: 100% (07-16-20 @ 10:30) (100% - 100%)  Wt(kg): --      Gen: NAD, resting comfortably in bed, A&O x3  C/V: NSR  Pulm: Nonlabored breathing, no respiratory distress  Abd: soft, NT/ND Incision:  Extrem: WWP, no calf edema, SCDs in place      A/P: 55yFemale s/p above procedure  Diet:  IVF:  Pain/nausea control  DVT ppx:  Dispo plan: POST OP CHECK    Procedure: Ileostomy reversal  Surgeon: Dr. Ybarra    S: Pt seen and examined at bedside. Reports no acute complaints. Denies F, N, V, CP, SOB, ZAVALETA, calf tenderness. Pain controlled with medication.    O:  T(C): 36.2 (07-16-20 @ 09:28), Max: 36.2 (07-16-20 @ 09:28)  T(F): 97.1 (07-16-20 @ 09:28), Max: 97.1 (07-16-20 @ 09:28)  HR: 64 (07-16-20 @ 10:30) (63 - 75)  BP: 127/66 (07-16-20 @ 10:30) (127/66 - 150/79)  RR: 14 (07-16-20 @ 10:30) (12 - 16)  SpO2: 100% (07-16-20 @ 10:30) (100% - 100%)  Wt(kg): --      Gen: NAD, resting comfortably in bed, A&O x3  C/V: NSR  Pulm: Nonlabored breathing, no respiratory distress  Abd: soft, nd, appropriately ttp to incisions. Dressings c/d/i. No oozing or bleeding.  : Infante in place draining clear yellow urine   Extrem: WWP, no calf edema, SCDs in place      A/P: 54 year old female with PMH of diverticulitis s/p ex lap, LOC, washout, and loop ileostomy creation on 3/7/19, s/p ex lap, lysis of adhesions, sigmoid colectomy and ileal small bowel resection on 3/5/20 presents today for ileostomy closure. Patient doing well post op, currently has no complaints. Now POD#0 s/p ileostomy reversal.    ERAS  Pain/nausea control prn  CLD/LR@40  Lovenox/SCDs   Betadine packing change POD2  Infante, monitor OP  AM labs POST OP CHECK    Procedure: Ileostomy reversal w/ removal of umbilical granuloma   Surgeon: Dr. Ybarra    S: Pt seen and examined at bedside. Reports no acute complaints. Denies F, N, V, CP, SOB, ZAVALETA, calf tenderness. Pain controlled with medication.    O:  T(C): 36.2 (07-16-20 @ 09:28), Max: 36.2 (07-16-20 @ 09:28)  T(F): 97.1 (07-16-20 @ 09:28), Max: 97.1 (07-16-20 @ 09:28)  HR: 64 (07-16-20 @ 10:30) (63 - 75)  BP: 127/66 (07-16-20 @ 10:30) (127/66 - 150/79)  RR: 14 (07-16-20 @ 10:30) (12 - 16)  SpO2: 100% (07-16-20 @ 10:30) (100% - 100%)  Wt(kg): --      Gen: NAD, resting comfortably in bed, A&O x3  C/V: NSR  Pulm: Nonlabored breathing, no respiratory distress  Abd: soft, nd, appropriately ttp to incisions. Dressings c/d/i. No oozing or bleeding.  : Infante in place draining clear yellow urine   Extrem: WWP, no calf edema, SCDs in place      A/P: 54 year old female with PMH of diverticulitis s/p ex lap, LOC, washout, and loop ileostomy creation on 3/7/19, s/p ex lap, lysis of adhesions, sigmoid colectomy and ileal small bowel resection on 3/5/20 presents today for ileostomy closure. Patient doing well post op, currently has no complaints. Now POD#0 s/p : Ileostomy reversal w/ removal of umbilical granuloma     ERAS  Pain/nausea control prn  CLD/LR@40  Lovenox/SCDs   Betadine packing change POD2, umbilical packing  Infante, monitor OP  AM labs

## 2020-07-16 NOTE — H&P ADULT - NSHPPHYSICALEXAM_GEN_ALL_CORE
Constitutional: WDWN NAD  Heart: S1 S2  Lungs: breathing with minimal effort, no use of accessory muscles  Abdomen: soft, nontender, nondistended, scarring from previous surgery healed and scarred, ostomy present, pink and patent with minimal fecal contents

## 2020-07-17 LAB
ANION GAP SERPL CALC-SCNC: 12 MMOL/L — SIGNIFICANT CHANGE UP (ref 5–17)
BASOPHILS # BLD AUTO: 0.03 K/UL — SIGNIFICANT CHANGE UP (ref 0–0.2)
BASOPHILS NFR BLD AUTO: 0.3 % — SIGNIFICANT CHANGE UP (ref 0–2)
BUN SERPL-MCNC: 11 MG/DL — SIGNIFICANT CHANGE UP (ref 7–23)
CALCIUM SERPL-MCNC: 9 MG/DL — SIGNIFICANT CHANGE UP (ref 8.4–10.5)
CHLORIDE SERPL-SCNC: 104 MMOL/L — SIGNIFICANT CHANGE UP (ref 96–108)
CO2 SERPL-SCNC: 25 MMOL/L — SIGNIFICANT CHANGE UP (ref 22–31)
CREAT SERPL-MCNC: 0.63 MG/DL — SIGNIFICANT CHANGE UP (ref 0.5–1.3)
EOSINOPHIL # BLD AUTO: 0.02 K/UL — SIGNIFICANT CHANGE UP (ref 0–0.5)
EOSINOPHIL NFR BLD AUTO: 0.2 % — SIGNIFICANT CHANGE UP (ref 0–6)
GLUCOSE SERPL-MCNC: 101 MG/DL — HIGH (ref 70–99)
HCT VFR BLD CALC: 34.2 % — LOW (ref 34.5–45)
HGB BLD-MCNC: 11.1 G/DL — LOW (ref 11.5–15.5)
IMM GRANULOCYTES NFR BLD AUTO: 0.4 % — SIGNIFICANT CHANGE UP (ref 0–1.5)
LYMPHOCYTES # BLD AUTO: 1.72 K/UL — SIGNIFICANT CHANGE UP (ref 1–3.3)
LYMPHOCYTES # BLD AUTO: 17.6 % — SIGNIFICANT CHANGE UP (ref 13–44)
MAGNESIUM SERPL-MCNC: 2 MG/DL — SIGNIFICANT CHANGE UP (ref 1.6–2.6)
MCHC RBC-ENTMCNC: 28.8 PG — SIGNIFICANT CHANGE UP (ref 27–34)
MCHC RBC-ENTMCNC: 32.5 GM/DL — SIGNIFICANT CHANGE UP (ref 32–36)
MCV RBC AUTO: 88.8 FL — SIGNIFICANT CHANGE UP (ref 80–100)
MONOCYTES # BLD AUTO: 0.8 K/UL — SIGNIFICANT CHANGE UP (ref 0–0.9)
MONOCYTES NFR BLD AUTO: 8.2 % — SIGNIFICANT CHANGE UP (ref 2–14)
NEUTROPHILS # BLD AUTO: 7.15 K/UL — SIGNIFICANT CHANGE UP (ref 1.8–7.4)
NEUTROPHILS NFR BLD AUTO: 73.3 % — SIGNIFICANT CHANGE UP (ref 43–77)
NRBC # BLD: 0 /100 WBCS — SIGNIFICANT CHANGE UP (ref 0–0)
PHOSPHATE SERPL-MCNC: 3 MG/DL — SIGNIFICANT CHANGE UP (ref 2.5–4.5)
PLATELET # BLD AUTO: 275 K/UL — SIGNIFICANT CHANGE UP (ref 150–400)
POTASSIUM SERPL-MCNC: 3.7 MMOL/L — SIGNIFICANT CHANGE UP (ref 3.5–5.3)
POTASSIUM SERPL-SCNC: 3.7 MMOL/L — SIGNIFICANT CHANGE UP (ref 3.5–5.3)
RBC # BLD: 3.85 M/UL — SIGNIFICANT CHANGE UP (ref 3.8–5.2)
RBC # FLD: 12.7 % — SIGNIFICANT CHANGE UP (ref 10.3–14.5)
SODIUM SERPL-SCNC: 141 MMOL/L — SIGNIFICANT CHANGE UP (ref 135–145)
WBC # BLD: 9.76 K/UL — SIGNIFICANT CHANGE UP (ref 3.8–10.5)
WBC # FLD AUTO: 9.76 K/UL — SIGNIFICANT CHANGE UP (ref 3.8–10.5)

## 2020-07-17 RX ORDER — SODIUM,POTASSIUM PHOSPHATES 278-250MG
1 POWDER IN PACKET (EA) ORAL ONCE
Refills: 0 | Status: COMPLETED | OUTPATIENT
Start: 2020-07-17 | End: 2020-07-17

## 2020-07-17 RX ADMIN — Medication 15 MILLIGRAM(S): at 06:20

## 2020-07-17 RX ADMIN — Medication 15 MILLIGRAM(S): at 12:15

## 2020-07-17 RX ADMIN — HYDROMORPHONE HYDROCHLORIDE 0.5 MILLIGRAM(S): 2 INJECTION INTRAMUSCULAR; INTRAVENOUS; SUBCUTANEOUS at 14:05

## 2020-07-17 RX ADMIN — Medication 15 MILLIGRAM(S): at 18:01

## 2020-07-17 RX ADMIN — Medication 1000 MILLIGRAM(S): at 12:15

## 2020-07-17 RX ADMIN — HYDROMORPHONE HYDROCHLORIDE 0.5 MILLIGRAM(S): 2 INJECTION INTRAMUSCULAR; INTRAVENOUS; SUBCUTANEOUS at 21:45

## 2020-07-17 RX ADMIN — Medication 1000 MILLIGRAM(S): at 11:49

## 2020-07-17 RX ADMIN — ENOXAPARIN SODIUM 40 MILLIGRAM(S): 100 INJECTION SUBCUTANEOUS at 18:01

## 2020-07-17 RX ADMIN — Medication 15 MILLIGRAM(S): at 11:50

## 2020-07-17 RX ADMIN — Medication 1000 MILLIGRAM(S): at 19:01

## 2020-07-17 RX ADMIN — Medication 1 PACKET(S): at 13:34

## 2020-07-17 RX ADMIN — SODIUM CHLORIDE 40 MILLILITER(S): 9 INJECTION, SOLUTION INTRAVENOUS at 05:48

## 2020-07-17 RX ADMIN — Medication 15 MILLIGRAM(S): at 23:04

## 2020-07-17 RX ADMIN — Medication 1000 MILLIGRAM(S): at 18:01

## 2020-07-17 RX ADMIN — HYDROMORPHONE HYDROCHLORIDE 0.5 MILLIGRAM(S): 2 INJECTION INTRAMUSCULAR; INTRAVENOUS; SUBCUTANEOUS at 02:18

## 2020-07-17 RX ADMIN — Medication 0.25 MILLIGRAM(S): at 22:53

## 2020-07-17 RX ADMIN — Medication 1000 MILLIGRAM(S): at 06:20

## 2020-07-17 RX ADMIN — Medication 1000 MILLIGRAM(S): at 05:49

## 2020-07-17 RX ADMIN — Medication 15 MILLIGRAM(S): at 05:49

## 2020-07-17 RX ADMIN — HYDROMORPHONE HYDROCHLORIDE 0.5 MILLIGRAM(S): 2 INJECTION INTRAMUSCULAR; INTRAVENOUS; SUBCUTANEOUS at 21:21

## 2020-07-17 RX ADMIN — HYDROMORPHONE HYDROCHLORIDE 0.5 MILLIGRAM(S): 2 INJECTION INTRAMUSCULAR; INTRAVENOUS; SUBCUTANEOUS at 13:38

## 2020-07-17 RX ADMIN — HYDROMORPHONE HYDROCHLORIDE 0.5 MILLIGRAM(S): 2 INJECTION INTRAMUSCULAR; INTRAVENOUS; SUBCUTANEOUS at 10:00

## 2020-07-17 RX ADMIN — HYDROMORPHONE HYDROCHLORIDE 0.5 MILLIGRAM(S): 2 INJECTION INTRAMUSCULAR; INTRAVENOUS; SUBCUTANEOUS at 02:40

## 2020-07-17 RX ADMIN — Medication 1000 MILLIGRAM(S): at 23:04

## 2020-07-17 RX ADMIN — HYDROMORPHONE HYDROCHLORIDE 0.5 MILLIGRAM(S): 2 INJECTION INTRAMUSCULAR; INTRAVENOUS; SUBCUTANEOUS at 09:25

## 2020-07-17 RX ADMIN — Medication 15 MILLIGRAM(S): at 19:01

## 2020-07-17 RX ADMIN — Medication 15 MILLIGRAM(S): at 23:18

## 2020-07-17 NOTE — PROGRESS NOTE ADULT - SUBJECTIVE AND OBJECTIVE BOX
SUBJECTIVE: Patient seen and examined bedside. No acute events overnight. Resting comfortably in bed. AVSS with adequate UOP. Tolerating CLD and ambulating around the unit. Reporting loose stools and flatus. Pain well controlled with current pain regimen. Denies f/c, n/v, CP, SOB, weakness or pain in lower extremities.     enoxaparin Injectable 40 milliGRAM(s) SubCutaneous every 24 hours      Vital Signs Last 24 Hrs  T(C): 37.1 (17 Jul 2020 17:00), Max: 37.2 (17 Jul 2020 05:02)  T(F): 98.8 (17 Jul 2020 17:00), Max: 98.9 (17 Jul 2020 05:02)  HR: 81 (17 Jul 2020 17:00) (66 - 87)  BP: 111/71 (17 Jul 2020 17:00) (97/52 - 124/75)  BP(mean): --  RR: 18 (17 Jul 2020 17:00) (16 - 18)  SpO2: 100% (17 Jul 2020 17:00) (96% - 100%)  I&O's Detail    16 Jul 2020 07:01  -  17 Jul 2020 07:00  --------------------------------------------------------  IN:    lactated ringers.: 400 mL  Total IN: 400 mL    OUT:    Indwelling Catheter - Urethral: 1545 mL  Total OUT: 1545 mL    Total NET: -1145 mL      17 Jul 2020 07:01  -  17 Jul 2020 17:46  --------------------------------------------------------  IN:    lactated ringers.: 360 mL    Oral Fluid: 620 mL  Total IN: 980 mL    OUT:    Voided: 200 mL  Total OUT: 200 mL    Total NET: 780 mL          General: NAD, resting comfortably in bed  C/V: NSR  Pulm: Nonlabored breathing, no respiratory distress  Abd: soft, mildly distended, appropriately tender to palpation. No rebound or guarding.  Extrem: WWP, no edema, SCDs in place  Incision: Ostomy site packed with iodine packing.        LABS:                        11.1   9.76  )-----------( 275      ( 17 Jul 2020 07:02 )             34.2     07-17    141  |  104  |  11  ----------------------------<  101<H>  3.7   |  25  |  0.63    Ca    9.0      17 Jul 2020 07:02  Phos  3.0     07-17  Mg     2.0     07-17            RADIOLOGY & ADDITIONAL STUDIES:

## 2020-07-17 NOTE — PROGRESS NOTE ADULT - ASSESSMENT
54 year old female with PMH of diverticulitis s/p ex lap, LOC, washout, and loop ileostomy creation on 3/7/19, s/p ex lap, lysis of adhesions, sigmoid colectomy and ileal small bowel resection on 3/5/20 presented to [facility] on 7/16/20 for ileostomy closure now s/p Ileostomy reversal w/ removal of umbilical granuloma.    Plan:  LRD  LR 40cc/hr  D/c garcia; TOV 3PM  AM labs/ lytes repleted  Analgesics PRN  Antiemetics PRN  IS/OOB to chair and [facility]  SCDs and lovenox

## 2020-07-18 ENCOUNTER — TRANSCRIPTION ENCOUNTER (OUTPATIENT)
Age: 55
End: 2020-07-18

## 2020-07-18 VITALS
DIASTOLIC BLOOD PRESSURE: 63 MMHG | HEART RATE: 83 BPM | TEMPERATURE: 98 F | SYSTOLIC BLOOD PRESSURE: 109 MMHG | RESPIRATION RATE: 18 BRPM | OXYGEN SATURATION: 98 %

## 2020-07-18 LAB
ANION GAP SERPL CALC-SCNC: 11 MMOL/L — SIGNIFICANT CHANGE UP (ref 5–17)
BUN SERPL-MCNC: 9 MG/DL — SIGNIFICANT CHANGE UP (ref 7–23)
CALCIUM SERPL-MCNC: 9.1 MG/DL — SIGNIFICANT CHANGE UP (ref 8.4–10.5)
CHLORIDE SERPL-SCNC: 104 MMOL/L — SIGNIFICANT CHANGE UP (ref 96–108)
CO2 SERPL-SCNC: 25 MMOL/L — SIGNIFICANT CHANGE UP (ref 22–31)
CREAT SERPL-MCNC: 0.62 MG/DL — SIGNIFICANT CHANGE UP (ref 0.5–1.3)
GLUCOSE SERPL-MCNC: 106 MG/DL — HIGH (ref 70–99)
HCT VFR BLD CALC: 35.1 % — SIGNIFICANT CHANGE UP (ref 34.5–45)
HGB BLD-MCNC: 11.2 G/DL — LOW (ref 11.5–15.5)
MAGNESIUM SERPL-MCNC: 1.9 MG/DL — SIGNIFICANT CHANGE UP (ref 1.6–2.6)
MCHC RBC-ENTMCNC: 28.4 PG — SIGNIFICANT CHANGE UP (ref 27–34)
MCHC RBC-ENTMCNC: 31.9 GM/DL — LOW (ref 32–36)
MCV RBC AUTO: 88.9 FL — SIGNIFICANT CHANGE UP (ref 80–100)
NRBC # BLD: 0 /100 WBCS — SIGNIFICANT CHANGE UP (ref 0–0)
PHOSPHATE SERPL-MCNC: 2.8 MG/DL — SIGNIFICANT CHANGE UP (ref 2.5–4.5)
PLATELET # BLD AUTO: 290 K/UL — SIGNIFICANT CHANGE UP (ref 150–400)
POTASSIUM SERPL-MCNC: 3.8 MMOL/L — SIGNIFICANT CHANGE UP (ref 3.5–5.3)
POTASSIUM SERPL-SCNC: 3.8 MMOL/L — SIGNIFICANT CHANGE UP (ref 3.5–5.3)
RBC # BLD: 3.95 M/UL — SIGNIFICANT CHANGE UP (ref 3.8–5.2)
RBC # FLD: 13.1 % — SIGNIFICANT CHANGE UP (ref 10.3–14.5)
SODIUM SERPL-SCNC: 140 MMOL/L — SIGNIFICANT CHANGE UP (ref 135–145)
WBC # BLD: 10.39 K/UL — SIGNIFICANT CHANGE UP (ref 3.8–10.5)
WBC # FLD AUTO: 10.39 K/UL — SIGNIFICANT CHANGE UP (ref 3.8–10.5)

## 2020-07-18 RX ORDER — ALPRAZOLAM 0.25 MG
1 TABLET ORAL
Qty: 0 | Refills: 0 | DISCHARGE
Start: 2020-07-18

## 2020-07-18 RX ORDER — LISDEXAMFETAMINE DIMESYLATE 70 MG/1
1 CAPSULE ORAL
Qty: 0 | Refills: 0 | DISCHARGE

## 2020-07-18 RX ORDER — ALPRAZOLAM 0.25 MG
1 TABLET ORAL
Qty: 0 | Refills: 0 | DISCHARGE

## 2020-07-18 RX ORDER — DOCUSATE SODIUM 100 MG
1 CAPSULE ORAL
Qty: 60 | Refills: 0
Start: 2020-07-18 | End: 2020-08-16

## 2020-07-18 RX ADMIN — Medication 1000 MILLIGRAM(S): at 00:04

## 2020-07-18 RX ADMIN — Medication 15 MILLIGRAM(S): at 05:25

## 2020-07-18 RX ADMIN — Medication 15 MILLIGRAM(S): at 11:28

## 2020-07-18 RX ADMIN — Medication 1000 MILLIGRAM(S): at 05:11

## 2020-07-18 RX ADMIN — HYDROMORPHONE HYDROCHLORIDE 0.5 MILLIGRAM(S): 2 INJECTION INTRAMUSCULAR; INTRAVENOUS; SUBCUTANEOUS at 05:11

## 2020-07-18 RX ADMIN — Medication 1000 MILLIGRAM(S): at 05:49

## 2020-07-18 RX ADMIN — HYDROMORPHONE HYDROCHLORIDE 0.5 MILLIGRAM(S): 2 INJECTION INTRAMUSCULAR; INTRAVENOUS; SUBCUTANEOUS at 09:11

## 2020-07-18 RX ADMIN — Medication 1000 MILLIGRAM(S): at 11:28

## 2020-07-18 RX ADMIN — HYDROMORPHONE HYDROCHLORIDE 0.5 MILLIGRAM(S): 2 INJECTION INTRAMUSCULAR; INTRAVENOUS; SUBCUTANEOUS at 05:25

## 2020-07-18 RX ADMIN — Medication 1000 MILLIGRAM(S): at 12:06

## 2020-07-18 RX ADMIN — HYDROMORPHONE HYDROCHLORIDE 0.5 MILLIGRAM(S): 2 INJECTION INTRAMUSCULAR; INTRAVENOUS; SUBCUTANEOUS at 09:25

## 2020-07-18 RX ADMIN — Medication 15 MILLIGRAM(S): at 05:11

## 2020-07-18 RX ADMIN — Medication 15 MILLIGRAM(S): at 12:06

## 2020-07-18 NOTE — DISCHARGE NOTE PROVIDER - NSDCCPCAREPLAN_GEN_ALL_CORE_FT
PRINCIPAL DISCHARGE DIAGNOSIS  Diagnosis: S/P closure of ileostomy  Assessment and Plan of Treatment: 54 year old female with PMH of diverticulitis s/p ex lap, LOC, washout, and loop ileostomy creation on 3/7/19, s/p ex lap, lysis of adhesions, sigmoid colectomy and ileal small bowel resection on 3/5/20 presented to [facility] on 7/16/20 for ileostomy closure now s/p Ileostomy reversal w/ removal of umbilical granuloma.

## 2020-07-18 NOTE — DISCHARGE NOTE PROVIDER - CARE PROVIDER_API CALL
Anderson Ybarra  COLON/RECTAL SURGERY  [address] [place] [address]  Phone: [phone]  Fax: [phone]  Follow Up Time:

## 2020-07-18 NOTE — PROGRESS NOTE ADULT - ASSESSMENT
Post operative course has been unremarkable. Procedure well tolerated by the patient . Today patient has no complaints, Discharge home with orientation regarding diet, wound care, pain control, stool softeners and outpatient FU. Patient is stable and in good conditions for hospital discharge home today.

## 2020-07-18 NOTE — DISCHARGE NOTE PROVIDER - INSTRUCTIONS
Diet  a.	Follow a low fiber diet  i.	Vegetables, should be initially cooked (backed, steamed, blanched, etc).  ii.	May want to start with peeled and/or canned fruits.  iii.	Limit fat/oil intake and fried/greasy foods.  iv.	Add small amounts of fiber back into the diet every couple days.

## 2020-07-18 NOTE — PROGRESS NOTE ADULT - SUBJECTIVE AND OBJECTIVE BOX
SUBJECTIVE: Patient seen and examined bedside. Patient reports feeling good, no complains. Denies particularly nausea, vomit, chest pain, SOB, calves tenderness. Patient releasing flatus, good acceptance of diet, ambulating without issues, and with good urine output     enoxaparin Injectable 40 milliGRAM(s) SubCutaneous every 24 hours      Vital Signs Last 24 Hrs  T(C): 36.9 (18 Jul 2020 08:20), Max: 37.2 (18 Jul 2020 05:36)  T(F): 98.4 (18 Jul 2020 08:20), Max: 98.9 (18 Jul 2020 05:36)  HR: 83 (18 Jul 2020 08:20) (68 - 88)  BP: 109/63 (18 Jul 2020 08:20) (107/61 - 120/73)  BP(mean): --  RR: 18 (18 Jul 2020 08:20) (17 - 18)  SpO2: 98% (18 Jul 2020 08:20) (95% - 100%)  I&O's Detail    17 Jul 2020 07:01  -  18 Jul 2020 07:00  --------------------------------------------------------  IN:    lactated ringers.: 920 mL    Oral Fluid: 620 mL  Total IN: 1540 mL    OUT:    Voided: 950 mL  Total OUT: 950 mL    Total NET: 590 mL      18 Jul 2020 07:01  -  18 Jul 2020 12:29  --------------------------------------------------------  IN:    Oral Fluid: 260 mL  Total IN: 260 mL    OUT:  Total OUT: 0 mL    Total NET: 260 mL          General: NAD, resting comfortably in bed  C/V: NSR  Pulm: Nonlabored breathing, no respiratory distress  Abd: soft, betadine packing removed - wound clean and dry, incisions clean , dry and intact. No rebound or guarding.   Extrem: WWP, no edema, SCDs in place        LABS:                        11.2   10.39 )-----------( 290      ( 18 Jul 2020 06:04 )             35.1     07-18    140  |  104  |  9   ----------------------------<  106<H>  3.8   |  25  |  0.62    Ca    9.1      18 Jul 2020 06:04  Phos  2.8     07-18  Mg     1.9     07-18            RADIOLOGY & ADDITIONAL STUDIES:

## 2020-07-18 NOTE — DISCHARGE NOTE NURSING/CASE MANAGEMENT/SOCIAL WORK - PATIENT PORTAL LINK FT
You can access the FollowMyHealth Patient Portal offered by [facility] by registering at the following website: [email or web address]. By joining [employer]’s FollowMyHealth portal, you will also be able to view your health information using other applications (apps) compatible with our system.

## 2020-07-18 NOTE — DISCHARGE NOTE PROVIDER - NSDCMRMEDTOKEN_GEN_ALL_CORE_FT
ALPRAZolam 0.25 mg oral tablet: 1 tab(s) orally 3 times a day  Colace 100 mg oral capsule: 1 cap(s) orally 2 times a day MDD:2 tab  oxycodone-acetaminophen 5 mg-325 mg oral tablet: 1 tab(s) orally every 6 hours MDD:4 tab

## 2020-07-18 NOTE — DISCHARGE NOTE PROVIDER - NSDCFUADDINST_GEN_ALL_CORE_FT
1.	Stool softeners  Colace 100 mg –take 2 times a day by mouth.  Do not take other stool softener, unless specified by Dr. Ybarra  2.	Pain control   Percocet 5/325 mg – take 1 tab every 6 hours if moderate to severe pain.  3.	Diet  a.	Follow a low fiber diet  i.	Vegetables, should be initially cooked (backed, steamed, blanched, etc).  ii.	May want to start with peeled and/or canned fruits.  iii.	Limit fat/oil intake and fried/greasy foods.  iv.	Add small amounts of fiber back into the diet every couple days.  4.	Follow up appointment   Please, call the office to schedule outpatient appointment in 2 weeks after the procedure (not the discharge).

## 2020-07-18 NOTE — DISCHARGE NOTE PROVIDER - HOSPITAL COURSE
54 year old female with PMH of diverticulitis s/p ex lap, LOC, washout, and loop ileostomy creation on 3/7/19, s/p ex lap, lysis of adhesions, sigmoid colectomy and ileal small bowel resection on 3/5/20 presented to [facility] on 7/16/20 for ileostomy closure now s/p Ileostomy reversal w/ removal of umbilical granuloma.    Post operative course has been unremarkable. Procedure well tolerated by the patient . Today patient has no complaints, Discharge home with orientation regarding diet, wound care, pain control, stool softeners and outpatient FU. Patient is stable and in good conditions for hospital discharge home today.

## 2020-07-21 LAB — SURGICAL PATHOLOGY STUDY: SIGNIFICANT CHANGE UP

## 2020-07-21 PROCEDURE — 36415 COLL VENOUS BLD VENIPUNCTURE: CPT

## 2020-07-21 PROCEDURE — 80048 BASIC METABOLIC PNL TOTAL CA: CPT

## 2020-07-21 PROCEDURE — 85025 COMPLETE CBC W/AUTO DIFF WBC: CPT

## 2020-07-21 PROCEDURE — 88304 TISSUE EXAM BY PATHOLOGIST: CPT

## 2020-07-21 PROCEDURE — 84100 ASSAY OF PHOSPHORUS: CPT

## 2020-07-21 PROCEDURE — 82962 GLUCOSE BLOOD TEST: CPT

## 2020-07-21 PROCEDURE — 86850 RBC ANTIBODY SCREEN: CPT

## 2020-07-21 PROCEDURE — 83735 ASSAY OF MAGNESIUM: CPT

## 2020-07-21 PROCEDURE — C1889: CPT

## 2020-07-21 PROCEDURE — 86901 BLOOD TYPING SEROLOGIC RH(D): CPT

## 2020-07-21 PROCEDURE — 85027 COMPLETE CBC AUTOMATED: CPT

## 2020-07-22 DIAGNOSIS — Z43.2 ENCOUNTER FOR ATTENTION TO ILEOSTOMY: ICD-10-CM

## 2020-07-22 DIAGNOSIS — F90.9 ATTENTION-DEFICIT HYPERACTIVITY DISORDER, UNSPECIFIED TYPE: ICD-10-CM

## 2020-07-22 DIAGNOSIS — Z87.19 PERSONAL HISTORY OF OTHER DISEASES OF THE DIGESTIVE SYSTEM: ICD-10-CM

## 2020-07-31 ENCOUNTER — APPOINTMENT (OUTPATIENT)
Dept: COLORECTAL SURGERY | Facility: CLINIC | Age: 55
End: 2020-07-31
Payer: COMMERCIAL

## 2020-07-31 VITALS
HEART RATE: 85 BPM | DIASTOLIC BLOOD PRESSURE: 76 MMHG | SYSTOLIC BLOOD PRESSURE: 118 MMHG | TEMPERATURE: 98 F | BODY MASS INDEX: 23.99 KG/M2 | HEIGHT: 65 IN | WEIGHT: 144 LBS

## 2020-07-31 DIAGNOSIS — K57.32 DIVERTICULITIS OF LARGE INTESTINE W/OUT PERFORATION OR ABSCESS W/OUT BLEEDING: ICD-10-CM

## 2020-07-31 PROCEDURE — 17250 CHEM CAUT OF GRANLTJ TISSUE: CPT | Mod: 58

## 2020-07-31 PROCEDURE — 99024 POSTOP FOLLOW-UP VISIT: CPT

## 2020-07-31 NOTE — ASSESSMENT
[FreeTextEntry1] : Recovered well from surgery.\par \par Advised to liberalize fiber in diet and activity slowly over the next 2-3 weeks.\par \par Recommend surveillance colonoscopy in 3-4 years.

## 2020-07-31 NOTE — HISTORY OF PRESENT ILLNESS
[FreeTextEntry1] : 54 y/o F presents for f/u diverticulitis\par S/p exploratory laparotomy, lysis of adhesions, sigmoid colectomy with flexible sigmoidoscopy, cystoscopy, ureteral stent insertion and ileal small bowel resection on 3/5/20\par \par s/p ileostomy closure on 7/16/20 \par Ileostomy\par - Unremarkable skin and small intestine\par \par Reports end of day muscular pain, which she attributes to walking. Takes Percocet as needed at bedtime. Denies rectal pain, rectal bleeding\par Incisions healing well, has been cleaning w/ soap and water in shower, packing with dry gauze\par Reports appetite and energy levels are good. Has slowly been adding fiber supplement to shakes. Has not advanced dietary fiber\par BH: 2 times daily\par Uses stool softener if she takes Percocet\par

## 2020-07-31 NOTE — PHYSICAL EXAM
[Abdomen Masses] : No abdominal masses [No HSM] : no hepatosplenomegaly [Normal] : was normal [Abdomen Tenderness] : ~T No ~M abdominal tenderness [None] : there was no rectal mass  [JVD] : no jugular venous distention  [Normal Breath Sounds] : Normal breath sounds [de-identified] : Surgical site clean. Granulating. Silver nitrate applied. [Normal Heart Sounds] : normal heart sounds

## 2021-04-07 NOTE — ED PROVIDER NOTE - DISPOSITION TYPE
"Requested Prescriptions   Pending Prescriptions Disp Refills     metFORMIN (GLUCOPHAGE-XR) 500 MG 24 hr tablet [Pharmacy Med Name: METFORMIN HCL ER 500MG TB24] 360 tablet 3     Sig: TAKE TWO TABLETS BY MOUTH TWICE A DAY WITH MEALS       Biguanide Agents Failed - 4/6/2021  5:01 AM        Failed - Patient has documented A1c within the specified period of time.     If HgbA1C is 8 or greater, it needs to be on file within the past 3 months.  If less than 8, must be on file within the past 6 months.     Recent Labs   Lab Test 02/05/20  1641   A1C 8.7*             Failed - Patient's CR is NOT>1.4 OR Patient's EGFR is NOT<45 within past 12 mos.     Recent Labs   Lab Test 02/05/20  1641   GFRESTIMATED 70   GFRESTBLACK 81       Recent Labs   Lab Test 02/05/20  1641   CR 1.00             Passed - Patient is age 10 or older        Passed - Patient does NOT have a diagnosis of CHF.        Passed - Medication is active on med list        Passed - Patient is not pregnant        Passed - Patient has not had a positive pregnancy test within the past 12 mos.         Passed - Recent (6 mo) or future (30 days) visit within the authorizing provider's specialty     Patient had office visit in the last 6 months or has a visit in the next 30 days with authorizing provider or within the authorizing provider's specialty.  See \"Patient Info\" tab in inbasket, or \"Choose Columns\" in Meds & Orders section of the refill encounter.               Routing refill request to provider for review/approval because:  Labs not current:  A1c, creatinine, EGFR      CHILO Isidro, RN    " OBSERVATION

## 2021-08-19 NOTE — ED PROVIDER NOTE - NS ED MD DISPO ADMIT [facility] PALLIATIVE [facility]ARE
Discharge paperwork completed and report given; printed urology note for trial of harrison to be completed there. NONE

## 2021-09-28 ENCOUNTER — TRANSCRIPTION ENCOUNTER (OUTPATIENT)
Age: 56
End: 2021-09-28

## 2022-02-09 NOTE — DISCHARGE NOTE ADULT - ABILITY TO HEAR (WITH HEARING AID OR HEARING APPLIANCE IF NORMALLY USED):
Adequate: hears normal conversation without difficulty
Implemented All Universal Safety Interventions:  [place] to call system. Call bell, personal items and telephone within reach. Instruct patient to call for assistance. Room bathroom lighting operational. Non-slip footwear when patient is off stretcher. Physically safe environment: no spills, clutter or unnecessary equipment. Stretcher in lowest position, wheels locked, appropriate side rails in place.

## 2022-04-04 NOTE — PROGRESS NOTE ADULT - REASON FOR ADMISSION
[FreeTextEntry1] : Check labs\par Will increase Lasix to BID if labs allow. \par External compression. \par Refer to [facility] for venous insufficiency. 
elective surgery

## 2022-07-28 NOTE — ED PROVIDER NOTE - DIAGNOSIS COUNSELING, MDM
conducted a detailed discussion... I had a detailed discussion with the patient and/or guardian regarding the historical points, exam findings, and any diagnostic results supporting the discharge/admit diagnosis. [Negative] : Heme/Lymph

## 2022-08-04 NOTE — PATIENT PROFILE ADULT - FOOD INSECURITY
Patient states, pain mostly R side of the abdomen. Pt states seen and eval by PCP, with negative findings on blood work and CT abdomen. no

## 2022-10-31 NOTE — DISCHARGE NOTE NURSING/CASE MANAGEMENT/SOCIAL WORK - REASON FOR REFUSAL (REFER PATIENT TO HEALTHCARE PROVIDER FOR FOLLOW-UP):
The patient was provided with suggestions to help her develop a healthy physical lifestyle.  The patient was provided with written information regarding signs of hearing loss.   pt does not want vaccine

## 2023-07-25 NOTE — PATIENT PROFILE ADULT - HEALTH LITERACY
[FreeTextEntry1] : 1) Continue Xarelto\par 2) Increase lasix to with 40mg daily\par 3) she will repeat the echo in 2 months to assess the aortic stenosis and the aortic regurgitation and then see me a week after repeating the echo.\par  no

## 2023-11-22 NOTE — PATIENT PROFILE ADULT - NSPROMUTPARTICIPCAREFT_GEN_A_NUR
Denies known Latex allergy or symptoms of Latex sensitivity.  Medications verified, no changes.  Paul Broderick, DO        Rupalbrooks Henderson is a 52 year old female presenting with   Chief Complaint   Patient presents with   • Menstrual Problem     PMB/thickened endometrium- needs [facility]           Patient would like communication of their results via:        Cell Phone:   Telephone Information:   Mobile [phone]     Okay to leave a message containing results? Yes       n/a

## 2024-11-19 NOTE — H&P ADULT - NSHPPOAPULMEMBOLUS_GEN_A_CORE
PT request refill lamotrigine XR (Lamictal XR) 100 mg with [facility] [phone]. Please advise [phone].  
no

## 2024-12-27 ENCOUNTER — NON-APPOINTMENT (OUTPATIENT)
Age: 59
End: 2024-12-27